# Patient Record
Sex: FEMALE | Race: WHITE | Employment: OTHER | URBAN - METROPOLITAN AREA
[De-identification: names, ages, dates, MRNs, and addresses within clinical notes are randomized per-mention and may not be internally consistent; named-entity substitution may affect disease eponyms.]

---

## 2018-06-27 ENCOUNTER — OFFICE VISIT (OUTPATIENT)
Dept: ORTHOPEDIC SURGERY | Age: 63
End: 2018-06-27
Payer: COMMERCIAL

## 2018-06-27 VITALS
SYSTOLIC BLOOD PRESSURE: 112 MMHG | HEART RATE: 74 BPM | DIASTOLIC BLOOD PRESSURE: 71 MMHG | BODY MASS INDEX: 27.31 KG/M2 | TEMPERATURE: 97.3 F | WEIGHT: 160 LBS | HEIGHT: 64 IN

## 2018-06-27 DIAGNOSIS — M25.552 HIP PAIN, BILATERAL: Primary | ICD-10-CM

## 2018-06-27 DIAGNOSIS — M25.551 HIP PAIN, BILATERAL: Primary | ICD-10-CM

## 2018-06-27 PROCEDURE — 99203 OFFICE O/P NEW LOW 30 MIN: CPT | Performed by: ORTHOPAEDIC SURGERY

## 2018-06-27 RX ORDER — VENLAFAXINE HYDROCHLORIDE 37.5 MG/1
37.5 CAPSULE, EXTENDED RELEASE ORAL DAILY
COMMUNITY
Start: 2018-06-05 | End: 2020-11-18

## 2020-09-30 VITALS — BODY MASS INDEX: 27.46 KG/M2 | HEIGHT: 64 IN | DIASTOLIC BLOOD PRESSURE: 72 MMHG | SYSTOLIC BLOOD PRESSURE: 124 MMHG

## 2020-11-18 ENCOUNTER — OFFICE VISIT (OUTPATIENT)
Dept: ORTHOPEDIC SURGERY | Age: 65
End: 2020-11-18
Payer: COMMERCIAL

## 2020-11-18 VITALS — HEIGHT: 64 IN | TEMPERATURE: 98.2 F | BODY MASS INDEX: 28.17 KG/M2 | WEIGHT: 165 LBS

## 2020-11-18 PROCEDURE — 99214 OFFICE O/P EST MOD 30 MIN: CPT | Performed by: ORTHOPAEDIC SURGERY

## 2020-11-18 RX ORDER — VENLAFAXINE 37.5 MG/1
37.5 TABLET ORAL DAILY
COMMUNITY

## 2020-11-18 RX ORDER — DICLOFENAC SODIUM 75 MG/1
75 TABLET, DELAYED RELEASE ORAL 2 TIMES DAILY
Qty: 60 TABLET | Refills: 3 | Status: SHIPPED
Start: 2020-11-18 | End: 2021-05-05

## 2020-11-18 NOTE — LETTER
Joceline Raza M.D. / Yaakov Spine. Deirdre Estrada M.D. Orthopaedic Surgeons - Board Certified  [address] [facility]  [address],  Gabrielle Rodriguez, [address] [place] [address]  Phone:  [phone]    Fax:   [phone]    Sandra Lorrie  Type of Procedure: RIGHT TOTAL HIP REPLACEMENT  Place of Surgery: [address] [place] [address]  Date of Surgery: TBA  Time of Surgery: TBA      PREOPERATIVE INSTRUCTIONS FOR TOTAL JOINT REPLACEMENT  1. Read all the information provided for you in this packet and joint replacement folder; retain it for 2 years following surgery. 2.  Follow Dietary Handout: Patient Education Guide: Iron Replacement   Begin eating foods rich in iron one month before your surgery and continue for one month after surgery. 3.  Optional:  Not required by Dr. Deirdre Estrada. You may choose an over the counter iron supplement and start taking it at least one month prior to surgery and continue taking the iron supplement for one month following surgery. Feel free to contact your primary care physician for his / her approval or for a prescription. 4.  A nurse from [facility] will contact you and inform you of a date and time for your Pre-Admission Testing Day. You may contact them at [phone] [facility]) or [phone] [facility] for any special requests. 5. Inform your family doctor as soon as your surgery date has been scheduled. See your doctor before surgery and obtain a letter of medical clearance for our office. Notify any specialists you are seeing and obtain a letter of clearance. You must see your pulmonary specialist prior to surgery if you have a condition called Sleep Apnea or use a CPAP machine at night. Please inform us if you have had problems with anesthesia in the past; especially with intubation. 6.  If you do not see a dentist regularly, see your dentist prior to surgery for a dental checkup and cleaning. Please have all dental procedures completed prior to surgery. Your teeth and gums must be in good condition for surgery. Have cavities filled, broken teeth repaired and root canals and / or teeth extractions completed prior to surgery. If teeth extractions are needed prior to surgery, a note from your dentist is required stating your mouth is healed and free of infection. 7. Inform our office if you are ill, have an infection, or if any doctor has prescribed an antibiotic for you. You must be free of infection for surgery. If you become ill within 2 weeks of your surgery date (cold, congestion, flu), surgery will be postponed. A letter of medical clearance from your family doctor will then be required to re-schedule your surgery on the next available date. 8.  If you have private insurance, call customer service and check your benefits and any deductibles / co-pays. 9.  The goal is to discharge you home to your family. If you feel inpatient rehab will be needed, contact your insurance carrier prior to surgery for a list of inpatient rehab facilities. Visiting one or two facilities before surgery may help with your decision. 10.  After surgery, continue regular checkups with your dentist. Please inform your dentist of your joint replacement surgery upon scheduling appointments. Your dentist is to follow The American Dental Association Guidelines for Premedication / Antibiotics prior to dental procedures. 11.  All requests for pain medication refills from our office must be called in to [phone], Ext [phone]. Please speak clearly when leaving your message with the following information:  Your name, spelling of last name, date of birth, name of medication, your pharmacy name and phone number. Please do not wait until the last minute to call in your prescription requests. There is a 24 - 72 hour turn-around time for all refills. If you call on Friday, your request will not begin processing until the next business day. You will be informed when your prescription has been printed and ready for pickup at the doctor's office.     Any questions, problems or concerns regarding your surgery should be addressed to our nurse by calling the office Monday through Thursday 10:00 am - 3:00 pm and Friday 10:00 am - 2:00 pm.

## 2020-11-19 NOTE — PROGRESS NOTES
progression from x-rays in 2018. Left hip is less involved. There is noted to be slight decrease of the acetabular index suggestive of mild hip dysplasia but adequate bone present for reconstruction. Impression: Severe osteoarthritic changes right hip. ASSESSMENT/PLAN:    Ed Burgos was seen today for hip pain. Diagnoses and all orders for this visit:    Hip pain, bilateral  -     XR HIP BILATERAL W AP PELVIS (2 VIEWS); Future    Other orders  -     diclofenac (VOLTAREN) 75 MG EC tablet; Take 1 tablet by mouth 2 times daily    Findings and images reviewed with the patient. She has significant progressive osteoarthritic findings in the right hip on exam and x-ray. She will likely need to consider total hip arthroplasty at some point. In the meantime I reviewed appropriate exercise parameters to discuss with her . Also trial of Voltaren 75 mg twice daily with meals GI precautions for pain. Educational materials regarding total hip arthroplasty were given to the patient today. Return in about 1 month (around 12/18/2020).        Glo Hazel MD    11/19/2020  8:02 AM

## 2021-02-03 ENCOUNTER — TELEPHONE (OUTPATIENT)
Dept: ORTHOPEDIC SURGERY | Age: 66
End: 2021-02-03

## 2021-02-08 ENCOUNTER — TELEPHONE (OUTPATIENT)
Dept: ORTHOPEDIC SURGERY | Age: 66
End: 2021-02-08

## 2021-02-08 ENCOUNTER — OFFICE VISIT (OUTPATIENT)
Dept: ORTHOPEDIC SURGERY | Age: 66
End: 2021-02-08
Payer: COMMERCIAL

## 2021-02-08 VITALS — HEIGHT: 64 IN | TEMPERATURE: 96.6 F | WEIGHT: 165 LBS | BODY MASS INDEX: 28.17 KG/M2

## 2021-02-08 DIAGNOSIS — M25.551 HIP PAIN, CHRONIC, RIGHT: Primary | ICD-10-CM

## 2021-02-08 DIAGNOSIS — G89.29 HIP PAIN, CHRONIC, RIGHT: Primary | ICD-10-CM

## 2021-02-08 PROCEDURE — 99214 OFFICE O/P EST MOD 30 MIN: CPT | Performed by: ORTHOPAEDIC SURGERY

## 2021-02-08 RX ORDER — ACETAMINOPHEN 500 MG
1000 TABLET ORAL EVERY 6 HOURS PRN
COMMUNITY
End: 2021-02-25

## 2021-02-08 RX ORDER — ZINC OXIDE 13 %
1 CREAM (GRAM) TOPICAL DAILY
COMMUNITY
End: 2021-02-25

## 2021-02-08 NOTE — LETTER
If you have any questions or concerns, please contact our nurse at [phone], Option 2:  Monday through Thursday 9:00 am - 4:00 pm  Friday 10:00 am - 12 noon no

## 2021-02-08 NOTE — LETTER
Gil Apgar, M.D. / Amina Oro. Marrianne Bosworth, M.D. Orthopaedic Surgeons - Board Certified  [address] [facility]  [address], [address] [facility] [address]  [address]  Phone:  [phone]    Fax:   [phone]    Linden Carr  Type of Procedure: RIGHT HIP REPLACEMENT  Place of Surgery: [place] [address]. Date of Surgery: 2021  Time of Surgery: 8:00 am      PREOPERATIVE INSTRUCTIONS FOR TOTAL JOINT REPLACEMENT  1. Read all the information provided for you in this packet and joint replacement folder; retain it for 2 years following surgery. 2.  Follow Dietary Handout: Patient Education Guide: Iron Replacement   Begin eating foods rich in iron one month before your surgery and continue for one month after surgery. 3.  Optional:  Not required by Dr. Marrianne Bosworth. You may choose an over the counter iron supplement and start taking it at least one month prior to surgery and continue taking the iron supplement for one month following surgery. Feel free to contact your primary care physician for his / her approval or for a prescription. 4.  A nurse from [facility] will contact you and inform you of a date and time for your Pre-Admission Testing Day. You may contact them at [phone] [facility]) or [phone] [facility] for any special requests. 5. Inform your family doctor as soon as your surgery date has been scheduled. See your doctor before surgery and obtain a letter of medical clearance for our office. Notify any specialists you are seeing and obtain a letter of clearance. You must see your pulmonary specialist prior to surgery if you have a condition called Sleep Apnea or use a CPAP machine at night. Please inform us if you have had problems with anesthesia in the past; especially with intubation. 6.  If you do not see a dentist regularly, see your dentist prior to surgery for a dental checkup and cleaning. Please have all dental procedures completed prior to surgery. Your teeth and gums must be in good condition for surgery. Have cavities filled, broken teeth repaired and root canals and / or teeth extractions completed prior to surgery. If teeth extractions are needed prior to surgery, a note from your dentist is required stating your mouth is healed and free of infection. 7. Inform our office if you are ill, have an infection, or if any doctor has prescribed an antibiotic for you. You must be free of infection for surgery. If you become ill within 2 weeks of your surgery date (cold, congestion, flu), surgery will be postponed. A letter of medical clearance from your family doctor will then be required to re-schedule your surgery on the next available date. 8.  If you have private insurance, call customer service and check your benefits and any deductibles / co-pays. 9.  The goal is to discharge you home to your family. If you feel inpatient rehab will be needed, contact your insurance carrier prior to surgery for a list of inpatient rehab facilities. Visiting one or two facilities before surgery may help with your decision. 10.  After surgery, continue regular checkups with your dentist. Please inform your dentist of your joint replacement surgery upon scheduling appointments. Your dentist is to follow The American Dental Association Guidelines for Premedication / Antibiotics prior to dental procedures. 11.  All requests for pain medication refills from our office must be called in to [phone], Ext [phone]. Please speak clearly when leaving your message with the following information:  Your name, spelling of last name, date of birth, name of medication, your pharmacy name and phone number. Please do not wait until the last minute to call in your prescription requests. There is a 24 - 72 hour turn-around time for all refills. If you call on Friday, your request will not begin processing until the next business day. You will be informed when your prescription has been printed and ready for pickup at the doctor's office.     Any questions, problems or concerns regarding your surgery should be addressed to our nurse by calling the office Monday through Thursday 10:00 am - 3:00 pm and Friday 10:00 am - 2:00 pm.    Molly Edwards RN, BSN, ONC, scribe for Dr. Rayshawn Hammond MD - Orthopedic Surgeon

## 2021-02-08 NOTE — LETTER
Jyoti Becerra M.D.   [address] Reese Kang M.D. [address]     March 16, 2021  Surgery Date:                                                                  Regarding Prescription & Non-Prescription Medications:    Medication Name to Discontinue:   Days before surgery  Last dose  Diclofenac or Voltaren     7   3/08/2021                                                                                                                                                                                                                                                                                                                                                                                                                                                                                                                                                                                                                                                                                                                                                                                                                                                                                                                                                                                                                                                                                                                                                                                                                                                                                                                                             ** All medications discontinued prior to surgery may be resumed after your surgery is completed, unless otherwise specified by the physician. If you have any questions or concerns, please contact our nurse at [phone], Option 2:  Monday through Thursday 9:00 am - 4:00 pm  Friday 10:00 am - 12 noon

## 2021-02-08 NOTE — LETTER
Marilyn Pulido M.D. / Megan Salazar. Eder Schwarz M.D. Orthopaedic Surgeons - Board Certified  [address] [facility]  [address], [address] [facility] [address]  [facility], [address] [place] [address]  Phone:  [phone]    Fax:   [phone]    Kennedi Joyce  Type of Procedure: RIGHT HIP REPLACEMENT  Place of Surgery: [address] [place] [address]. Date of Surgery: Tuesday, March 9, 2021  Time of Surgery: 8:00 am      PREOPERATIVE INSTRUCTIONS FOR TOTAL JOINT REPLACEMENT  1. Read all the information provided for you in this packet and joint replacement folder; retain it for 2 years following surgery. 2.  Follow Dietary Handout: Patient Education Guide: Iron Replacement   Begin eating foods rich in iron one month before your surgery and continue for one month after surgery. 3.  Optional:  Not required by Dr. Eder Schwarz. You may choose an over the counter iron supplement and start taking it at least one month prior to surgery and continue taking the iron supplement for one month following surgery. Feel free to contact your primary care physician for his / her approval or for a prescription. 4.  A nurse from [facility] will contact and inform you of a date and location for your Covid test and date for your Pre-Admission Testing Day and Mandatory Joint Class. You may contact them at [phone] [facility]) or [phone] [facility]) for any special requests. 5. Inform your family doctor as soon as your surgery date has been scheduled. See your doctor before surgery and obtain a letter of medical clearance for our office. Notify any specialists you are seeing and obtain a letter of clearance. You must see your pulmonary specialist prior to surgery if you have a condition called Sleep Apnea or use a CPAP machine at night. Please inform us if you have had problems with anesthesia in the past; especially with intubation. 6.  If you do not see a dentist regularly, see your dentist prior to surgery for a dental checkup and cleaning. Please have all dental procedures completed prior to surgery. Your teeth and gums must be in good condition for surgery. Have cavities filled, broken teeth repaired and root canals and / or teeth extractions completed prior to surgery. If teeth extractions are needed prior to surgery, a note from your dentist is required stating your mouth is healed and free of infection. 7. Inform our office if you are ill, have an infection, or if any doctor has prescribed an antibiotic for you. You must be free of infection for surgery. If you become ill within 2 weeks of your surgery date (cold, congestion, flu), surgery will be postponed. A letter of medical clearance from your family doctor will then be required to re-schedule your surgery on the next available date. 8.  If you have private insurance, call customer service and check your benefits and any deductibles / co-pays. 9.  The goal is to discharge you home to your family. If you feel inpatient rehab will be needed, contact your insurance carrier prior to surgery for a list of inpatient rehab facilities. Visiting one or two facilities before surgery may help with your decision. 10.  After surgery, continue regular checkups with your dentist. Please inform your dentist of your joint replacement surgery upon scheduling appointments. Your dentist is to follow The American Dental Association Guidelines for Premedication / Antibiotics prior to dental procedures. 11.  All requests for pain medication refills from our office must be called in to [phone], Ext [phone]. Please speak clearly when leaving your message with the following information:  Your name, spelling of last name, date of birth, name of medication, your pharmacy name and phone number. Please do not wait until the last minute to call in your prescription requests. There is a 24 - 72 hour turn-around time for all refills. If you call on Friday, your request will not begin processing until the next business day. You will be informed when your prescription has been printed and ready for pickup at the doctor's office.     Any questions, problems or concerns regarding your surgery should be addressed to our nurse by calling the office Monday through Thursday 10:00 am - 3:00 pm and Friday 10:00 am - 2:00 pm.    Chapin Ewing RN, BSN, ONC, scribe for Dr. Jose C Rosales MD - Orthopedic Surgeon

## 2021-02-08 NOTE — PROGRESS NOTES
RIGHT HIP TOTAL JOINT ARTHROPLASTY, [place], general versus spinal anesthesia, Date: Tuesday, March 9, 2021, Time: 8:00 am, Place: [address], Surgeon: Sunny Nieves. Reviewed medications with patient / holding the following medications for surgery: Voltaren or Diclofenac 7 days pre op. Will inform PCP: Pawel Abad of plans for surgery. Patient has regular checkups with the dentist. Dietary Handout: Patient Education Guide Regarding Iron Replacement given to and reviewed with patient. Patient instructed to begin eating foods rich in Iron and Vitamin C one month pre op and continue for one month post op. Pre op instructions discussed w/ patient and spouse. Total Joint Folder given to the patient on a previous visit. Patient informed: A hospital nurse from Pre Admission Testing will contact her with a date and location for Covid Testing and a date for Pre Admission Testing and Mandatory Joint Class. Patient expresses understanding and is in agreement with the plan. Patient will call office with any questions, concerns or problems.     DME needs: Patient has a [employer]

## 2021-02-08 NOTE — TELEPHONE ENCOUNTER
2/08/2021 Fax message to Dr. José Miguel Dickerson [phone]: Notification of plans for surgery - [facility] 3/16/2021. Request medical clearance for surgery. Holding the following medications for surgery: Diclofenac or Voltaren 7 days pre op. Instructed patient to call Dr. Ata Dyer office for an appointment.

## 2021-02-09 ENCOUNTER — TELEPHONE (OUTPATIENT)
Dept: ORTHOPEDIC SURGERY | Age: 66
End: 2021-02-09

## 2021-02-09 NOTE — TELEPHONE ENCOUNTER
Patient has MMO insurance. [employer] ([id number]) is on OPT/OBS list.  Patient scheduled as OPT for [employer] 3/9/2021 NENA BURGOS.  to work on extra days if needed.

## 2021-02-23 NOTE — PROGRESS NOTES
PATIENT AGREES TO COVID TESTING TO BE DONE 3/4/21 @ [facility], AGREES TO SELF ISOLATE UNTIL SURGERY DATE.

## 2021-02-25 NOTE — PROGRESS NOTES
[facility] [facility] PRE-ADMISSION TESTING GENERAL INSTRUCTIONS- [facility]-phone number:[phone]    GENERAL INSTRUCTIONS  [x] Antibacterial Soap shower Night before and/or AM of Surgery  [x] Yasir wipe instruction sheet and wipes given. [x] Nothing by mouth after midnight, including gum, candy, mints, or water. [x] You may brush your teeth, gargle, but do NOT swallow water. [x]No smoking, chewing tobacco, illegal drugs, or alcohol within 24 hours of your surgery. [x] Jewelry, valuables or body piercing's should not be brought to the hospital. All body and/or tongue piercing's must be removed prior to arriving to hospital.  ALL hair pins must be removed. [x] Do not wear makeup, lotions, powders, deodorant. Nail polish as directed by the nurse. [x] Bring insurance card and photo ID. [x] Transfusion Bracelet: Please bring with you to hospital, day of surgery       PARKING INSTRUCTIONS:   [x] Arrival Time:_0600________  [x] Arrive to the [facility] entrance for surgery 3/9/21. You will be directed to pre op. [x] To reach the [facility] from [address] [place] [address], upon entering the hospital, take elevator B to the 3rd floor. EDUCATION INSTRUCTIONS:      [x] Knee or hip replacement booklet & exercise pamphlets given. [x] Pre-admission Testing educational folder given  [x] Incentive Spirometry,coughing & deep breathing exercises reviewed. [x]Fluoroscopy-Xray used in surgery reviewed with patient. Educational pamphlet placed in chart. [x]Pain: Post-op pain is normal and to be expected. You will be asked to rate your pain from 0-10(a zero is not acceptable-education is needed). Your post-op pain goal is:  [x] Ask your nurse for your pain medication. [x] Joint camp offered. [x] Joint replacement booklets given.   [] Other:___________________________    MEDICATION INSTRUCTIONS:   [x]Bring a complete list of your medications, please write the last time you took the medicine, give this list to the nurse. [x] Take the following medications the morning of surgery with 1-2 ounces of water:  venlafaxine  [x] Stop herbal supplements and vitamins 5 days before your surgery. [x] Follow physician instructions regarding any blood thinners you may be taking. WHAT TO EXPECT:  [x] The day of surgery you will be greeted and checked in by the [employer].  In addition, you will be registered in the [facility] by a Patient Access Representative. Please bring your photo ID and insurance card. A nurse will greet you in accordance to the time you are needed in the pre-op area to prepare you for surgery. Please do not be discouraged if you are not greeted in the order you arrive as there are many variables that are involved in patient preparation. Your patience is greatly appreciated as you wait for your nurse. Please bring in items such as: books, magazines, newspapers, electronics, or any other items  to occupy your time in the waiting area. [x]  Delays may occur with surgery and staff will make a sincere effort to keep you informed of delays. If any delays occur with your procedure, we apologize ahead of time for your inconvenience as we recognize the value of your time.

## 2021-03-01 ENCOUNTER — TELEPHONE (OUTPATIENT)
Dept: ORTHOPEDIC SURGERY | Age: 66
End: 2021-03-01

## 2021-03-01 NOTE — TELEPHONE ENCOUNTER
3/01/2021 10:41 am Received a fax from [facility] [phone]: A note dated 2/22/2021 and signed by Dr. Amita Eason 'Patient IS medically stable for surgery. Includes a copy of Dr. Emerson Hernandez office note dated 2/18/2021. EKG is completely normal. Is Okay for surgery. EKG strip included. Above scanned to media.

## 2021-03-02 ENCOUNTER — TELEPHONE (OUTPATIENT)
Dept: ORTHOPEDIC SURGERY | Age: 66
End: 2021-03-02

## 2021-03-02 ENCOUNTER — HOSPITAL ENCOUNTER (OUTPATIENT)
Dept: GENERAL RADIOLOGY | Age: 66
Discharge: HOME | End: 2021-03-04
Payer: COMMERCIAL

## 2021-03-02 ENCOUNTER — HOSPITAL ENCOUNTER (OUTPATIENT)
Dept: PREADMISSION TESTING | Age: 66
Discharge: HOME | End: 2021-03-02
Payer: COMMERCIAL

## 2021-03-02 VITALS
BODY MASS INDEX: 28.84 KG/M2 | SYSTOLIC BLOOD PRESSURE: 139 MMHG | DIASTOLIC BLOOD PRESSURE: 72 MMHG | OXYGEN SATURATION: 100 % | WEIGHT: 168 LBS | TEMPERATURE: 97.5 F | RESPIRATION RATE: 18 BRPM | HEART RATE: 65 BPM

## 2021-03-02 DIAGNOSIS — Z01.818 PREOP TESTING: ICD-10-CM

## 2021-03-02 DIAGNOSIS — M16.11 PRIMARY OSTEOARTHRITIS OF RIGHT HIP: ICD-10-CM

## 2021-03-02 LAB
ABO/RH: NORMAL
ALBUMIN SERPL-MCNC: 4 G/DL (ref 3.5–5.2)
ALP BLD-CCNC: 89 U/L (ref 35–104)
ALT SERPL-CCNC: 22 U/L (ref 0–32)
ANION GAP SERPL CALCULATED.3IONS-SCNC: 6 MMOL/L (ref 7–16)
ANTIBODY SCREEN: NORMAL
AST SERPL-CCNC: 24 U/L (ref 0–31)
BACTERIA: ABNORMAL /HPF
BASOPHILS ABSOLUTE: 0.02 E9/L (ref 0–0.2)
BASOPHILS RELATIVE PERCENT: 0.6 % (ref 0–2)
BILIRUB SERPL-MCNC: 0.3 MG/DL (ref 0–1.2)
BILIRUBIN URINE: NEGATIVE
BLOOD, URINE: NEGATIVE
BUN BLDV-MCNC: 17 MG/DL (ref 8–23)
CALCIUM SERPL-MCNC: 9.4 MG/DL (ref 8.6–10.2)
CHLORIDE BLD-SCNC: 102 MMOL/L (ref 98–107)
CLARITY: CLEAR
CO2: 28 MMOL/L (ref 22–29)
COLOR: YELLOW
CREAT SERPL-MCNC: 0.8 MG/DL (ref 0.5–1)
EOSINOPHILS ABSOLUTE: 0.01 E9/L (ref 0.05–0.5)
EOSINOPHILS RELATIVE PERCENT: 0.3 % (ref 0–6)
GFR AFRICAN AMERICAN: >60
GFR NON-AFRICAN AMERICAN: >60 ML/MIN/1.73
GLUCOSE BLD-MCNC: 89 MG/DL (ref 74–99)
GLUCOSE URINE: NEGATIVE MG/DL
HCT VFR BLD CALC: 36 % (ref 34–48)
HEMOGLOBIN: 11.3 G/DL (ref 11.5–15.5)
IMMATURE GRANULOCYTES #: 0.01 E9/L
IMMATURE GRANULOCYTES %: 0.3 % (ref 0–5)
KETONES, URINE: NEGATIVE MG/DL
LEUKOCYTE ESTERASE, URINE: ABNORMAL
LYMPHOCYTES ABSOLUTE: 0.77 E9/L (ref 1.5–4)
LYMPHOCYTES RELATIVE PERCENT: 21.6 % (ref 20–42)
MCH RBC QN AUTO: 28.8 PG (ref 26–35)
MCHC RBC AUTO-ENTMCNC: 31.4 % (ref 32–34.5)
MCV RBC AUTO: 91.8 FL (ref 80–99.9)
MONOCYTES ABSOLUTE: 0.37 E9/L (ref 0.1–0.95)
MONOCYTES RELATIVE PERCENT: 10.4 % (ref 2–12)
NEUTROPHILS ABSOLUTE: 2.39 E9/L (ref 1.8–7.3)
NEUTROPHILS RELATIVE PERCENT: 66.8 % (ref 43–80)
NITRITE, URINE: POSITIVE
PDW BLD-RTO: 13.1 FL (ref 11.5–15)
PH UA: 6 (ref 5–9)
PLATELET # BLD: 196 E9/L (ref 130–450)
PMV BLD AUTO: 11.6 FL (ref 7–12)
POTASSIUM REFLEX MAGNESIUM: 4.2 MMOL/L (ref 3.5–5)
PROTEIN UA: NEGATIVE MG/DL
RBC # BLD: 3.92 E12/L (ref 3.5–5.5)
RBC UA: ABNORMAL /HPF (ref 0–2)
SODIUM BLD-SCNC: 136 MMOL/L (ref 132–146)
SPECIFIC GRAVITY UA: 1.01 (ref 1–1.03)
TOTAL PROTEIN: 9.4 G/DL (ref 6.4–8.3)
UROBILINOGEN, URINE: 0.2 E.U./DL
WBC # BLD: 3.6 E9/L (ref 4.5–11.5)
WBC UA: ABNORMAL /HPF (ref 0–5)

## 2021-03-02 PROCEDURE — 86850 RBC ANTIBODY SCREEN: CPT

## 2021-03-02 PROCEDURE — 36415 COLL VENOUS BLD VENIPUNCTURE: CPT

## 2021-03-02 PROCEDURE — 87088 URINE BACTERIA CULTURE: CPT

## 2021-03-02 PROCEDURE — 71046 X-RAY EXAM CHEST 2 VIEWS: CPT

## 2021-03-02 PROCEDURE — 85025 COMPLETE CBC W/AUTO DIFF WBC: CPT

## 2021-03-02 PROCEDURE — 87186 SC STD MICRODIL/AGAR DIL: CPT

## 2021-03-02 PROCEDURE — 81001 URINALYSIS AUTO W/SCOPE: CPT

## 2021-03-02 PROCEDURE — 86900 BLOOD TYPING SEROLOGIC ABO: CPT

## 2021-03-02 PROCEDURE — 80053 COMPREHEN METABOLIC PANEL: CPT

## 2021-03-02 PROCEDURE — 87081 CULTURE SCREEN ONLY: CPT

## 2021-03-02 PROCEDURE — 86901 BLOOD TYPING SEROLOGIC RH(D): CPT

## 2021-03-02 NOTE — PROGRESS NOTES
Left a voicemail with Dr Goldie Matthews nurse Garrett regarding abnormal UA and CBC results in PAT. Micro notified to run urine culture.

## 2021-03-02 NOTE — TELEPHONE ENCOUNTER
3/02/2021 1:00 pm Aleshia Ibarra RN PAT [facility] phoned office to report: Many bacteria in U/A, WBC's 2-5, leucocytes moderate.     3/02/2021 2:53 pm Lab results from [facility] faxed to Dr. Edmar Dunn [phone]:  Unsuccessful - no answer    3/02/2021 3:30 pm Labs successfully faxed to [phone]

## 2021-03-02 NOTE — TELEPHONE ENCOUNTER
3/02/2021 8:30 am Patient phoned office / message left on voice mail: For surgery next Tuesday [facility]), Have an appointment for my first Covid vaccine on Thursday. Patient asks: OK to recieve before surgery or wait until after surgery?    Phone: [phone]

## 2021-03-03 LAB — MRSA CULTURE ONLY: NORMAL

## 2021-03-03 RX ORDER — SULFAMETHOXAZOLE AND TRIMETHOPRIM 800; 160 MG/1; MG/1
1 TABLET ORAL 2 TIMES DAILY
Qty: 20 TABLET | Refills: 0 | Status: SHIPPED | OUTPATIENT
Start: 2021-03-03 | End: 2021-03-13

## 2021-03-04 ENCOUNTER — HOSPITAL ENCOUNTER (OUTPATIENT)
Age: 66
Discharge: HOME | End: 2021-03-06
Payer: COMMERCIAL

## 2021-03-04 LAB
ORGANISM: ABNORMAL
URINE CULTURE, ROUTINE: ABNORMAL

## 2021-03-04 PROCEDURE — U0003 INFECTIOUS AGENT DETECTION BY NUCLEIC ACID (DNA OR RNA); SEVERE ACUTE RESPIRATORY SYNDROME CORONAVIRUS 2 (SARS-COV-2) (CORONAVIRUS DISEASE [COVID-19]), AMPLIFIED PROBE TECHNIQUE, MAKING USE OF HIGH THROUGHPUT TECHNOLOGIES AS DESCRIBED BY CMS-2020-01-R: HCPCS

## 2021-03-04 NOTE — TELEPHONE ENCOUNTER
3/04/2021 9:00 am Patient phoned office / message left on voice mail: Have an appointment 5:30 pm for Covid vaccine [employer]

## 2021-03-04 NOTE — TELEPHONE ENCOUNTER
3/03/2021 11:14 am Phoned and spoke w/ Aracelsi at Dr. An Ritchie office [phone]. Aracelis reports: A doctor covering for Dr. Acosta Sorenson ordered Macrodantin x's 10 days for the patient. 3/03/2021 Informed RJB of above / Per RJB: Disregard prescription for Bactrim, approved prescription for Macrodantin    3/03/2021 Phoned and spoke w/ patient / informed of above.

## 2021-03-05 DIAGNOSIS — U07.1 COVID-19: ICD-10-CM

## 2021-03-05 LAB — SARS-COV-2, PCR: NOT DETECTED

## 2021-03-05 NOTE — H&P
Lata Gandara MD   Physician   Specialty:  Orthopedic Surgery                               Chief Complaint   Patient presents with    Hip Pain       FU Rt hip pain. Discuss surgery and timing.            HPI   68-year-old woman with known severe osteoarthritis right hip. Seen several months ago with right hip and groin pain refractory to conservative measures including medications and physical therapy. She has been deferring consideration of hip replacement due to her 's recent kidney transplant but he is now doing well. She is ready to plan right total hip arthroplasty.         Patient Active Problem List   Diagnosis    Acute febrile illness    [facility]ogren's disease ([address]    Anxiety and depression         Past Medical History        Past Medical History:   Diagnosis Date    Anxiety      Depression      [address]ogren's [address] ([facility] [address]              Past Surgical History   Past Surgical History:   Procedure Laterality Date    CATARACT REMOVAL Left      DILATION AND CURETTAGE OF UTERUS   1984    SHOULDER ARTHROSCOPY Right 04/29/2015     Excision loose bodies, Acromioplasty, Roxana resection distal clavicle  KENDRA Avery MD            Current Facility-Administered Medications          Current Outpatient Medications   Medication Sig Dispense Refill    Cholecalciferol (VITAMIN D3) 25 MCG (1000 UT) CAPS Take 1 capsule by mouth 2 times daily        acetaminophen (TYLENOL) 500 MG tablet Take 1,000 mg by mouth every 6 hours as needed for Pain        Probiotic Product (PROBIOTIC DAILY) CAPS Take 1 capsule by mouth daily        venlafaxine (EFFEXOR) 37.5 MG tablet Take 37.5 mg by mouth daily        diclofenac (VOLTAREN) 75 MG EC tablet Take 1 tablet by mouth 2 times daily 60 tablet 3    NALTREXONE HCL PO Take 3 mg by mouth daily          No current facility-administered medications for this visit.                   Allergies   Allergen Reactions    Bee Venom Swelling       Does not have epi pen    Pcn [Penicillins]           Social History   Social History            Socioeconomic History    Marital status:        Spouse name: None    Number of children: None    Years of education: None    Highest education level: None   Occupational History    None   Social Needs    Financial resource strain: None    Food insecurity       Worry: None       Inability: None    Transportation needs       Medical: None       Non-medical: None   Tobacco Use    Smoking status: Never Smoker    Smokeless tobacco: Never Used   Substance and Sexual Activity    Alcohol use: No    Drug use: No    Sexual activity: Yes       Partners: Male   Lifestyle    Physical activity       Days per week: None       Minutes per session: None    Stress: None   Relationships    Social connections       Talks on phone: None       Gets together: None       Attends Alevism service: None       Active member of club or organization: None       Attends meetings of clubs or organizations: None       Relationship status: None    Intimate partner violence       Fear of current or ex partner: None       Emotionally abused: None       Physically abused: None       Forced sexual activity: None   Other Topics Concern    None   Social History Narrative    None            Family History         Family History   Problem Relation Age of Onset    Liver Disease Mother      Cancer Father           lung liver cancer major smoker    Cancer Brother           pancreatic cancer             Review of Systems   No fever, chills, or other constitutionalsymptoms. No numbness or other neuro symptoms. No chest pain. No dyspnea.     [unfilled]   Slight antalgic gait favoring right hip. Lower extremity exam demonstrates 5 mm shortening right hip versus the left. There is full rotation of left hip without pain.   Right hip and groin pain immediately produced on internal rotation right hip past neutral limited to about 30 degrees internal rotation. Distal neurologic and vascular grossly intact.     Physical Exam    Patient is alert and oriented. Well-developed well-nourished. BMI 28. Pupils equal and reactive. Scleraeanicteric. Neck supple  Lungs clear. Cardiac rate and rhythm regular. Abdomen soft and nontender. Skin warm and dry.         XRAY: Recent hip x-rays reviewed AP pelvis with AP and lateral views right hip. There are bilateral degenerative changes left hip is moderate right more advanced. In particular the lateral view of the right hip shows stage IV medial compartment joint space loss. Impression: Severe osteoarthritic changes right hip.                     ASSESSMENT/PLAN:     Osteoarthritis right hip     We had a lengthy discussion with the patient and her  today regarding total hip arthroplasty recovery time risks and limitations. Timing issues also reviewed. At this point she wants to schedule right total hip arthroplasty for March 16. Extensive preoperative teaching given today.   The procedure, indications, risks, limitations and recovery time were all reviewed with patient, who requests to proceed.           Kelvin Valdez MD

## 2021-03-09 ENCOUNTER — APPOINTMENT (OUTPATIENT)
Dept: GENERAL RADIOLOGY | Age: 66
End: 2021-03-09
Attending: ORTHOPAEDIC SURGERY
Payer: COMMERCIAL

## 2021-03-09 ENCOUNTER — ANESTHESIA EVENT (OUTPATIENT)
Dept: OPERATING ROOM | Age: 66
End: 2021-03-09
Payer: COMMERCIAL

## 2021-03-09 ENCOUNTER — ANESTHESIA (OUTPATIENT)
Dept: OPERATING ROOM | Age: 66
End: 2021-03-09
Payer: COMMERCIAL

## 2021-03-09 ENCOUNTER — HOSPITAL ENCOUNTER (OUTPATIENT)
Age: 66
Setting detail: OBSERVATION
Discharge: HOME HEALTH | End: 2021-03-10
Attending: ORTHOPAEDIC SURGERY | Admitting: ORTHOPAEDIC SURGERY
Payer: COMMERCIAL

## 2021-03-09 VITALS — TEMPERATURE: 95.9 F | DIASTOLIC BLOOD PRESSURE: 109 MMHG | OXYGEN SATURATION: 96 % | SYSTOLIC BLOOD PRESSURE: 118 MMHG

## 2021-03-09 DIAGNOSIS — Z96.641 STATUS POST TOTAL HIP REPLACEMENT, RIGHT: ICD-10-CM

## 2021-03-09 DIAGNOSIS — Z01.818 PREOP TESTING: Primary | ICD-10-CM

## 2021-03-09 DIAGNOSIS — U07.1 COVID-19: ICD-10-CM

## 2021-03-09 DIAGNOSIS — M16.11 PRIMARY OSTEOARTHRITIS OF RIGHT HIP: ICD-10-CM

## 2021-03-09 PROCEDURE — 97165 OT EVAL LOW COMPLEX 30 MIN: CPT

## 2021-03-09 PROCEDURE — 7100000000 HC PACU RECOVERY - FIRST 15 MIN: Performed by: ORTHOPAEDIC SURGERY

## 2021-03-09 PROCEDURE — C1713 ANCHOR/SCREW BN/BN,TIS/BN: HCPCS | Performed by: ORTHOPAEDIC SURGERY

## 2021-03-09 PROCEDURE — 6360000002 HC RX W HCPCS: Performed by: ORTHOPAEDIC SURGERY

## 2021-03-09 PROCEDURE — 97535 SELF CARE MNGMENT TRAINING: CPT

## 2021-03-09 PROCEDURE — 97530 THERAPEUTIC ACTIVITIES: CPT

## 2021-03-09 PROCEDURE — G0378 HOSPITAL OBSERVATION PER HR: HCPCS

## 2021-03-09 PROCEDURE — 73502 X-RAY EXAM HIP UNI 2-3 VIEWS: CPT

## 2021-03-09 PROCEDURE — 3600000005 HC SURGERY LEVEL 5 BASE: Performed by: ORTHOPAEDIC SURGERY

## 2021-03-09 PROCEDURE — 88311 DECALCIFY TISSUE: CPT

## 2021-03-09 PROCEDURE — C1776 JOINT DEVICE (IMPLANTABLE): HCPCS | Performed by: ORTHOPAEDIC SURGERY

## 2021-03-09 PROCEDURE — 6370000000 HC RX 637 (ALT 250 FOR IP): Performed by: ORTHOPAEDIC SURGERY

## 2021-03-09 PROCEDURE — 7100000001 HC PACU RECOVERY - ADDTL 15 MIN: Performed by: ORTHOPAEDIC SURGERY

## 2021-03-09 PROCEDURE — 2500000003 HC RX 250 WO HCPCS: Performed by: NURSE ANESTHETIST, CERTIFIED REGISTERED

## 2021-03-09 PROCEDURE — 2709999900 HC NON-CHARGEABLE SUPPLY: Performed by: ORTHOPAEDIC SURGERY

## 2021-03-09 PROCEDURE — 3600000015 HC SURGERY LEVEL 5 ADDTL 15MIN: Performed by: ORTHOPAEDIC SURGERY

## 2021-03-09 PROCEDURE — 6360000002 HC RX W HCPCS: Performed by: NURSE ANESTHETIST, CERTIFIED REGISTERED

## 2021-03-09 PROCEDURE — 1200000000 HC SEMI PRIVATE

## 2021-03-09 PROCEDURE — 2580000003 HC RX 258: Performed by: NURSE ANESTHETIST, CERTIFIED REGISTERED

## 2021-03-09 PROCEDURE — 88304 TISSUE EXAM BY PATHOLOGIST: CPT

## 2021-03-09 PROCEDURE — 2500000003 HC RX 250 WO HCPCS: Performed by: ORTHOPAEDIC SURGERY

## 2021-03-09 PROCEDURE — 3700000001 HC ADD 15 MINUTES (ANESTHESIA): Performed by: ORTHOPAEDIC SURGERY

## 2021-03-09 PROCEDURE — 2580000003 HC RX 258: Performed by: ORTHOPAEDIC SURGERY

## 2021-03-09 PROCEDURE — 27130 TOTAL HIP ARTHROPLASTY: CPT | Performed by: ORTHOPAEDIC SURGERY

## 2021-03-09 PROCEDURE — 6360000002 HC RX W HCPCS: Performed by: ANESTHESIOLOGY

## 2021-03-09 PROCEDURE — 3700000000 HC ANESTHESIA ATTENDED CARE: Performed by: ORTHOPAEDIC SURGERY

## 2021-03-09 PROCEDURE — 72170 X-RAY EXAM OF PELVIS: CPT

## 2021-03-09 PROCEDURE — 97162 PT EVAL MOD COMPLEX 30 MIN: CPT

## 2021-03-09 PROCEDURE — 2720000010 HC SURG SUPPLY STERILE: Performed by: ORTHOPAEDIC SURGERY

## 2021-03-09 DEVICE — STEM FEM SZ 3 L102MM NK L30MM 38MM OFFSET 127DEG HIP TI: Type: IMPLANTABLE DEVICE | Site: HIP | Status: FUNCTIONAL

## 2021-03-09 DEVICE — LINER ACET SZ D OD50X52MM ID32MM THK5.9MM 0DEG HIP X3: Type: IMPLANTABLE DEVICE | Site: HIP | Status: FUNCTIONAL

## 2021-03-09 DEVICE — SHELL ACET SZ D DIA48MM 3 CLUS H TRITANIUM PRESSFIT PRI: Type: IMPLANTABLE DEVICE | Site: HIP | Status: FUNCTIONAL

## 2021-03-09 DEVICE — HEAD FEM DIA32MM +0MM OFFSET HIP BIOLOX DELT CERAMIC TAPR: Type: IMPLANTABLE DEVICE | Site: HIP | Status: FUNCTIONAL

## 2021-03-09 DEVICE — SCREW BNE L40MM DIA6.5MM LO PROF HEX TRIDENT LL: Type: IMPLANTABLE DEVICE | Site: HIP | Status: FUNCTIONAL

## 2021-03-09 DEVICE — SCREW BNE L20MM DIA65MM LO PROF HEX TRIDENT LL: Type: IMPLANTABLE DEVICE | Site: HIP | Status: FUNCTIONAL

## 2021-03-09 RX ORDER — LIDOCAINE HYDROCHLORIDE 20 MG/ML
INJECTION, SOLUTION INTRAVENOUS PRN
Status: DISCONTINUED | OUTPATIENT
Start: 2021-03-09 | End: 2021-03-09 | Stop reason: SDUPTHER

## 2021-03-09 RX ORDER — CELECOXIB 100 MG/1
200 CAPSULE ORAL ONCE
Status: COMPLETED | OUTPATIENT
Start: 2021-03-09 | End: 2021-03-09

## 2021-03-09 RX ORDER — SODIUM CHLORIDE 0.9 % (FLUSH) 0.9 %
10 SYRINGE (ML) INJECTION EVERY 12 HOURS SCHEDULED
Status: DISCONTINUED | OUTPATIENT
Start: 2021-03-09 | End: 2021-03-09 | Stop reason: HOSPADM

## 2021-03-09 RX ORDER — HYDROCODONE BITARTRATE AND ACETAMINOPHEN 5; 325 MG/1; MG/1
1 TABLET ORAL PRN
Status: DISCONTINUED | OUTPATIENT
Start: 2021-03-09 | End: 2021-03-09 | Stop reason: HOSPADM

## 2021-03-09 RX ORDER — MORPHINE SULFATE 4 MG/ML
4 INJECTION, SOLUTION INTRAMUSCULAR; INTRAVENOUS
Status: DISCONTINUED | OUTPATIENT
Start: 2021-03-09 | End: 2021-03-10 | Stop reason: HOSPADM

## 2021-03-09 RX ORDER — SODIUM CHLORIDE 9 MG/ML
INJECTION, SOLUTION INTRAVENOUS CONTINUOUS PRN
Status: DISCONTINUED | OUTPATIENT
Start: 2021-03-09 | End: 2021-03-09 | Stop reason: SDUPTHER

## 2021-03-09 RX ORDER — MORPHINE SULFATE 2 MG/ML
2 INJECTION, SOLUTION INTRAMUSCULAR; INTRAVENOUS EVERY 5 MIN PRN
Status: DISCONTINUED | OUTPATIENT
Start: 2021-03-09 | End: 2021-03-09 | Stop reason: HOSPADM

## 2021-03-09 RX ORDER — SODIUM CHLORIDE 9 MG/ML
INJECTION, SOLUTION INTRAVENOUS CONTINUOUS
Status: ACTIVE | OUTPATIENT
Start: 2021-03-09 | End: 2021-03-10

## 2021-03-09 RX ORDER — SODIUM CHLORIDE 0.9 % (FLUSH) 0.9 %
10 SYRINGE (ML) INJECTION PRN
Status: DISCONTINUED | OUTPATIENT
Start: 2021-03-09 | End: 2021-03-09 | Stop reason: HOSPADM

## 2021-03-09 RX ORDER — HYDROCODONE BITARTRATE AND ACETAMINOPHEN 5; 325 MG/1; MG/1
2 TABLET ORAL PRN
Status: DISCONTINUED | OUTPATIENT
Start: 2021-03-09 | End: 2021-03-09 | Stop reason: HOSPADM

## 2021-03-09 RX ORDER — GABAPENTIN 300 MG/1
600 CAPSULE ORAL ONCE
Status: COMPLETED | OUTPATIENT
Start: 2021-03-09 | End: 2021-03-09

## 2021-03-09 RX ORDER — OXYCODONE HYDROCHLORIDE 10 MG/1
10 TABLET ORAL EVERY 4 HOURS PRN
Status: DISCONTINUED | OUTPATIENT
Start: 2021-03-09 | End: 2021-03-10 | Stop reason: HOSPADM

## 2021-03-09 RX ORDER — MORPHINE SULFATE 2 MG/ML
2 INJECTION, SOLUTION INTRAMUSCULAR; INTRAVENOUS
Status: DISCONTINUED | OUTPATIENT
Start: 2021-03-09 | End: 2021-03-10 | Stop reason: HOSPADM

## 2021-03-09 RX ORDER — VANCOMYCIN HYDROCHLORIDE 1 G/20ML
INJECTION, POWDER, LYOPHILIZED, FOR SOLUTION INTRAVENOUS PRN
Status: DISCONTINUED | OUTPATIENT
Start: 2021-03-09 | End: 2021-03-09 | Stop reason: ALTCHOICE

## 2021-03-09 RX ORDER — ASPIRIN 325 MG
325 TABLET, DELAYED RELEASE (ENTERIC COATED) ORAL DAILY
Qty: 30 TABLET | Refills: 3 | Status: SHIPPED | OUTPATIENT
Start: 2021-03-09 | End: 2021-05-05

## 2021-03-09 RX ORDER — SENNA AND DOCUSATE SODIUM 50; 8.6 MG/1; MG/1
1 TABLET, FILM COATED ORAL 2 TIMES DAILY
Status: DISCONTINUED | OUTPATIENT
Start: 2021-03-09 | End: 2021-03-10 | Stop reason: HOSPADM

## 2021-03-09 RX ORDER — MORPHINE SULFATE 2 MG/ML
1 INJECTION, SOLUTION INTRAMUSCULAR; INTRAVENOUS EVERY 5 MIN PRN
Status: DISCONTINUED | OUTPATIENT
Start: 2021-03-09 | End: 2021-03-09 | Stop reason: HOSPADM

## 2021-03-09 RX ORDER — FENTANYL CITRATE 50 UG/ML
INJECTION, SOLUTION INTRAMUSCULAR; INTRAVENOUS PRN
Status: DISCONTINUED | OUTPATIENT
Start: 2021-03-09 | End: 2021-03-09 | Stop reason: SDUPTHER

## 2021-03-09 RX ORDER — PROPOFOL 10 MG/ML
INJECTION, EMULSION INTRAVENOUS PRN
Status: DISCONTINUED | OUTPATIENT
Start: 2021-03-09 | End: 2021-03-09 | Stop reason: SDUPTHER

## 2021-03-09 RX ORDER — ONDANSETRON 2 MG/ML
4 INJECTION INTRAMUSCULAR; INTRAVENOUS EVERY 6 HOURS PRN
Status: DISCONTINUED | OUTPATIENT
Start: 2021-03-09 | End: 2021-03-10 | Stop reason: HOSPADM

## 2021-03-09 RX ORDER — ACETAMINOPHEN 325 MG/1
650 TABLET ORAL EVERY 6 HOURS
Status: DISCONTINUED | OUTPATIENT
Start: 2021-03-09 | End: 2021-03-10 | Stop reason: HOSPADM

## 2021-03-09 RX ORDER — VENLAFAXINE 75 MG/1
37.5 TABLET ORAL DAILY
Status: DISCONTINUED | OUTPATIENT
Start: 2021-03-10 | End: 2021-03-10 | Stop reason: HOSPADM

## 2021-03-09 RX ORDER — ACETAMINOPHEN 500 MG
1000 TABLET ORAL ONCE
Status: COMPLETED | OUTPATIENT
Start: 2021-03-09 | End: 2021-03-09

## 2021-03-09 RX ORDER — MEPERIDINE HYDROCHLORIDE 25 MG/ML
12.5 INJECTION INTRAMUSCULAR; INTRAVENOUS; SUBCUTANEOUS EVERY 5 MIN PRN
Status: DISCONTINUED | OUTPATIENT
Start: 2021-03-09 | End: 2021-03-09 | Stop reason: HOSPADM

## 2021-03-09 RX ORDER — ONDANSETRON 4 MG/1
4 TABLET, ORALLY DISINTEGRATING ORAL EVERY 8 HOURS PRN
Status: DISCONTINUED | OUTPATIENT
Start: 2021-03-09 | End: 2021-03-10 | Stop reason: HOSPADM

## 2021-03-09 RX ORDER — DEXAMETHASONE SODIUM PHOSPHATE 10 MG/ML
INJECTION INTRAMUSCULAR; INTRAVENOUS PRN
Status: DISCONTINUED | OUTPATIENT
Start: 2021-03-09 | End: 2021-03-09 | Stop reason: SDUPTHER

## 2021-03-09 RX ORDER — OXYCODONE HYDROCHLORIDE AND ACETAMINOPHEN 5; 325 MG/1; MG/1
1 TABLET ORAL EVERY 6 HOURS PRN
Qty: 28 TABLET | Refills: 0 | Status: SHIPPED | OUTPATIENT
Start: 2021-03-09 | End: 2021-03-09 | Stop reason: SDUPTHER

## 2021-03-09 RX ORDER — ROCURONIUM BROMIDE 10 MG/ML
INJECTION, SOLUTION INTRAVENOUS PRN
Status: DISCONTINUED | OUTPATIENT
Start: 2021-03-09 | End: 2021-03-09 | Stop reason: SDUPTHER

## 2021-03-09 RX ORDER — MIDAZOLAM HYDROCHLORIDE 1 MG/ML
INJECTION INTRAMUSCULAR; INTRAVENOUS PRN
Status: DISCONTINUED | OUTPATIENT
Start: 2021-03-09 | End: 2021-03-09 | Stop reason: SDUPTHER

## 2021-03-09 RX ORDER — OXYCODONE HYDROCHLORIDE AND ACETAMINOPHEN 5; 325 MG/1; MG/1
1 TABLET ORAL EVERY 6 HOURS PRN
Qty: 28 TABLET | Refills: 0 | Status: SHIPPED | OUTPATIENT
Start: 2021-03-09 | End: 2021-03-16

## 2021-03-09 RX ORDER — OXYCODONE HYDROCHLORIDE 5 MG/1
5 TABLET ORAL EVERY 4 HOURS PRN
Status: DISCONTINUED | OUTPATIENT
Start: 2021-03-09 | End: 2021-03-10 | Stop reason: HOSPADM

## 2021-03-09 RX ORDER — SODIUM CHLORIDE 0.9 % (FLUSH) 0.9 %
10 SYRINGE (ML) INJECTION PRN
Status: DISCONTINUED | OUTPATIENT
Start: 2021-03-09 | End: 2021-03-10 | Stop reason: HOSPADM

## 2021-03-09 RX ORDER — PROMETHAZINE HYDROCHLORIDE 25 MG/ML
6.25 INJECTION, SOLUTION INTRAMUSCULAR; INTRAVENOUS EVERY 10 MIN PRN
Status: DISCONTINUED | OUTPATIENT
Start: 2021-03-09 | End: 2021-03-09 | Stop reason: HOSPADM

## 2021-03-09 RX ORDER — CALCIUM CHLORIDE 100 MG/ML
INJECTION INTRAVENOUS; INTRAVENTRICULAR PRN
Status: DISCONTINUED | OUTPATIENT
Start: 2021-03-09 | End: 2021-03-09 | Stop reason: ALTCHOICE

## 2021-03-09 RX ORDER — SODIUM CHLORIDE 0.9 % (FLUSH) 0.9 %
10 SYRINGE (ML) INJECTION EVERY 12 HOURS SCHEDULED
Status: DISCONTINUED | OUTPATIENT
Start: 2021-03-09 | End: 2021-03-10 | Stop reason: HOSPADM

## 2021-03-09 RX ORDER — DEXAMETHASONE SODIUM PHOSPHATE 4 MG/ML
8 INJECTION, SOLUTION INTRA-ARTICULAR; INTRALESIONAL; INTRAMUSCULAR; INTRAVENOUS; SOFT TISSUE ONCE
Status: COMPLETED | OUTPATIENT
Start: 2021-03-09 | End: 2021-03-09

## 2021-03-09 RX ORDER — SODIUM CHLORIDE 9 MG/ML
INJECTION, SOLUTION INTRAVENOUS CONTINUOUS
Status: DISCONTINUED | OUTPATIENT
Start: 2021-03-09 | End: 2021-03-09

## 2021-03-09 RX ADMIN — SODIUM CHLORIDE: 9 INJECTION, SOLUTION INTRAVENOUS at 07:06

## 2021-03-09 RX ADMIN — DOCUSATE SODIUM 50 MG AND SENNOSIDES 8.6 MG 1 TABLET: 8.6; 5 TABLET, FILM COATED ORAL at 20:52

## 2021-03-09 RX ADMIN — PROMETHAZINE HYDROCHLORIDE 6.25 MG: 25 INJECTION INTRAMUSCULAR; INTRAVENOUS at 10:19

## 2021-03-09 RX ADMIN — ACETAMINOPHEN 1000 MG: 500 TABLET ORAL at 07:26

## 2021-03-09 RX ADMIN — Medication 2000 MG: at 23:57

## 2021-03-09 RX ADMIN — LIDOCAINE HYDROCHLORIDE 100 MG: 20 INJECTION, SOLUTION INTRAVENOUS at 08:04

## 2021-03-09 RX ADMIN — FENTANYL CITRATE 50 MCG: 50 INJECTION, SOLUTION INTRAMUSCULAR; INTRAVENOUS at 09:32

## 2021-03-09 RX ADMIN — ACETAMINOPHEN 650 MG: 325 TABLET ORAL at 12:38

## 2021-03-09 RX ADMIN — ASPIRIN 325 MG: 325 TABLET, COATED ORAL at 13:40

## 2021-03-09 RX ADMIN — TRANEXAMIC ACID 1 G: 1 INJECTION, SOLUTION INTRAVENOUS at 08:13

## 2021-03-09 RX ADMIN — FENTANYL CITRATE 75 MCG: 50 INJECTION, SOLUTION INTRAMUSCULAR; INTRAVENOUS at 08:04

## 2021-03-09 RX ADMIN — FENTANYL CITRATE 25 MCG: 50 INJECTION, SOLUTION INTRAMUSCULAR; INTRAVENOUS at 09:23

## 2021-03-09 RX ADMIN — SODIUM CHLORIDE: 9 INJECTION, SOLUTION INTRAVENOUS at 07:30

## 2021-03-09 RX ADMIN — Medication 2000 MG: at 17:51

## 2021-03-09 RX ADMIN — MORPHINE SULFATE 2 MG: 2 INJECTION, SOLUTION INTRAMUSCULAR; INTRAVENOUS at 10:05

## 2021-03-09 RX ADMIN — PROPOFOL 150 MG: 10 INJECTION, EMULSION INTRAVENOUS at 08:04

## 2021-03-09 RX ADMIN — ASPIRIN 325 MG: 325 TABLET, COATED ORAL at 20:52

## 2021-03-09 RX ADMIN — DOCUSATE SODIUM 50 MG AND SENNOSIDES 8.6 MG 1 TABLET: 8.6; 5 TABLET, FILM COATED ORAL at 12:38

## 2021-03-09 RX ADMIN — DEXAMETHASONE SODIUM PHOSPHATE 8 MG: 4 INJECTION, SOLUTION INTRA-ARTICULAR; INTRALESIONAL; INTRAMUSCULAR; INTRAVENOUS; SOFT TISSUE at 07:27

## 2021-03-09 RX ADMIN — Medication 2 G: at 08:08

## 2021-03-09 RX ADMIN — GABAPENTIN 600 MG: 300 CAPSULE ORAL at 07:27

## 2021-03-09 RX ADMIN — CELECOXIB 200 MG: 100 CAPSULE ORAL at 07:26

## 2021-03-09 RX ADMIN — FENTANYL CITRATE 50 MCG: 50 INJECTION, SOLUTION INTRAMUSCULAR; INTRAVENOUS at 09:26

## 2021-03-09 RX ADMIN — MORPHINE SULFATE 2 MG: 2 INJECTION, SOLUTION INTRAMUSCULAR; INTRAVENOUS at 10:00

## 2021-03-09 RX ADMIN — MIDAZOLAM 1 MG: 1 INJECTION INTRAMUSCULAR; INTRAVENOUS at 07:57

## 2021-03-09 RX ADMIN — SODIUM CHLORIDE: 9 INJECTION, SOLUTION INTRAVENOUS at 08:32

## 2021-03-09 RX ADMIN — MORPHINE SULFATE 2 MG: 2 INJECTION, SOLUTION INTRAMUSCULAR; INTRAVENOUS at 10:20

## 2021-03-09 RX ADMIN — DEXAMETHASONE SODIUM PHOSPHATE 10 MG: 10 INJECTION INTRAMUSCULAR; INTRAVENOUS at 08:09

## 2021-03-09 RX ADMIN — ROCURONIUM BROMIDE 50 MG: 10 INJECTION, SOLUTION INTRAVENOUS at 08:04

## 2021-03-09 RX ADMIN — SUGAMMADEX 152 MG: 100 INJECTION, SOLUTION INTRAVENOUS at 09:28

## 2021-03-09 RX ADMIN — FENTANYL CITRATE 50 MCG: 50 INJECTION, SOLUTION INTRAMUSCULAR; INTRAVENOUS at 09:30

## 2021-03-09 RX ADMIN — ACETAMINOPHEN 650 MG: 325 TABLET ORAL at 23:57

## 2021-03-09 RX ADMIN — ACETAMINOPHEN 650 MG: 325 TABLET ORAL at 17:51

## 2021-03-09 RX ADMIN — PHENYLEPHRINE HYDROCHLORIDE 100 MCG: 10 INJECTION INTRAVENOUS at 08:46

## 2021-03-09 RX ADMIN — PHENYLEPHRINE HYDROCHLORIDE 50 MCG: 10 INJECTION INTRAVENOUS at 08:57

## 2021-03-09 RX ADMIN — SODIUM CHLORIDE: 9 INJECTION, SOLUTION INTRAVENOUS at 12:52

## 2021-03-09 ASSESSMENT — PULMONARY FUNCTION TESTS
PIF_VALUE: 21
PIF_VALUE: 0
PIF_VALUE: 19
PIF_VALUE: 0
PIF_VALUE: 20
PIF_VALUE: 19
PIF_VALUE: 19
PIF_VALUE: 21
PIF_VALUE: 16
PIF_VALUE: 20
PIF_VALUE: 21
PIF_VALUE: 20
PIF_VALUE: 20
PIF_VALUE: 18
PIF_VALUE: 2
PIF_VALUE: 19
PIF_VALUE: 21
PIF_VALUE: 19
PIF_VALUE: 0
PIF_VALUE: 20
PIF_VALUE: 0
PIF_VALUE: 22
PIF_VALUE: 21
PIF_VALUE: 20
PIF_VALUE: 19
PIF_VALUE: 21
PIF_VALUE: 20
PIF_VALUE: 22
PIF_VALUE: 21
PIF_VALUE: 21
PIF_VALUE: 19
PIF_VALUE: 19
PIF_VALUE: 0
PIF_VALUE: 19
PIF_VALUE: 22
PIF_VALUE: 19
PIF_VALUE: 21
PIF_VALUE: 19
PIF_VALUE: 21
PIF_VALUE: 18
PIF_VALUE: 20
PIF_VALUE: 26
PIF_VALUE: 20
PIF_VALUE: 21
PIF_VALUE: 21
PIF_VALUE: 1

## 2021-03-09 ASSESSMENT — PAIN - FUNCTIONAL ASSESSMENT: PAIN_FUNCTIONAL_ASSESSMENT: 0-10

## 2021-03-09 ASSESSMENT — PAIN DESCRIPTION - DESCRIPTORS
DESCRIPTORS: BURNING;CRAMPING

## 2021-03-09 ASSESSMENT — PAIN DESCRIPTION - ORIENTATION
ORIENTATION: RIGHT

## 2021-03-09 ASSESSMENT — PAIN DESCRIPTION - PAIN TYPE
TYPE: SURGICAL PAIN
TYPE: SURGICAL PAIN

## 2021-03-09 ASSESSMENT — PAIN SCALES - GENERAL
PAINLEVEL_OUTOF10: 0
PAINLEVEL_OUTOF10: 0
PAINLEVEL_OUTOF10: 7
PAINLEVEL_OUTOF10: 0
PAINLEVEL_OUTOF10: 0

## 2021-03-09 ASSESSMENT — PAIN DESCRIPTION - ONSET: ONSET: ON-GOING

## 2021-03-09 ASSESSMENT — PAIN DESCRIPTION - FREQUENCY
FREQUENCY: CONTINUOUS

## 2021-03-09 ASSESSMENT — PAIN DESCRIPTION - LOCATION
LOCATION: HIP

## 2021-03-09 NOTE — PROGRESS NOTES
5SE Called, nurse to nurse given. Patients test results review, VS reported to receiving nurse. Any and all important information regarding patient disclosed. xrays recalled for and they will do them in her room as they have no one to come at this time.

## 2021-03-09 NOTE — PROGRESS NOTES
Physical Therapy  Physical Therapy Initial Assessment     Name: Tiffany Barreto  : 1955  MRN: [id number]    Referring Provider:  Twyla Carreon DO    Date of Service: 3/9/2021    Evaluating PT:  Almazfab Be, PT   Room #:  8684/6004-Y  Diagnosis: Arthritis of right hip [M16.11]  Status post total hip replacement, right [Z96.641]     PMHx/PSHx:   has a past medical history of Anxiety, Depression, and Sjogren's disease ([facility] [address] has a past surgical history that includes Dilation and curettage of uterus (); Cataract removal (Left); Shoulder arthroscopy (Right, 2015); and Total hip arthroplasty (Right, 3/9/2021). Procedure/Surgery:  3/9/21 R THR  Precautions:  Falls, anterior hip precautions , WBAT (weight bearing as tolerated),   Equipment Needs:  Wheeled Walker      SUBJECTIVE:    Patient lives with spouse  in a ranch home  with 2 steps to enter with Rail  Bed is on 1 floor and bath is on 1 floor. Patient ambulated independently  PTA. Equipment owned: Cane and Jess Miller,     present and observed session. OBJECTIVE:   Initial Evaluation  Date: 3/9/21 Treatment Short Term/ Long Term   Goals   AM-PAC 6 Clicks 99/17     Was pt agreeable to Eval/treatment? yes     Does pt have pain? minimal     Bed Mobility  Rolling: NT  Supine to sit: Min  Sit to supine: NT  Scooting: Min  Rolling: Ind  Supine to sit: Ind  Sit to supine: Ind  Scooting: Ind   Transfers Sit to stand: Min to fww  Stand to sit: Min  Stand pivot: Min with fww  Sit to stand: Mod Ind  Stand to sit: Mod Ind  Stand pivot: Mod Ind   Ambulation    40 feet with fww Min cues for sequencing. 150+ feet with fww Mod Ind   Stair negotiation: ascended and descended  NT  2 steps with 1 rail Min   ROM BUE:  See OT eval  BLE:  wfl BLE     Strength BUE: See OT eval   RLE:  4-/5  LLE:   4+/5  4+/5   Balance Sitting EOB:  Ind  Dynamic Standing:  Min with fww  Sitting EOB:  Ind  Dynamic Standing:   Mod Ind     Patient is Alert & Oriented x person, place, time and situation and follows directions   Sensation:  Pt denies numbness and tingling to extremities  Edema:  none    Vitals:  Blood Pressure at rest - Blood Pressure post session -   Heart Rate at rest - Heart Rate post session -   SPO2 at rest 96% SPO2 post session -%     Therapeutic Exercises: AROM for BLE while seated and supine. Patient education  Patient educated on role of Physical Therapy, risks of immobility, safety and plan of care,  importance of mobility while in hospital  and hip precautions     Patient response to education:   Pt verbalized understanding Pt demonstrated skill Pt requires further education in this area   yes yes no     ASSESSMENT:    Comments:  Patient was seen this date for PT evaluation. . Patient was agreeable to intervention. Results of the functional assessment are noted above. Upon entering the room patient was found supine in bed. . Assisted to EOB adhering to precautions. Sat EOB x 10 minutes to increase dynamic sitting balance and activity tolerance. Gait completed with fww with cues for sequencing. At end of session, patient in chair with  call light and phone within reach, all lines and tubes intact, nursing notified.  present. This patient can benefit from the continuation of skilled PT  to maximize functional level and return to PLOF.      Treatment:  Patient practiced and was instructed in the following treatment:    · Bed mobility training - pt given verbal and tactile cues to facilitate proper sequencing and safety during rolling and supine>sit as well as provided with physical assistance to complete task    · Assistive device training - pt educated on using [employer] during gait, [employer] approximation/negotiation, and hand placement during sit<>stand to [employer]  · STS and transfer training - educated on hand/foot placement, safety, and sequencing during STS and pivot transfers using assistive device  · Gait training - verbal cues for [employer] approximation/negotiation, upright posture, and safety during 90 and 180 degree turns during gait   Pt's/ family goals   1. home    Patient and or family understand(s) diagnosis, prognosis, and plan of care. yes    PLAN OF CARE:    PT care will be provided in accordance with the objectives noted above. Exercises and functional mobility practice will be used as well as appropriate assistive devices or modalities to obtain goals. Patient and family education will also be administered as needed. Current Treatment Recommendations     [] Strengthening     [] ROM   [] Balance Training   [] Endurance Training   [] Transfer Training   [] Gait Training   [] Stair Training   [] Positioning   [] Safety and Education Training   [] Patient/Caregiver Education   [] HEP  [] Other     Frequency of treatments: 2-5x/week x 1-2 weeks. Time in  1425  Time out  1455    Total Treatment Time  30 minutes     Evaluation Time includes thorough review of current medical information, gathering information on past medical history/social history and prior level of function, completion of standardized testing/informal observation of tasks, assessment of data and education on plan of care and goals.     CPT codes:  [] Low Complexity PT evaluation 93163  [x] Moderate Complexity PT evaluation 47341  [] High Complexity PT evaluation 28283  [] PT Re-evaluation 81849  [] Gait training 34418 - minutes  [] Manual therapy 17195 - minutes  [x] Therapeutic activities 97414 25 minutes  [] Therapeutic exercises 78176 - minutes  [] Neuromuscular reeducation 00675 - minutes       Komal Lua, [address] [facility]

## 2021-03-09 NOTE — PROGRESS NOTES
Covid Test done: 03/04/2021    Results: Negative    Self-quarantine guidelines followed since tested? Yes    Any unusual S/S or concerns expressed or observed?  No

## 2021-03-09 NOTE — H&P
Patient seen and examined preop.   No change in H&P. [FreeTextEntry1] : No change in medication.\par Echo\par Pharmacologic stress test.\par She is reluctant to do  the stress. There are no other good options for her--CTA cors will be mostly calcific. Cath is more invasive than she is willing to do. Exercise stress is not possible because of her ability to walk and because of necessity to come off carvedilol. \par In the interim take the Prilosec which she has at home and has never used. \par Greater than 50% of the 30 minutes encounter  with the patient was spent face to face on coordination of care and counseling.             .\par

## 2021-03-09 NOTE — ANESTHESIA PRE PROCEDURE
Department of Anesthesiology  Preprocedure Note       Name:  Aneudy Quijano   Age:  61 y.o.  :  1955                                          MRN:  [id number]         Date:  3/9/2021      Surgeon: Nba Locke):  Gali Figueroa MD    Procedure: Procedure(s):  RIGHT HIP TOTAL ARTHROPLASTY--ZAYDA    Medications prior to admission:   Prior to Admission medications    Medication Sig Start Date End Date Taking? Authorizing Provider   Cholecalciferol (VITAMIN D3) 25 MCG (1000 UT) CAPS Take 2 capsules by mouth daily    Yes Historical Provider, MD   venlafaxine (EFFEXOR) 37.5 MG tablet Take 37.5 mg by mouth daily   Yes Historical Provider, MD   diclofenac (VOLTAREN) 75 MG EC tablet Take 1 tablet by mouth 2 times daily 20  Yes Gali Figueroa MD   NALTREXONE HCL PO Take 3 mg by mouth nightly    Yes Historical Provider, MD   sulfamethoxazole-trimethoprim (BACTRIM DS) 800-160 MG per tablet Take 1 tablet by mouth 2 times daily for 10 days 3/3/21 3/13/21  Gali Figueroa MD       Current medications:    Current Facility-Administered Medications   Medication Dose Route Frequency Provider Last Rate Last Admin    acetaminophen (TYLENOL) tablet 1,000 mg  1,000 mg Oral Once Gali Figueroa MD        gabapentin (NEURONTIN) capsule 600 mg  600 mg Oral Once Gali Figueroa MD        celecoxib (CELEBREX) capsule 200 mg  200 mg Oral Once Gali Figueroa MD        dexamethasone (DECADRON) injection 8 mg  8 mg Intravenous Once Gali Figueroa MD        0.9 % sodium chloride infusion   Intravenous Continuous Gali Figueroa MD        sodium chloride flush 0.9 % injection 10 mL  10 mL Intravenous 2 times per day Gali Figueroa MD        sodium chloride flush 0.9 % injection 10 mL  10 mL Intravenous PRN Gali Figueroa MD        ceFAZolin (ANCEF) 2000 mg in sterile water 20 mL IV syringe  2,000 mg Intravenous On Call to [address] Michelle Pastor MD           Allergies:     Allergies

## 2021-03-09 NOTE — OP NOTE
Operative Note      Patient: Chiquita Sen  YOB: 1955  MRN: [id number]    Date of Procedure: 3/9/2021    Pre-Op Diagnosis: OSTEOARTHRITIS    Post-Op Diagnosis: Same       Procedure(s):  RIGHT HIP TOTAL ARTHROPLASTY--ZAYDA    Surgeon(s):  Aubree Cerrato MD    Assistant:   Resident: Abhay Bagley DO; Chio Mcallister DO; Ioana Sullivan DO    Anesthesia: General    Estimated Blood Loss (mL): 340     Complications: None    Specimens:   ID Type Source Tests Collected by Time Destination   A : RIGHT FEMORAL HEAD Bone Hip SURGICAL PATHOLOGY Aubree Cerrato MD 3/9/2021 2636        Implants:  Implant Name Type Inv. Item Serial No.  Lot No. LRB No. Used Action   SHELL ACET SZ D [id number] CLUS H TRITANIUM PRESSFIT YARA  SHELL ACET SZ D [id number] 3 CLUS H TRITANIUM PRESSFIT YARA  [employer] [facility] [id number] Right 1 Implanted   SCREW BNE L20MM [id number] LO PROF HEX TRIDENT LL  SCREW BNE L20MM [id number] LO PROF HEX TRIDENT LL  ZAYDA [facility] [id number] Right 1 Implanted   SCREW BNE L40MM DIA6. 5MM LO PROF HEX TRIDENT LL  SCREW BNE L40MM DIA6. 5MM LO PROF HEX TRIDENT LL  [employer] [facility] [id number] Right 1 Implanted   LINER ACET SZ D [id number] ID32MM THK5. 9MM 0DEG HIP X3  LINER ACET SZ D [id number] ID32MM THK5. 9MM 0DEG HIP X3  [employer] [facility] [id number] Right 1 Implanted   STEM FEM SZ 3 L102MM NK L30MM 38MM OFFSET 127DEG HIP TI  STEM FEM SZ 3 L102MM NK L30MM 38MM OFFSET 127DEG HIP TI  [employer] [facility] [id number] Right 1 Implanted   HEAD FEM [id number] +0MM OFFSET HIP BIOLOX DELT CERAMIC TAPR  HEAD FEM [id number] +0MM OFFSET HIP BIOLOX DELT CERAMIC TAPR  [employer] [facility] [id number] Right 1 Implanted         Drains: * No LDAs found *    Findings: osteoarthritic right hip    Detailed Description of Procedure      Brief Hospital Course:  Tenna Pinta is a patient known to Aubree Cerrato MD practice with persistent complaints of Right hip pain.  Hip pain has failed to be relieved by non-operative conservative measures, and has began affecting daily activities of living. After examination of the patient, review of the radiologic studies, and appropriate pre-operative risk assessment, Catie Ellington MD recommended Right hip arthroplasty, which the patient was agreeable towards. Operative Course: Outside the operating suite, the patient was seen and identified. The operative site was marked and identified as appropriate by the patient, staff, surgeon, and Anesthesia. The patient was taken into the operating room, placed on the operative table, and placed under the appropriate amount of sedation under the care of the anesthesia team. The patient was placed into a lateral decubitus position. All bony prominences and neurovascular structures were well padded and protected. The operative site was then prepped and draped in a standard sterile fashion. An incision was made over the lateral trochanteric region and carried down to the level of the fascia elysia maintaining appropriate hemostasis with electrocautery. A small rent was placed in the fascia elysia. The knife used to extend the fascia elysia incision in line with its fibers to the length of the skin incision. A Charnley retractor was then placed in the anterior and posterior margin of the tensor fascia elysia incision, taking care not to violate any neurovascular structures. The anterior one-third of the gluteus medius tendon was identified. In line with its fibers, it was reflected using electrocautery off its trochanteric insertion. The gluteus medius and minimus tendons were reflected anteriorly down to the level of the hip capsule. The hip capsule was T'd up to the acetabulum and around the posterior medial and inferior aspects of the hip. The hip was dislocated carefully. An oscillating saw was then used to make the femoral neck cut at a 45-degree angle, 1 cm proximal to the lesser trochanter.  The femoral head was taken for sizing and specimen. At this point, the acetabulum was then inspected. Posterior and anterior acetabular retractors were then placed, taking care not to violate any neurovascular structures. All bony fragments and soft tissue interposed were then sharply excised. The labrum was sharply debrided. Reaming of the acetabulum was started, and carried sequentially up to the appropriate size. A size 48 cup was chosen to be the right size. Copious irrigation was performed of the wound. A 48 mm sized acetabular component was then impacted into the appropriate position. First a 20 mm screw was inserted followed by a 40 mm screw both had great purchase. Standard polyethylene liner impacted, and it was found to be appropriately stable afterward. Attention was then turned to the femur. A Woody retractor was placed under the femoral neck cut. A box osteotome was introduced into the femoral neck which was removed. Then a T-handle canal finder was then introduced and removed. Broaching began at a size 0 and was carried up sequentially to a size 3 broach, where we found appropriate stability, axially and rotationally. The broach was then removed. The corresponding sized femoral component was then impacted in the appropriate position, which was found to have appropriate stable fit. A standard trial head was chosen. The hip was reduced, taken through a range of motion, and was found to be appropriately stable. The hip was then dislocated. The femoral head trial component was then removed. The actual femoral head component was impacted into the appropriate position. The hip was then reduced, taken through a range of motion, and was found to be appropriately stable. Copious irrigation was performed of the joint. Platelet-rich plasma gel was then injected into the joint along with .5 mg of vancomycin powder.  Also, #1 Ethibond was used to reapproximate the gluteus minimus tendon to its trochanteric insertion followed by re approximation of the gluteus medius tendon to the trochanter with a #1 Ethibond. Copious irrigation was performed once more. The tensor fascia elysia was then closed with an #1 Ethibond followed by a running stratafix suture. Copious irrigation was performed once more. Platelet-poor plasma gel was injected into the subcutaneous tissues along with 0.5 mg of vancomycin powder. 2-0 Vicryl were used to close the subcutaneous layer. Staples were used for the skin. A sterile maryjane dressing was placed over the wound. The patient was awakened from anesthesia, transferred to a [place] [address] bed, and taken to the PACU in stable condition. Disposition: The patient was taken to PACU in stable condition. Once stable, the patient will be transferred to the floor. Orders have been provided to begin physical therapy, weight bear as tolerated Right lower extremity. Patient received a dose of 2 g preoperatively. We will continue this for 24 hours postoperatively for infection prophylaxis. The patient will also be started on  BID for DVT prophylaxis. We have consulted  and case management for discharge planning and consulted the PCP for medical management. It should be noted that Dr. Liston Sacks was present for the entirety of the procedure.         Electronically signed by Raymond Shane DO on 3/9/2021 at 9:37 AM

## 2021-03-09 NOTE — PROGRESS NOTES
Povidine-iodine 5% nasal antiseptic pre-op prep completed 15 seconds per nare and repeated. 2% CHG pre-op skin prep completed in appropriate order using all 6 cloths: Patient performed at home.

## 2021-03-09 NOTE — PROGRESS NOTES
distance  Modified [place]    Balance Sitting:     Static:  Sup    Dynamic:SBA  Standing: Min A     Activity Tolerance Fair with lt. ax.  spO2 & HR WNL throughout. Good with lt. Ax. Visual/  Perceptual Glasses: yes        Safety Awareness Fair/good                    good     Hand dominance: L     Strength ROM Additional Info:    RUE  4+/5  WFL good  and wfl FMC/dexterity noted during ADL tasks     LUE 4+/5  WFL good  and wfl FMC/dexterity noted during ADL tasks       Hearing: WFL   Sensation:  Pt. c/o no numbness/ tingling B  UE  Tone: WFL   Edema: none noted B UE                            Comments: Upon arrival, patient supine in bed, cleared by Nursing & Ortho,  agreeable to OT. Pt demonstrating good understanding of education/techniques, requiring additional training / education. At end of session, patient seated in bedside chair, all needs met, RN notified, with call light and phone within reach, all lines and tubes intact. Pt would benefit from continued skilled OT to increase safety and independence with completion of ADL/iADL tasks, functional transfers/mobility to improve independence and quality of life. Treatment:  OT services provided include: facilitation of safe ADLs/functional transfers/mobility, skilled monitoring of vitals & pt.'s response to treatment, instruction/training on safe & adapted techniques within precautions for completion of ADLs, proper posture and/or positioning, facilitation of functional seated & standing tolerance & balance ax. during ADLs and functional ax., skilled cuing on hand placement & proper body mechanics during functional transfer/mobility training with focus on safety, technique, precautions. Pt.  also Instructed RE: safe transfers/mobility, ADL training, role of OT, E.C. techniques, treatment plan, recs. , prec.      Eval Complexity: Low    Assessment of current deficits:    Functional mobility [x]  ADLs [x] Strength [x]  Cognition []  Functional transfers  [x] IADLs [x] Safety Awareness [x]  Endurance [x]  Fine Motor Coordination [] Balance [x] Vision/perception [] Sensation []   Gross Motor Coordination [] ROM [] Delirium []                  Motor Control []   Plan of Care: 2-4 days/week for 1-2 weeks PRN   ADL retraining/adapted techniques and AE recommendations to increase functional independence within precautions                    Energy conservation techniques to improve tolerance for selfcare routine   Functional transfer/mobility training/DME recommendations for increased independence, safety and fall prevention         Patient/family education to increase safety and functional independence             Environmental modifications for safe mobility and completion of ADLs                     Therapeutic activity to improve functional performance during ADLs. Therapeutic exercise to improve tolerance and functional strength for ADLs   Balance retraining/tolerance tasks for facilitation of postural control with dynamic challenges during ADLs . Positioning to improve functional independence- abiding by hip prec. Rehab Potential:  Good for established goals     Patient / Family Goal: to return home with  soon    Patient and/or family were instructed on functional diagnosis, prognosis/goals and OT plan of care. Demonstrated good understanding.      Eval Complexity: Low      Time In: 14:25  Time Out: 14:50  Total Treatment Time: 15    Min Units   OT Eval Low 96737  x     OT Eval Medium 56624      OT Eval High 98184       OT Re-Eval [id number]       Therapeutic Ex 84629       Therapeutic Activities 88594  05     ADL/Self Care 83083  10  1   Orthotic Management 22795       Neuro Re-Ed 51069       Non-Billable Time          Evaluation Time includes thorough review of current medical information, gathering information on past medical history/social history and prior level of function, completion of standardized testing/informal observation of tasks, assessment of data and education on plan of care and goals. Batsheva Bowers, OTR/L   License #  [id number]

## 2021-03-09 NOTE — PLAN OF CARE
Problem: Infection:  Goal: Will remain free from infection  Description: Will remain free from infection  Outcome: Met This Shift     Problem: Safety:  Goal: Free from accidental physical injury  Description: Free from accidental physical injury  Outcome: Met This Shift  Goal: Free from intentional harm  Description: Free from intentional harm  Outcome: Met This Shift     Problem: Daily Care:  Goal: Daily care needs are met  Description: Daily care needs are met  Outcome: Met This Shift     Problem: Pain:  Goal: Patient's pain/discomfort is manageable  Description: Patient's pain/discomfort is manageable  Outcome: Met This Shift     Problem: Discharge Planning:  Goal: Patients continuum of care needs are met  Description: Patients continuum of care needs are met  Outcome: Met This Shift     Problem: Skin Integrity:  Goal: Skin integrity will stabilize  Description: Skin integrity will stabilize  Outcome: Ongoing

## 2021-03-09 NOTE — PROGRESS NOTES
Department of Orthopedic Surgery  Resident Progress Note    Patient seen and examined. Pain controlled. No new complaints. Denies chest pain, shortness of breath, dizziness/lightheadedness. Doing well since surgery pain is not too bad. Has not work with PT yet.      VITALS:  /78   Pulse 71   Temp 97.6 °F (36.4 °C) (Temporal)   Resp 18   Ht 5' 4\" (1.626 m)   Wt 168 lb (76.2 kg)   SpO2 95%   BMI 28.84 kg/m²     General: alert and oriented to person, place and time, well-developed and well-nourished, in no acute distress    MUSCULOSKELETAL:   right lower extremity:  · Dressing C/D/I  · Compartments soft and compressible  · +PF/DF/EHL  · +2/4 DP & PT pulses, Brisk Cap refill, Toes warm and perfused  · Distal sensation grossly intact to Peroneals, Sural, Saphenous, and tibial nrs    CBC:   Lab Results   Component Value Date    WBC 3.6 03/02/2021    HGB 11.3 03/02/2021    HCT 36.0 03/02/2021     03/02/2021     PT/INR:    Lab Results   Component Value Date    PROTIME 12.4 12/16/2012    INR 1.2 12/16/2012       ASSESSMENT  · S/P R COLLINS    PLAN      · Continue physical therapy and protocol: WBAT - RLE  · 24 hour abx coverage  · Deep venous thrombosis prophylaxis - 325 ASA BID, early mobilization  · PT/OT  · Pain Control: IV and PO  · Monitor H&H  · D/C Plan:  Planning

## 2021-03-09 NOTE — ANESTHESIA POSTPROCEDURE EVALUATION
Department of Anesthesiology  Postprocedure Note    Patient: Laure Salu  MRN: [id number]  [place]: 1955  Date of evaluation: 3/9/2021  Time:  12:57 PM     Procedure Summary     Date: 03/09/21 Room / Location: [facility] OR 08 / [facility]    Anesthesia Start:  Anesthesia Stop:     Procedure: RIGHT HIP TOTAL ARTHROPLASTY--ZAYDA (Right ) Diagnosis: (OSTEOARTHRITIS)    Surgeons: Trina Kenny MD Responsible Provider:     Anesthesia Type: general ASA Status: 2          Anesthesia Type: general    Jigar Phase I: Jigar Score: 9    Jigar Phase II:      Last vitals: Reviewed and per EMR flowsheets.        Anesthesia Post Evaluation    Patient location during evaluation: PACU  Patient participation: complete - patient participated  Level of consciousness: awake  Pain score: 3  Airway patency: patent  Nausea & Vomiting: no nausea and no vomiting  Complications: no  Cardiovascular status: blood pressure returned to baseline  Respiratory status: acceptable  Hydration status: euvolemic

## 2021-03-10 ENCOUNTER — TELEPHONE (OUTPATIENT)
Dept: ORTHOPEDIC SURGERY | Age: 66
End: 2021-03-10

## 2021-03-10 VITALS
TEMPERATURE: 97.6 F | OXYGEN SATURATION: 98 % | WEIGHT: 168 LBS | DIASTOLIC BLOOD PRESSURE: 57 MMHG | RESPIRATION RATE: 18 BRPM | BODY MASS INDEX: 28.68 KG/M2 | SYSTOLIC BLOOD PRESSURE: 113 MMHG | HEART RATE: 74 BPM | HEIGHT: 64 IN

## 2021-03-10 LAB
ANION GAP SERPL CALCULATED.3IONS-SCNC: 7 MMOL/L (ref 7–16)
BUN BLDV-MCNC: 13 MG/DL (ref 8–23)
CALCIUM SERPL-MCNC: 8.2 MG/DL (ref 8.6–10.2)
CHLORIDE BLD-SCNC: 106 MMOL/L (ref 98–107)
CO2: 25 MMOL/L (ref 22–29)
CREAT SERPL-MCNC: 0.7 MG/DL (ref 0.5–1)
GFR AFRICAN AMERICAN: >60
GFR NON-AFRICAN AMERICAN: >60 ML/MIN/1.73
GLUCOSE BLD-MCNC: 112 MG/DL (ref 74–99)
HCT VFR BLD CALC: 28 % (ref 34–48)
HEMOGLOBIN: 9 G/DL (ref 11.5–15.5)
MCH RBC QN AUTO: 29.7 PG (ref 26–35)
MCHC RBC AUTO-ENTMCNC: 32.1 % (ref 32–34.5)
MCV RBC AUTO: 92.4 FL (ref 80–99.9)
PDW BLD-RTO: 12.9 FL (ref 11.5–15)
PLATELET # BLD: 161 E9/L (ref 130–450)
PMV BLD AUTO: 12 FL (ref 7–12)
POTASSIUM REFLEX MAGNESIUM: 4 MMOL/L (ref 3.5–5)
RBC # BLD: 3.03 E12/L (ref 3.5–5.5)
SODIUM BLD-SCNC: 138 MMOL/L (ref 132–146)
WBC # BLD: 8.1 E9/L (ref 4.5–11.5)

## 2021-03-10 PROCEDURE — 36415 COLL VENOUS BLD VENIPUNCTURE: CPT

## 2021-03-10 PROCEDURE — 97530 THERAPEUTIC ACTIVITIES: CPT

## 2021-03-10 PROCEDURE — 99024 POSTOP FOLLOW-UP VISIT: CPT | Performed by: ORTHOPAEDIC SURGERY

## 2021-03-10 PROCEDURE — 97535 SELF CARE MNGMENT TRAINING: CPT

## 2021-03-10 PROCEDURE — 85027 COMPLETE CBC AUTOMATED: CPT

## 2021-03-10 PROCEDURE — G0378 HOSPITAL OBSERVATION PER HR: HCPCS

## 2021-03-10 PROCEDURE — 6370000000 HC RX 637 (ALT 250 FOR IP): Performed by: ORTHOPAEDIC SURGERY

## 2021-03-10 PROCEDURE — 80048 BASIC METABOLIC PNL TOTAL CA: CPT

## 2021-03-10 PROCEDURE — 2580000003 HC RX 258: Performed by: ORTHOPAEDIC SURGERY

## 2021-03-10 RX ADMIN — DOCUSATE SODIUM 50 MG AND SENNOSIDES 8.6 MG 1 TABLET: 8.6; 5 TABLET, FILM COATED ORAL at 09:58

## 2021-03-10 RX ADMIN — MAGNESIUM HYDROXIDE 30 ML: 2400 SUSPENSION ORAL at 09:59

## 2021-03-10 RX ADMIN — SODIUM CHLORIDE, PRESERVATIVE FREE 10 ML: 5 INJECTION INTRAVENOUS at 09:59

## 2021-03-10 RX ADMIN — ACETAMINOPHEN 650 MG: 325 TABLET ORAL at 06:35

## 2021-03-10 RX ADMIN — ACETAMINOPHEN 650 MG: 325 TABLET ORAL at 12:54

## 2021-03-10 RX ADMIN — ASPIRIN 325 MG: 325 TABLET, COATED ORAL at 09:58

## 2021-03-10 RX ADMIN — VENLAFAXINE HYDROCHLORIDE 37.5 MG: 75 TABLET ORAL at 09:58

## 2021-03-10 NOTE — PLAN OF CARE
Problem: Infection:  Goal: Will remain free from infection  Description: Will remain free from infection  3/10/2021 0236 by Felipe Cabrera RN  Outcome: Met This Shift  3/9/2021 1757 by Lizzy Landers RN  Outcome: Met This Shift

## 2021-03-10 NOTE — PROGRESS NOTES
NURSES NOTE [facility]  POD # 1 RIGHT HIP TOTAL JOINT ARTHROPLASTY     Subjective: \"My old pain is gone. \" \"It doesn't feel like I am sitting on a golf ball anymore. \"                                                                                                                                           Objective/Assessment: Patient resting quietly in bed     Visited patient with Dr. Rayshawn Hammond who examined patient and discussed discharge plans with the patient    Neuro:  Alert and oriented, pleasant and conversant  Appearance:  No distress   Pain Control:  Effective with prescribed pain relievers   Breathing:  Respirations - regular rate and rhythm. Patient denies CP or SOB  Saline daniela:  Maintained  Appetite:  Restored   Voiding:  Patient assisted onto BS commode by nursing during the night. Patient denies any difficulty voiding  Abdomen:  Soft   Expelling Flatus:  No   BM:  No. Patient reports her usual bowel pattern is every 2 weeks  Dressing:  JANE maintained, few very small areas of dark drainage present otherwise dressing is C/D/I. Battery pack functioning properly  Resolving ecchymosis  Leg lengths appear equal and in straight alignment  Motion:  Patient ambulated 40 feet with WW with PT POD 0  Compartments:  Thigh is slightly firm to touch, non tender - calf is soft and non tender  Neuro / Circulatory: Intact          BP (!) 101/51   Pulse 78   Temp 98.2 °F (36.8 °C)   Resp 16   Ht 5' 4\" (1.626 m)   Wt 168 lb (76.2 kg)   SpO2 95%   BMI 28.84 kg/m²     Recent Labs     03/10/21  0633   WBC 8.1   HGB 9.0*   HCT 28.0*      Pre  Op 3/02/2021:  Hgb. 11.3  Hct. 36.0  EBL in  ml.   Hgb was low pre op  Acute Blood Loss / Post Op Anemia    Plan:   Early Mobilization / Prevent Post Op Complications  Continue PT: Gait training / ambulation, ROM & strengthening exercises, modalities   will attend second session PT for instruction on assisting patient at home  For possible discharge home today if safe and independent with bed / chair mobility, ambulation, stair navigation and car transfers and pain is adequately controlled with oral pain relievers. Social Service to arrange for Home Physical Therapy 3 times a week until seen in office on Monday, March 22 cd. Patient requests [facility] Physical Therapy is needed due to:  1.) osteoarthritis hip, 2.) fresh post op RIGHT HIP TOTAL JOINT ARTHROPLASTY, 3.)  post operative weakness and / or pain, 4.) for balance,gait and transfer, 5.) unable to leave home without maximum effort and requires assistance of wheelchair or walker  [facility]: evaluate and treat for strength training, gait training, balance training, safety training, functional mobility, home assessment due to fall risk. DME: Wheeled walker needed for limited mobility d/t surgery - RIGHT HIP TOTAL JOINT ARTHROPLASTY  DME: 3:1 commode due to decreased strength and weakness post op. Patient is confined to one floor. Discharge instructions discussed with patient  S/S Infection and DVT discussed with patient - Patient instructed to call the office  S/S PE discussed with patient - Patient instructed to go to ER immediately  Continue Pulmonary Hygiene: Cough, deep breathe, incentive spirometer every 1 to 2 hours while awake until fully mobile  Follow Patient Education Guide: Iron Replacement given to patient pre op. Patient instructed to continue eating foods rich in iron and vitamin C for one month post op   To prevent constipation while taking opioid pain relievers; patient instructed to drink plenty of water, eat fruits and vegetables, increase the fiber in her diet, drink prune, plum or apple juice, eat dried fruit such as prunes. May take OTC aids: Stool softeners, suppositories, Fleet enema.   If constipation persists: Contact PCP  Handout: Care of JANE Dressing given to patient    Remove JANE dressing on Tuesday, March 16 th  Patient expresses understanding and is in agreement with the plan  Prescription for pain reliever - Percocet 5/325 (28 tablets / 0 refills) take one tablet every 6 hours as needed for moderate to severe pain up to 7 days. For less pain may take Tylenol, not to exceed 3000 mg / 24 hrs.   DVT Prophylaxis: Continue lower extremity circulation exercises when at rest, frequent ambulation and medication - one adult enteric coated 325 mg aspirin tablet twice a day for 4 weeks  Patient will call office for any questions, concerns or problems [phone], ext. [phone]  Follow up with Dr. Kelvin Valdez  on Monday, March 22 cd for staple removal, x-ray, exam by the surgeon and a prescription for Out Patient Physical Therapy    Magda Nava RN, BSN, ONC, scribe for Dr. Kelvin Valdez MD - Orthopedic Surgeon  3/10/2021 7:33 AM

## 2021-03-10 NOTE — PROGRESS NOTES
Physical Therapy      Name: Linden Carr  : 1955  MRN: [id number]    Referring Provider:  Aleda Prader, DO    Date of Service: 3/10/2021    Evaluating PT:  Nury Fisher, PT   Room #:  9480/1440-L  Diagnosis: Arthritis of right hip [M16.11]  Status post total hip replacement, right [Z96.641]     PMHx/PSHx:   has a past medical history of Anxiety, Depression, and Sjogren's disease ([facility] [address] has a past surgical history that includes Dilation and curettage of uterus (); Cataract removal (Left); Shoulder arthroscopy (Right, 2015); and Total hip arthroplasty (Right, 3/9/2021). Procedure/Surgery:  3/9/21 R THR  Precautions:  Falls, anterior hip precautions , WBAT (weight bearing as tolerated),   Equipment Needs:  Wheeled Walker      SUBJECTIVE:    Patient lives with spouse  in a ranch home  with 2 steps to enter with Rail  Bed is on 1 floor and bath is on 1 floor. Patient ambulated independently  PTA. Equipment owned: [employer],     present and observed session. OBJECTIVE:   Initial Evaluation  Date: 3/9/21 Treatment  3/10 AM Short Term/ Long Term   Goals   AM-PAC 6 Clicks 75/59 60/67    Was pt agreeable to Eval/treatment? yes yes    Does pt have pain? minimal Very minimal 2/10. States only Tylenol    Bed Mobility  Rolling: NT  Supine to sit: Min  Sit to supine: NT  Scooting: Min NBT up in chair pre and post Rolling: Ind  Supine to sit: Ind  Sit to supine: Ind  Scooting: Ind   Transfers Sit to stand: Min to fww  Stand to sit: Min  Stand pivot: Min with fww Sit to stand: SBA to fww  Stand to sit: SBA  Stand pivot: SBA with fww  Reviewed car transfer with patient and . Good understanding. Sit to stand: Mod Ind  Stand to sit: Mod Ind  Stand pivot: Mod Ind   Ambulation    40 feet with fww Min cues for sequencing.   100 feet seated rest then 100 back to room 150+ feet with fww Mod Ind   Stair negotiation: ascended and descended  NT Will complete steps in pm session this date. 2 steps with 1 rail Min   ROM BUE:  See OT eval  BLE:  wfl BLE     Strength BUE: See OT eval   RLE:  4-/5  LLE:   4+/5  4+/5   Balance Sitting EOB:  Ind  Dynamic Standing:  Min with fww  Sitting EOB:  Ind  Dynamic Standing: Mod Ind     Patient is Alert & Oriented x person, place, time and situation and follows directions   Sensation:  Pt denies numbness and tingling to extremities  Edema:  none    Vitals:  Blood Pressure at rest - Blood Pressure post session -   Heart Rate at rest - Heart Rate post session -   SPO2 at rest 96% RA SPO2 post session -%     Therapeutic Exercises: AROM for BLE while seated and supine. Patient education  Patient educated on role of Physical Therapy, risks of immobility, safety and plan of care,  importance of mobility while in hospital  and hip precautions     Patient response to education:   Pt verbalized understanding Pt demonstrated skill Pt requires further education in this area   yes yes no     ASSESSMENT:    Comments:  Patient was seen this date for PT treatment. . Patient was agreeable to intervention. Results of the functional assessment are noted above. Upon entering the room patient was found sitting in bedside chair. .Gait completed with fww now reciprocating gait pattern. Discussed car transfers and elevated bed transfer with  and patient. Will complete steps in the pm. Only 2 small steps to enter the home. At end of session, patient in chair with  call light and phone within reach, all lines and tubes intact, nursing notified.  present. This patient can benefit from the continuation of skilled PT  to maximize functional level and return to PLOF.      Treatment:  Patient practiced and was instructed in the following treatment:    · Bed mobility training - pt given verbal and tactile cues to facilitate proper sequencing and safety during rolling and supine>sit as well as provided with physical assistance to complete task · Assistive device training - pt educated on using [employer] during gait, [employer] approximation/negotiation, and hand placement during sit<>stand to [employer]  · STS and transfer training - educated on hand/foot placement, safety, and sequencing during STS and pivot transfers using assistive device  · Gait training - verbal cues for [employer] approximation/negotiation, upright posture, and safety during 90 and 180 degree turns during gait   Pt's/ family goals   1. Home with . Patient and or family understand(s) diagnosis, prognosis, and plan of care. yes    PLAN OF CARE:    PT care will be provided in accordance with the objectives noted above. Exercises and functional mobility practice will be used as well as appropriate assistive devices or modalities to obtain goals. Patient and family education will also be administered as needed. Current Treatment Recommendations     [x] Strengthening     [x] ROM   [x] Balance Training   [x] Endurance Training   [x] Transfer Training   [x] Gait Training   [x] Stair Training   [] Positioning   [] Safety and Education Training   [] Patient/Caregiver Education   [] HEP  [] Other     Frequency of treatments: 2-5x/week x 1-2 weeks. Time in  0935  Time out  1000    Total Treatment Time  25 minutes     Evaluation Time includes thorough review of current medical information, gathering information on past medical history/social history and prior level of function, completion of standardized testing/informal observation of tasks, assessment of data and education on plan of care and goals.     CPT codes:  [] Low Complexity PT evaluation 97366  [] Moderate Complexity PT evaluation 38174  [] High Complexity PT evaluation 30147  [] PT Re-evaluation 88156  [] Gait training 13460 - minutes  [] Manual therapy 15056 - minutes  [x] Therapeutic activities 68668 25 minutes  [] Therapeutic exercises 81987 - minutes  [] Neuromuscular reeducation 07602 - minutes       Nury Fisher, [address] [facility]

## 2021-03-10 NOTE — CONSULTS
history includes Cancer in her brother and father; Liver Disease in her mother. REVIEW OF SYSTEMS:  As above in the HPI, otherwise negative    PHYSICAL EXAM:    Vitals:  BP (!) 101/51   Pulse 78   Temp 98.2 °F (36.8 °C)   Resp 16   Ht 5' 4\" (1.626 m)   Wt 168 lb (76.2 kg)   SpO2 95%   BMI 28.84 kg/m²     General:  Awake, alert, oriented X 3. Well developed, well nourished, well groomed. No apparent distress. HEENT:  Normocephalic, atraumatic. Pupils equal, round, reactive to light. No scleral icterus. No conjunctival injection. Normal lips, teeth, and gums. No nasal discharge. Neck:  Supple  Heart:  RRR, no murmurs, gallops, rubs  Lungs:  CTA bilaterally, bilat symmetrical expansion, no wheeze, rales, or rhonchi  Abdomen: Bowel sounds hypoactive, soft, nontender, no masses, no organomegaly, no peritoneal signs  Extremities:  No clubbing, cyanosis, or edema  Skin:  Warm and dry, no open lesions or rash  Neuro:  Cranial nerves 2-12 intact, no focal deficits  Breast: deferred  Rectal: deferred  Genitalia:  deferred    LABS:    CBC:   Lab Results   Component Value Date    WBC 8.1 03/10/2021    RBC 3.03 03/10/2021    HGB 9.0 03/10/2021    HCT 28.0 03/10/2021    MCV 92.4 03/10/2021    MCH 29.7 03/10/2021    MCHC 32.1 03/10/2021    RDW 12.9 03/10/2021     03/10/2021    MPV 12.0 03/10/2021     CMP:    Lab Results   Component Value Date     03/10/2021    K 4.0 03/10/2021     03/10/2021    CO2 25 03/10/2021    BUN 13 03/10/2021    CREATININE 0.7 03/10/2021    GFRAA >60 03/10/2021    LABGLOM >60 03/10/2021    GLUCOSE 112 03/10/2021    PROT 9.4 03/02/2021    LABALBU 4.0 03/02/2021    CALCIUM 8.2 03/10/2021    BILITOT 0.3 03/02/2021    ALKPHOS 89 03/02/2021    AST 24 03/02/2021    ALT 22 03/02/2021       ASSESSMENT:      Active Problems:    Primary osteoarthritis of right hip    Status post total hip replacement, right  Resolved Problems:    * No resolved hospital problems. *      PLAN:    1. Pain management  2. Bowel management  3. IV hydration  4. Resume home medication    DT prophylaxis  PT OT  Discharge planning  Case discussed with attending, and agreed upon plan of care. TRICE Craig  10:24 AM  3/10/2021       No acute compliants  Monitor h/h  Bowel regimen   Do not need to hold dc till bm   Ok for dc once         I personally saw, examined and provided care for the patient. Radiographs, labs and medication list were reviewed by me independently. The case was discussed in detail and plans for care were established. Review of [address] [place] [address], documentation was conducted and revisions were made as appropriate directly by me. I agree with the above documented exam, problem list, and plan of care.      Obed Leal MD  1:18 PM  3/10/2021

## 2021-03-10 NOTE — PROGRESS NOTES
regards to home setup, in which pt would benefit from shower bench to assist with bathing task for safety, with pt denying need for rasied commode, social work notified. At end of session, pt seated upright in chair, all lines and tubes intact, call light within reach. · Pt has made good progress towards set goals.    · Continue with current plan of care focusing on increasing of independency with transfers and ADL tasks      Treatment Time In: 8:40am            Treatment Time Out: 9:10am           Treatment Charges: Mins Units   Ther Ex  47558     Manual Therapy 19840     Thera Activities 75693 8 1   ADL/Home Mgt 04951 22 1   Neuro Re-ed 89751     Group Therapy      Orthotic manage/training  65854     Non-Billable Time     Total Timed Treatment 30 2        Josephine Olivas MORILLO/L [id number]

## 2021-03-10 NOTE — PLAN OF CARE
Problem: Infection:  Goal: Will remain free from infection  Description: Will remain free from infection  3/10/2021 1710 by Daniel Hennessy RN  Outcome: Completed  3/10/2021 1609 by Ksenia Puckett RN  Outcome: Met This Shift     Problem: Safety:  Goal: Free from accidental physical injury  Description: Free from accidental physical injury  3/10/2021 1710 by Daniel Hennessy RN  Outcome: Completed  3/10/2021 1609 by Ksenia Puckett RN  Outcome: Met This Shift  Goal: Free from intentional harm  Description: Free from intentional harm  3/10/2021 1710 by Daniel Hennessy RN  Outcome: Completed  3/10/2021 1609 by Ksenia Puckett RN  Outcome: Met This Shift     Problem: Daily Care:  Goal: Daily care needs are met  Description: Daily care needs are met  3/10/2021 1710 by Daniel Hennessy RN  Outcome: Completed  3/10/2021 1609 by Ksenia Puckett RN  Outcome: Met This Shift     Problem: Pain:  Goal: Patient's pain/discomfort is manageable  Description: Patient's pain/discomfort is manageable  3/10/2021 1710 by Daniel Hennessy RN  Outcome: Completed  3/10/2021 1609 by Ksenia Puckett RN  Outcome: Met This Shift     Problem: Skin Integrity:  Goal: Skin integrity will stabilize  Description: Skin integrity will stabilize  3/10/2021 1710 by Daniel Hennessy RN  Outcome: Completed  3/10/2021 1609 by Ksenia Puckett RN  Outcome: Ongoing     Problem: Discharge Planning:  Goal: Patients continuum of care needs are met  Description: Patients continuum of care needs are met  3/10/2021 1710 by Daniel Hennessy RN  Outcome: Completed  3/10/2021 1609 by Ksenia Puckett RN  Outcome: Met This Shift

## 2021-03-10 NOTE — PROGRESS NOTES
Physical Therapy      Name: Yane Candelario  : 1955  MRN: [id number]    Referring Provider:  Pam Carrion DO    Date of Service: 3/10/2021    Evaluating PT:  Judi Rosales, PT   Room #:  2072/4811-Z  Diagnosis: Arthritis of right hip [M16.11]  Status post total hip replacement, right [Z96.641]     PMHx/PSHx:   has a past medical history of Anxiety, Depression, and Sjogren's disease ([facility] [address] has a past surgical history that includes Dilation and curettage of uterus (); Cataract removal (Left); Shoulder arthroscopy (Right, 2015); and Total hip arthroplasty (Right, 3/9/2021). Procedure/Surgery:  3/9/21 R THR  Precautions:  Falls, anterior hip precautions , WBAT (weight bearing as tolerated),   Equipment Needs:  Wheeled Walker      SUBJECTIVE:    Patient lives with spouse  in a [place] home  with 2 steps to enter with Rail  Bed is on 1 floor and bath is on 1 floor. Patient ambulated independently  PTA. Equipment owned: Cane and [employer],     present and observed session. OBJECTIVE:   Initial Evaluation  Date: 3/9/21 Treatment  3/10 PM Short Term/ Long Term   Goals   AM-PAC 6 Clicks  41/77    Was pt agreeable to Eval/treatment? yes yes    Does pt have pain? minimal Very minimal /10. States only Tylenol    Bed Mobility  Rolling: NT  Supine to sit: Min  Sit to supine: NT  Scooting: Min Supine to sit: Ind  Sit to supine: Min LLE Rolling: Ind  Supine to sit: Ind  Sit to supine: Ind  Scooting: Ind   Transfers Sit to stand: Min to fww  Stand to sit: Min  Stand pivot: Min with fww Sit to stand: Ind to fww  Stand to sit: Ind  Stand pivot: Ind with fww  Reviewed car transfer with patient and . Good understanding. Sit to stand: Mod Ind  Stand to sit: Mod Ind  Stand pivot: Mod Ind   Ambulation    40 feet with fww Min cues for sequencing.   180 feet seated rest SBA fww.  150+ feet with fww Mod Ind   Stair negotiation: ascended and descended  NT 4 steps with R rail Min A.Patient cued on sequencing. 2 steps with 1 rail Min   ROM BUE:  See OT eval  BLE:  wfl BLE     Strength BUE: See OT eval   RLE:  4-/5  LLE:   4+/5  4+/5   Balance Sitting EOB:  Ind  Dynamic Standing:  Min with fww  Sitting EOB:  Ind  Dynamic Standing: Mod Ind     Patient is Alert & Oriented x person, place, time and situation and follows directions   Sensation:  Pt denies numbness and tingling to extremities  Edema:  none    Vitals:  Blood Pressure at rest - Blood Pressure post session -   Heart Rate at rest - Heart Rate post session -   SPO2 at rest 96% RA SPO2 post session -%     Therapeutic Exercises: AROM for BLE while seated and supine. Patient education  Patient educated on role of Physical Therapy, risks of immobility, safety and plan of care,  importance of mobility while in hospital  and hip precautions     Patient response to education:   Pt verbalized understanding Pt demonstrated skill Pt requires further education in this area   yes yes no     ASSESSMENT:    Comments:  Patient was seen this date for PT treatment. in PM. Wanting to D/c home this date RN charge made aware. . Patient was agreeable to intervention. Results of the functional assessment are noted above. Upon entering the room patient was found supine in bed. .Gait completed with fww now reciprocating gait pattern. Stair negotiation completed with minimal cues. Reviewed car transfer again and bed transfer to higher surface. Patient confident about returning home. At end of session, patient in bed with  call light and phone within reach, all lines and tubes intact, nursing notified.  present. This patient can benefit from the continuation of skilled PT  to maximize functional level and return to OF.      Treatment:  Patient practiced and was instructed in the following treatment:    · Bed mobility training - pt given verbal and tactile cues to facilitate proper sequencing and safety during rolling and supine>sit as well as minutes  [] Neuromuscular reeducation 32297 - minutes       Ray[facility], [address] [facility]

## 2021-03-10 NOTE — CARE COORDINATION
Care Coordination :  Patient seen at bedside for discharge planning. Hoping for discharge today. Admitted for elective hip replacement and doing very well post op. PT OT ekaterina complete. Lives with spouse in one floor home in [place] and was independent in all areas. Spouse also retired and in good health and drives. Patient has wheeled  walker and elevated toilet seat at home  . Pharmacy is  [facility] in [place] and PCP is EL Quinones    Requested home health from [facility]. Referral called . Order for [facility] PT in chart. Perfect serve to ortho service for discharge order. Anticipate discharge home when order received. Spouse to transport. The Plan for Transition of Care is related to the following treatment goals:home with home health     The Patient  was provided with a choice of provider and agrees   with the discharge plan. [x] Yes [] No    Freedom of choice list was provided with basic dialogue that supports the patient's individualized plan of care/goals, treatment preferences and shares the quality data associated with the providers.  [x] Yes [] No

## 2021-03-10 NOTE — PLAN OF CARE
Problem: Infection:  Goal: Will remain free from infection  Description: Will remain free from infection  3/10/2021 1609 by Marda Romberg, RN  Outcome: Met This Shift  3/10/2021 0236 by Maria Luisa Grace RN  Outcome: Met This Shift     Problem: Safety:  Goal: Free from accidental physical injury  Description: Free from accidental physical injury  3/10/2021 1609 by Marda Romberg, RN  Outcome: Met This Shift  3/10/2021 0236 by Maria Luisa Grace RN  Outcome: Met This Shift  Goal: Free from intentional harm  Description: Free from intentional harm  3/10/2021 1609 by Marda Romberg, RN  Outcome: Met This Shift  3/10/2021 0236 by Maria Luisa Grace RN  Outcome: Met This Shift     Problem: Daily Care:  Goal: Daily care needs are met  Description: Daily care needs are met  3/10/2021 1609 by Marda Romberg, RN  Outcome: Met This Shift  3/10/2021 0236 by Maria Luisa Grace RN  Outcome: Met This Shift     Problem: Pain:  Goal: Patient's pain/discomfort is manageable  Description: Patient's pain/discomfort is manageable  3/10/2021 1609 by Marda Romberg, RN  Outcome: Met This Shift  3/10/2021 0236 by Maria Luisa Grace RN  Outcome: Met This Shift     Problem: Discharge Planning:  Goal: Patients continuum of care needs are met  Description: Patients continuum of care needs are met  3/10/2021 1609 by Marda Romberg, RN  Outcome: Met This Shift  3/10/2021 0236 by Maria Luisa Grace RN  Outcome: Met This Shift     Problem: Skin Integrity:  Goal: Skin integrity will stabilize  Description: Skin integrity will stabilize  3/10/2021 1609 by Marda Romberg, RN  Outcome: Ongoing  3/10/2021 0236 by Maria Luisa Grace RN  Outcome: Met This Shift

## 2021-03-12 NOTE — DISCHARGE SUMMARY
Pain for up to 7 days. Intended supply: 7 days. Take lowest dose possible to manage pain        CONTINUE taking these medications    diclofenac 75 MG EC tablet  Commonly known as: VOLTAREN  Take 1 tablet by mouth 2 times daily     NALTREXONE HCL PO     sulfamethoxazole-trimethoprim 800-160 MG per tablet  Commonly known as:  Bactrim DS  Take 1 tablet by mouth 2 times daily for 10 days     venlafaxine 37.5 MG tablet  Commonly known as: EFFEXOR        ASK your doctor about these medications    Vitamin D3 25 MCG (1000 UT) Caps           Where to Get Your Medications      You can get these medications from any pharmacy    Bring a paper prescription for each of these medications  · aspirin 325 MG EC tablet  · oxyCODONE-acetaminophen 5-325 MG per tablet             Signed:  Lisha Gray  3/12/2021  8:30 AM

## 2021-03-15 ENCOUNTER — TELEPHONE (OUTPATIENT)
Dept: ORTHOPEDIC SURGERY | Age: 66
End: 2021-03-15

## 2021-03-15 NOTE — TELEPHONE ENCOUNTER
Spoke with patient's  Martinez Patrick regarding NENA's response to taking Diclofenac. Thomas states Timo is taking Diclofenac and  bid. Timo does not feel she needs Diclofenac and feels it may be causing upset stomach. Informed  to D/C Voltaren and continue  bid for total of 4 weeks post-op. Instructed patient to call if any concerns.

## 2021-03-15 NOTE — TELEPHONE ENCOUNTER
3/15/2021 10:25 am Patient phoned office / message left on voice mail: I am feeling real good. Have a problem: Received a letter from [place] which states a partial denial of my hospital stay. 3/15/2021 3:45 pm Follow up phone call / spoke w/ and informed patient: Admit status was changed to Observation status. A 2 night stay is allowed under Observation Status. Informed patient: She is within the allowed LOS. Patient given [facility] billing number if needed in the future for discussion of her bill. Patient also inquiring: When can she resume Diclofenac 75 mg bid? Patient is taking  mg bid - DVT Prophylaxis.

## 2021-03-16 NOTE — TELEPHONE ENCOUNTER
Spoke with patient. Stomach feeling much better since stopping Diclofenac. She will continue with  bid. If upset stomach returns, instructed patient to decrease ASA to 325 qd. FU appt here on Monday 3/22/2021.

## 2021-03-22 ENCOUNTER — OFFICE VISIT (OUTPATIENT)
Dept: ORTHOPEDIC SURGERY | Age: 66
End: 2021-03-22

## 2021-03-22 VITALS — WEIGHT: 168 LBS | HEIGHT: 64 IN | BODY MASS INDEX: 28.68 KG/M2

## 2021-03-22 DIAGNOSIS — Z96.641 S/P HIP REPLACEMENT, RIGHT: Primary | ICD-10-CM

## 2021-03-22 DIAGNOSIS — M16.11 PRIMARY OSTEOARTHRITIS OF RIGHT HIP: ICD-10-CM

## 2021-03-22 PROCEDURE — 99024 POSTOP FOLLOW-UP VISIT: CPT | Performed by: ORTHOPAEDIC SURGERY

## 2021-03-22 NOTE — PROGRESS NOTES
Chief Complaint:   Chief Complaint   Patient presents with    Post-Op Check     Post-op Rt COLLINS 3/9/2021. Doing well. Feels great       HPI 13 days postop right total hip. Patient is thrilled with her pain relief. Requiring no real medications. Feels independent with her exercises after home PT. Patient Active Problem List   Diagnosis    Acute febrile illness    [facility]ogren's disease ([facility] [address]    Anxiety and depression    Primary osteoarthritis of right hip    Status post total hip replacement, right       Past Medical History:   Diagnosis Date    Anxiety     Depression     Sjogren's disease ([facility] [address]        Past Surgical History:   Procedure Laterality Date    CATARACT REMOVAL Left     DILATION AND CURETTAGE OF UTERUS  1984    SHOULDER ARTHROSCOPY Right 04/29/2015    Excision loose bodies, Acromioplasty, Roxana resection distal clavicle  R. Maria Victoria Tiwari MD    TOTAL HIP ARTHROPLASTY Right 3/9/2021    RIGHT HIP TOTAL ARTHROPLASTY--ZAYDA performed by Laila Simon MD at [facility] OR       Current Outpatient Medications   Medication Sig Dispense Refill    aspirin 325 MG EC tablet Take 1 tablet by mouth daily (Patient taking differently: Take 325 mg by mouth 2 times daily ) 30 tablet 3    Cholecalciferol (VITAMIN D3) 25 MCG (1000 UT) CAPS Take 2 capsules by mouth daily       venlafaxine (EFFEXOR) 37.5 MG tablet Take 37.5 mg by mouth daily      NALTREXONE HCL PO Take 3 mg by mouth nightly       diclofenac (VOLTAREN) 75 MG EC tablet Take 1 tablet by mouth 2 times daily 60 tablet 3     No current facility-administered medications for this visit.         Allergies   Allergen Reactions    Bee Venom Swelling     Does not have epi pen    Pcn [Penicillins]        Social History     Socioeconomic History    Marital status:      Spouse name: None    Number of children: None    Years of education: None    Highest education level: None   Occupational History    None   Social Needs    Financial resource strain: None    Food insecurity     Worry: None     Inability: None    Transportation needs     Medical: None     Non-medical: None   Tobacco Use    Smoking status: Never Smoker    Smokeless tobacco: Never Used   Substance and Sexual Activity    Alcohol use: No    Drug use: No    Sexual activity: Yes     Partners: Male   Lifestyle    Physical activity     Days per week: None     Minutes per session: None    Stress: None   Relationships    Social connections     Talks on phone: None     Gets together: None     Attends [employer] service: None     Active member of club or organization: None     Attends meetings of clubs or organizations: None     Relationship status: None    Intimate partner violence     Fear of current or ex partner: None     Emotionally abused: None     Physically abused: None     Forced sexual activity: None   Other Topics Concern    None   Social History Narrative    None       Family History   Problem Relation Age of Onset    Liver Disease Mother     Cancer Father         lung liver cancer major smoker    Cancer Brother         pancreatic cancer         Review of Systems   No fever, chills, or other constitutionalsymptoms. No numbness or other neuro symptoms. No chest pain. No dyspnea. [email or web address] stands independently from a chair walks with minimal Trendelenburg gait full weightbearing. Trace positive Trendelenburg test on single-leg stance. Leg lengths show that she is now about 4 mm longer on the right than the left. There is no pain with full right hip rotation. No significant pain on left hip rotation. Physical Exam    Patient is alert and oriented. Well-developed well-nourished. Pupils equal and reactive. Scleraeanicteric. Neck supple  Lungs clear. Cardiac rate and rhythm regular. Abdomen soft and nontender. Skin warm and dry. XRAY: X-ray today AP pelvis with AP and lateral views right hip.     The left hip again shows stage III degenerative changes with some joint space remaining. Right hip shows total hip arthroplasty with excellent fit and alignment unchanged from postop. Acetabular fixation screws intact. Impression: Intact right total hip arthroplasty good alignment. ASSESSMENT/PLAN:    Fco Mao was seen today for post-op check. Diagnoses and all orders for this visit:    S/P hip replacement, right  -     XR HIP 2-3 VW W PELVIS RIGHT; Future    Primary osteoarthritis of right hip    Findings and images reviewed with the patient and her . Her leg length change is minimal and should not be clinically noticeable once her abductors recover. Staples removed from the right hip. Continue use cane when out of doors. She declines outpatient therapy now states she will do home exercises but will let me know if she wants assistance with it.     Return in about 6 weeks (around 5/3/2021) for exam and xray rt shashank.       Jorje Reid MD    3/22/2021  11:11 AM

## 2021-05-05 ENCOUNTER — OFFICE VISIT (OUTPATIENT)
Dept: ORTHOPEDIC SURGERY | Age: 66
End: 2021-05-05

## 2021-05-05 VITALS — WEIGHT: 168 LBS | BODY MASS INDEX: 28.68 KG/M2 | HEIGHT: 64 IN

## 2021-05-05 DIAGNOSIS — Z96.641 STATUS POST TOTAL HIP REPLACEMENT, RIGHT: ICD-10-CM

## 2021-05-05 DIAGNOSIS — Z96.641 S/P HIP REPLACEMENT, RIGHT: Primary | ICD-10-CM

## 2021-05-05 PROCEDURE — 99024 POSTOP FOLLOW-UP VISIT: CPT | Performed by: ORTHOPAEDIC SURGERY

## 2021-05-05 RX ORDER — IBUPROFEN 200 MG
200 TABLET ORAL EVERY 8 HOURS PRN
COMMUNITY

## 2021-05-05 NOTE — PROGRESS NOTES
Chief Complaint:   Chief Complaint   Patient presents with    Post-Op Check     Eight weeks out right total hip arthroplasty. Doing well states right leg is weak, and having some pain in right buttock. HPI 62-year-old woman 8 weeks after right total hip arthroplasty. Says her pain is doing well however she is concerned because it feels little weak and some buttock discomfort when she tries to step up leading with the right leg. She has been doing a lot of walking but has not been doing the focused hip exercises. Patient Active Problem List   Diagnosis    Acute febrile illness    [facility]ogren's disease ([address]    Anxiety and depression    Primary osteoarthritis of right hip    Status post total hip replacement, right       Past Medical History:   Diagnosis Date    Anxiety     Depression     Sjogren's disease ([facility] [address]        Past Surgical History:   Procedure Laterality Date    CATARACT REMOVAL Left     DILATION AND CURETTAGE OF UTERUS  1984    SHOULDER ARTHROSCOPY Right 04/29/2015    Excision loose bodies, Acromioplasty, Roxana resection distal clavicle  R. Devaughn Zamudio MD    TOTAL HIP ARTHROPLASTY Right 3/9/2021    RIGHT HIP TOTAL ARTHROPLASTY--ZAYDA performed by Radha Beckett MD at [facility] OR       Current Outpatient Medications   Medication Sig Dispense Refill    ibuprofen (ADVIL;MOTRIN) 200 MG tablet Take 200 mg by mouth every 8 hours as needed for Pain      Cholecalciferol (VITAMIN D3) 25 MCG (1000 UT) CAPS Take 2 capsules by mouth daily       venlafaxine (EFFEXOR) 37.5 MG tablet Take 37.5 mg by mouth daily      NALTREXONE HCL PO Take 3 mg by mouth nightly        No current facility-administered medications for this visit.         Allergies   Allergen Reactions    Bee Venom Swelling     Does not have epi pen    Pcn [Penicillins]        Social History     Socioeconomic History    Marital status:      Spouse name: None    Number of children: None    Years of education: None    Highest education level: None   Occupational History    None   Social Needs    Financial resource strain: None    Food insecurity     Worry: None     Inability: None    Transportation needs     Medical: None     Non-medical: None   Tobacco Use    Smoking status: Never Smoker    Smokeless tobacco: Never Used   Substance and Sexual Activity    Alcohol use: No    Drug use: No    Sexual activity: Yes     Partners: Male   Lifestyle    Physical activity     Days per week: None     Minutes per session: None    Stress: None   Relationships    Social connections     Talks on phone: None     Gets together: None     Attends Muslim service: None     Active member of club or organization: None     Attends meetings of clubs or organizations: None     Relationship status: None    Intimate partner violence     Fear of current or ex partner: None     Emotionally abused: None     Physically abused: None     Forced sexual activity: None   Other Topics Concern    None   Social History Narrative    None       Family History   Problem Relation Age of Onset    Liver Disease Mother     Cancer Father         lung liver cancer major smoker    Cancer Brother         pancreatic cancer         Review of Systems   No fever, chills, or other constitutionalsymptoms. No numbness or other neuro symptoms. No chest pain. No dyspnea. [unfilled]   Patient is able to stand briskly independently from a chair and walk without a limp. Negative Trendelenburg test.  Leg lengths clinically equal.  No pain on hip range of motion either side. No pain with resisted hip flexion or abduction with grossly intact strength in all dimensions. Physical Exam    Patient is alert and oriented. Well-developed well-nourished. Pupils equal and reactive. Scleraeanicteric. Neck supple  Lungs clear. Cardiac rate and rhythm regular. Abdomen soft and nontender. Skin warm and dry.        XRAY: X-ray today AP pelvis with AP and lateral views right hip. Right total hip arthroplasty in place without subsidence lucency position change or other complicating feature. Radiographically moderate degenerative changes are noted in the left hip. Impression: Intact right total hip arthroplasty in good alignment. ASSESSMENT/PLAN:    Alyssa Her was seen today for post-op check. Diagnoses and all orders for this visit:    S/P hip replacement, right  -     XR HIP 2-3 VW W PELVIS RIGHT; Future    Status post total hip replacement, right  -     XR HIP 2-3 VW W PELVIS RIGHT; Future    Overall the patient appears to be doing exceptionally well. Her residual stiffness and slight discomfort are typical and expected to continue to improve for up to 1 year. Reviewed the importance of doing her focused hip exercise program as instructed in PT. Return in about 3 months (around 8/5/2021) for exam and xray rt COLLINS.        Camille Holm MD    5/5/2021  2:37 PM

## 2021-08-05 ENCOUNTER — OFFICE VISIT (OUTPATIENT)
Dept: ORTHOPEDIC SURGERY | Age: 66
End: 2021-08-05
Payer: COMMERCIAL

## 2021-08-05 VITALS — WEIGHT: 157 LBS | BODY MASS INDEX: 26.8 KG/M2 | HEIGHT: 64 IN

## 2021-08-05 DIAGNOSIS — Z96.641 STATUS POST TOTAL HIP REPLACEMENT, RIGHT: Primary | ICD-10-CM

## 2021-08-05 PROCEDURE — 99213 OFFICE O/P EST LOW 20 MIN: CPT | Performed by: ORTHOPAEDIC SURGERY

## 2021-08-05 NOTE — PROGRESS NOTES
right hip. Right total hip arthroplasty in good unchanged alignment without subsidence lucency or other complicating feature. Moderate degenerative changes are again noted in the left hip. Impression: Intact right total hip arthroplasty in good alignment. ASSESSMENT/PLAN:    [facility] HANY was seen today for follow up after procedure. Diagnoses and all orders for this visit:    Status post total hip replacement, right  -     XR HIP 2-3 VW W PELVIS RIGHT; Future    Findings and images reviewed with the patient. Doing well expect further progress for up to 1 year postop. Left hip not currently symptomatic. Return in about 6 months (around 2/5/2022) for exam and xray rt COLLINS.        Tristan Lorenz MD    8/5/2021  11:03 AM

## 2021-09-20 ENCOUNTER — OFFICE VISIT (OUTPATIENT)
Dept: RHEUMATOLOGY | Age: 66
End: 2021-09-20
Payer: COMMERCIAL

## 2021-09-20 ENCOUNTER — HOSPITAL ENCOUNTER (OUTPATIENT)
Age: 66
Discharge: HOME | End: 2021-09-20
Payer: COMMERCIAL

## 2021-09-20 VITALS
BODY MASS INDEX: 26.8 KG/M2 | SYSTOLIC BLOOD PRESSURE: 122 MMHG | DIASTOLIC BLOOD PRESSURE: 76 MMHG | HEART RATE: 65 BPM | OXYGEN SATURATION: 99 % | HEIGHT: 64 IN | WEIGHT: 157 LBS

## 2021-09-20 DIAGNOSIS — M35.01 SJOGREN'S SYNDROME WITH KERATOCONJUNCTIVITIS SICCA (HCC): ICD-10-CM

## 2021-09-20 DIAGNOSIS — R76.8 ELEVATED ANTINUCLEAR ANTIBODY (ANA) LEVEL: ICD-10-CM

## 2021-09-20 DIAGNOSIS — R76.8 ELEVATED ANTINUCLEAR ANTIBODY (ANA) LEVEL: Primary | ICD-10-CM

## 2021-09-20 LAB
ALBUMIN SERPL-MCNC: 4 G/DL (ref 3.5–5.2)
ALP BLD-CCNC: 100 U/L (ref 35–104)
ALT SERPL-CCNC: 11 U/L (ref 0–32)
ANION GAP SERPL CALCULATED.3IONS-SCNC: 9 MMOL/L (ref 7–16)
AST SERPL-CCNC: 20 U/L (ref 0–31)
BACTERIA: ABNORMAL /HPF
BASOPHILS ABSOLUTE: 0.01 E9/L (ref 0–0.2)
BASOPHILS RELATIVE PERCENT: 0.2 % (ref 0–2)
BILIRUB SERPL-MCNC: 0.3 MG/DL (ref 0–1.2)
BILIRUBIN URINE: NEGATIVE
BLOOD, URINE: ABNORMAL
BUN BLDV-MCNC: 12 MG/DL (ref 6–23)
CALCIUM SERPL-MCNC: 9.2 MG/DL (ref 8.6–10.2)
CHLORIDE BLD-SCNC: 103 MMOL/L (ref 98–107)
CLARITY: ABNORMAL
CO2: 26 MMOL/L (ref 22–29)
COLOR: YELLOW
CREAT SERPL-MCNC: 0.7 MG/DL (ref 0.5–1)
EOSINOPHILS ABSOLUTE: 0.02 E9/L (ref 0.05–0.5)
EOSINOPHILS RELATIVE PERCENT: 0.5 % (ref 0–6)
GFR AFRICAN AMERICAN: >60
GFR NON-AFRICAN AMERICAN: >60 ML/MIN/1.73
GLUCOSE BLD-MCNC: 87 MG/DL (ref 74–99)
GLUCOSE URINE: NEGATIVE MG/DL
HCT VFR BLD CALC: 40.3 % (ref 34–48)
HEMOGLOBIN: 12.6 G/DL (ref 11.5–15.5)
IMMATURE GRANULOCYTES #: 0.01 E9/L
IMMATURE GRANULOCYTES %: 0.2 % (ref 0–5)
KETONES, URINE: NEGATIVE MG/DL
LEUKOCYTE ESTERASE, URINE: ABNORMAL
LYMPHOCYTES ABSOLUTE: 0.99 E9/L (ref 1.5–4)
LYMPHOCYTES RELATIVE PERCENT: 24.6 % (ref 20–42)
MCH RBC QN AUTO: 28.4 PG (ref 26–35)
MCHC RBC AUTO-ENTMCNC: 31.3 % (ref 32–34.5)
MCV RBC AUTO: 91 FL (ref 80–99.9)
MONOCYTES ABSOLUTE: 0.37 E9/L (ref 0.1–0.95)
MONOCYTES RELATIVE PERCENT: 9.2 % (ref 2–12)
NEUTROPHILS ABSOLUTE: 2.62 E9/L (ref 1.8–7.3)
NEUTROPHILS RELATIVE PERCENT: 65.3 % (ref 43–80)
NITRITE, URINE: POSITIVE
PDW BLD-RTO: 15.7 FL (ref 11.5–15)
PH UA: 6 (ref 5–9)
PLATELET # BLD: 235 E9/L (ref 130–450)
PMV BLD AUTO: 11.5 FL (ref 7–12)
POTASSIUM SERPL-SCNC: 4 MMOL/L (ref 3.5–5)
PROTEIN UA: NEGATIVE MG/DL
RBC # BLD: 4.43 E12/L (ref 3.5–5.5)
RBC UA: ABNORMAL /HPF (ref 0–2)
SEDIMENTATION RATE, ERYTHROCYTE: 44 MM/HR (ref 0–20)
SODIUM BLD-SCNC: 138 MMOL/L (ref 132–146)
SPECIFIC GRAVITY UA: >=1.03 (ref 1–1.03)
UROBILINOGEN, URINE: 0.2 E.U./DL
WBC # BLD: 4 E9/L (ref 4.5–11.5)
WBC UA: ABNORMAL /HPF (ref 0–5)

## 2021-09-20 PROCEDURE — 99204 OFFICE O/P NEW MOD 45 MIN: CPT | Performed by: INTERNAL MEDICINE

## 2021-09-20 PROCEDURE — 86235 NUCLEAR ANTIGEN ANTIBODY: CPT

## 2021-09-20 PROCEDURE — 86160 COMPLEMENT ANTIGEN: CPT

## 2021-09-20 PROCEDURE — 86140 C-REACTIVE PROTEIN: CPT

## 2021-09-20 PROCEDURE — 80053 COMPREHEN METABOLIC PANEL: CPT

## 2021-09-20 PROCEDURE — 85651 RBC SED RATE NONAUTOMATED: CPT

## 2021-09-20 PROCEDURE — 81001 URINALYSIS AUTO W/SCOPE: CPT

## 2021-09-20 PROCEDURE — 86334 IMMUNOFIX E-PHORESIS SERUM: CPT

## 2021-09-20 PROCEDURE — 84165 PROTEIN E-PHORESIS SERUM: CPT

## 2021-09-20 PROCEDURE — 86225 DNA ANTIBODY NATIVE: CPT

## 2021-09-20 PROCEDURE — 86200 CCP ANTIBODY: CPT

## 2021-09-20 PROCEDURE — 86431 RHEUMATOID FACTOR QUANT: CPT

## 2021-09-20 PROCEDURE — 36415 COLL VENOUS BLD VENIPUNCTURE: CPT

## 2021-09-20 PROCEDURE — 85025 COMPLETE CBC W/AUTO DIFF WBC: CPT

## 2021-09-20 ASSESSMENT — ENCOUNTER SYMPTOMS
COUGH: 0
ABDOMINAL PAIN: 0
COLOR CHANGE: 0
VOMITING: 0
SHORTNESS OF BREATH: 0
TROUBLE SWALLOWING: 0
DIARRHEA: 0
NAUSEA: 0

## 2021-09-20 NOTE — PROGRESS NOTES
Miriam Salgado 1955 is a 61 y.o. female, here for evaluation of the following chief complaint(s):  New Patient (Patient here as a new patient for positive BE. )         ASSESSMENT/PLAN:    Miriam Salgado 1955 is a 61 y.o. female seen in consult for positive BE. 1.  Positive BE-she has a titer of 1: 5120 which is a significantly elevated titer. She also has positive SSA and SSB suggestive of potential Sjogren's syndrome. The question is there is something more going on. We will need to get further work-up. 2.  Sjogren's syndrome-either way she has some Sjogren's syndrome with positive Sjogren's antibodies and sicca symptoms. Her current oral complaints may or may not be related, but she did not have any response to prednisone and other than poor salivary pooling there is nothing striking on exam.  I will give her a referral for ENT. 1. Elevated antinuclear antibody (BE) level  -     C3 Complement; Future  -     C4 Complement; Future  -     CBC Auto Differential; Future  -     Comprehensive Metabolic Panel; Future  -     Sjogren's Ab (SS-A, SS-B); Future  -     ANTI-RNP (GERALD AB); Future  -     Swan (GERALD) Antibody IgG; Future  -     Anti-DNA Antibody, Double-Stranded; Future  -     Urinalysis; Future  -     Rheumatoid Factor; Future  -     Sedimentation Rate; Future  -     Cyclic Citrul Peptide Antibody, IgG; Future  -     C-Reactive Protein; Future  -     Anti-Neutrophilic Cytoplasmic Antibody; Future  -     Immunofixation serum profile; Future  -     Electrophoresis Protein, Serum without Reflex to Immunofixation; Future  -     Tata Presley, DO, Otolaryngology, [place]  2. Sjogren's syndrome with keratoconjunctivitis sicca (HCC)  -     C3 Complement; Future  -     C4 Complement; Future  -     CBC Auto Differential; Future  -     Comprehensive Metabolic Panel; Future  -     Sjogren's Ab (SS-A, SS-B); Future  -     ANTI-RNP (GERALD AB);  Future  -     Swan (GERALD) Antibody IgG; Future  -     Anti-DNA Antibody, Double-Stranded; Future  -     Urinalysis; Future  -     Rheumatoid Factor; Future  -     Sedimentation Rate; Future  -     Cyclic Citrul Peptide Antibody, IgG; Future  -     C-Reactive Protein; Future  -     Anti-Neutrophilic Cytoplasmic Antibody; Future  -     Immunofixation serum profile; Future  -     Electrophoresis Protein, Serum without Reflex to Immunofixation; Future  -     Adeliawinter Bernal., DO, Otolaryngology, [place]      Return in about 6 months (around 3/20/2022). Subjective   SUBJECTIVE/OBJECTIVE:    HPI: Shae Edmondson 1955 is a 61 y.o. female seen in consult for positive BE of 1: 5120. Patient states that for about the last month she has felt like there is a vein that runs across the inside of her buccal mucosa and lower lip that is inflamed and painful. The tip of her tongue has also been very sensitive for the last month. Was part of work-up for that her PCP did an BE which was positive at 1: 5120 as well as positive SSA and SSB. Review of records shows that she actually saw a rheumatology once back in 2011 at the [facility] clinic and was diagnosed with Sjogren's syndrome. There was some talk of a trial of Plaquenil but the patient cannot recall being on that. She does see ophthalmology and takes naltrexone for dry eyes which does seem to help. She states that more recently she also had a rash on the right flank left neck and right leg. She states that it resolved with athlete's foot cream.  Her PCP did also give her a prednisone taper for her oral issues but that did not really help. Otherwise she states that she feels fatigued and having some urinary pressure. Denies fever, rash, oral ulcers, nasal ulcers, alopecia, lymphadenopathy, chest pain, shortness of breath, Raynaud's, dysuria, hematuria, history of DVT.     Past Medical History:   Diagnosis Date    Anxiety     Depression     Sjogren's disease ([facility] [address] Review of Systems   Constitutional: Negative for fatigue and fever. HENT: Negative for mouth sores and trouble swallowing. Respiratory: Negative for cough and shortness of breath. Cardiovascular: Negative for chest pain. Gastrointestinal: Negative for abdominal pain, diarrhea, nausea and vomiting. Genitourinary: Negative for dysuria and hematuria. Musculoskeletal: Negative for arthralgias and joint swelling. Skin: Negative for color change and rash. Neurological: Negative for weakness and numbness. Hematological: Negative for adenopathy. All other systems reviewed and are negative. Objective   Vitals:    09/20/21 1258   BP: 122/76   Pulse: 65   SpO2: 99%      Physical Exam  Constitutional:       General: She is not in acute distress. Appearance: Normal appearance. HENT:      Head: Normocephalic and atraumatic. Right Ear: External ear normal.      Left Ear: External ear normal.      Nose: Nose normal.      Mouth/Throat:      Comments: She has poor salivary pooling but no obviously swollen salivary glands and nothing striking on exam of buccal mucosa. Eyes:      General: No scleral icterus. Pulmonary:      Effort: Pulmonary effort is normal.   Musculoskeletal:         General: No swelling, tenderness or deformity. Skin:     General: Skin is warm and dry. Findings: No rash. Neurological:      General: No focal deficit present. Mental Status: She is alert and oriented to person, place, and time. Mental status is at baseline.    Psychiatric:         Mood and Affect: Mood normal.         Behavior: Behavior normal.            Lab Results   Component Value Date    WBC 8.1 03/10/2021    HGB 9.0 (L) 03/10/2021    HCT 28.0 (L) 03/10/2021    MCV 92.4 03/10/2021     03/10/2021     Lab Results   Component Value Date     03/10/2021    K 4.0 03/10/2021     03/10/2021    CO2 25 03/10/2021    BUN 13 03/10/2021    CREATININE 0.7 03/10/2021    GLUCOSE 112 (H) 03/10/2021    CALCIUM 8.2 (L) 03/10/2021    PROT 9.4 (H) 03/02/2021    LABALBU 4.0 03/02/2021    BILITOT 0.3 03/02/2021    ALKPHOS 89 03/02/2021    AST 24 03/02/2021    ALT 22 03/02/2021    LABGLOM >60 03/10/2021    GFRAA >60 03/10/2021     No results found for: BE  No results found for: RHEUMFACTOR  Lab Results   Component Value Date    SEDRATE 30 (H) 12/17/2012     Lab Results   Component Value Date    CRP 5.6 (H) 12/17/2012            An electronic signature was used to authenticate this note. This note was generated with a voice recognition dictation system. Please disregard any errors or omission which have escaped my review.     --Gerardo Schultz, DO

## 2021-09-21 LAB
ANTI DNA DOUBLE STRANDED: NEGATIVE
C-REACTIVE PROTEIN: 0.5 MG/DL (ref 0–0.4)
C3 COMPLEMENT: 139 MG/DL (ref 90–180)
C4 COMPLEMENT: 25 MG/DL (ref 10–40)
ENA TO RNP ANTIBODY: NEGATIVE
ENA TO SMITH (SM) ANTIBODY: NEGATIVE
ENA TO SSA (RO) ANTIBODY: POSITIVE
ENA TO SSB (LA) ANTIBODY: POSITIVE
RHEUMATOID FACTOR: 32 IU/ML (ref 0–13)

## 2021-09-22 LAB
ALBUMIN SERPL-MCNC: 3.3 G/DL (ref 3.5–4.7)
ALPHA-1-GLOBULIN: 0.3 G/DL (ref 0.2–0.4)
ALPHA-2-GLOBULIN: 0.8 G/FL (ref 0.5–1)
BETA GLOBULIN: 1.2 G/DL (ref 0.8–1.3)
ELECTROPHORESIS: ABNORMAL
GAMMA GLOBULIN: 2.7 G/DL (ref 0.7–1.6)
IMMUNOFIXATION RESULT, SERUM: NORMAL
TOTAL PROTEIN: 8.4 G/DL (ref 6.4–8.3)

## 2021-09-24 LAB — CYCLIC CITRULLINATED PEPTIDE ANTIBODY IGG: 3.6 U/ML (ref 0–7)

## 2021-10-01 ENCOUNTER — OFFICE VISIT (OUTPATIENT)
Dept: ENT CLINIC | Age: 66
End: 2021-10-01
Payer: COMMERCIAL

## 2021-10-01 VITALS
SYSTOLIC BLOOD PRESSURE: 122 MMHG | BODY MASS INDEX: 27.31 KG/M2 | HEIGHT: 64 IN | DIASTOLIC BLOOD PRESSURE: 62 MMHG | OXYGEN SATURATION: 99 % | TEMPERATURE: 97.4 F | WEIGHT: 160 LBS | HEART RATE: 74 BPM

## 2021-10-01 DIAGNOSIS — M27.0 TORUS MANDIBULARIS: ICD-10-CM

## 2021-10-01 DIAGNOSIS — M35.00 SJOGREN'S SYNDROME, WITH UNSPECIFIED ORGAN INVOLVEMENT (HCC): Primary | ICD-10-CM

## 2021-10-01 DIAGNOSIS — K13.70 LESION OF BUCCAL MUCOSA: ICD-10-CM

## 2021-10-01 PROCEDURE — 1036F TOBACCO NON-USER: CPT | Performed by: OTOLARYNGOLOGY

## 2021-10-01 PROCEDURE — G8427 DOCREV CUR MEDS BY ELIG CLIN: HCPCS | Performed by: OTOLARYNGOLOGY

## 2021-10-01 PROCEDURE — G8419 CALC BMI OUT NRM PARAM NOF/U: HCPCS | Performed by: OTOLARYNGOLOGY

## 2021-10-01 PROCEDURE — 99204 OFFICE O/P NEW MOD 45 MIN: CPT | Performed by: OTOLARYNGOLOGY

## 2021-10-01 PROCEDURE — 3017F COLORECTAL CA SCREEN DOC REV: CPT | Performed by: OTOLARYNGOLOGY

## 2021-10-01 PROCEDURE — G8484 FLU IMMUNIZE NO ADMIN: HCPCS | Performed by: OTOLARYNGOLOGY

## 2021-10-01 RX ORDER — ESTRADIOL 10 UG/1
INSERT VAGINAL
COMMUNITY
Start: 2021-09-23

## 2021-10-01 RX ORDER — NITROFURANTOIN MACROCRYSTALS 100 MG/1
CAPSULE ORAL
COMMUNITY
Start: 2021-09-22 | End: 2022-06-09

## 2021-10-01 ASSESSMENT — ENCOUNTER SYMPTOMS
VOICE CHANGE: 0
SHORTNESS OF BREATH: 0
TROUBLE SWALLOWING: 1

## 2021-10-01 NOTE — PROGRESS NOTES
Subjective:      Patient ID:  Fuentes Swift is a 61 y.o. female. HPI:    Patient presents today for complaint of oral soreness and tongue swelling. Patient has Sjogren's disease and her rheumatologist wanted her to be evaluated by ENT. She is not currently on any medication for this. Patient endorses swelling and pain in the \"vein\" along her cheeks that has been fluctuating for the past 1-2 months. Denies bleeding or drainage from the area. Patient does have dysphagia due to the dryness. She takes naltrexone for her eye dryness. Denies history of salivary gland infections or stones. Patient's medications, allergies, past medical, surgical, social and family histories were reviewed and updated as appropriate. Review of Systems   Constitutional: Negative for chills and fever. HENT: Positive for congestion and trouble swallowing. Negative for mouth sores and voice change. Oral soreness   Eyes:        Dryness   Respiratory: Negative for shortness of breath. All other systems reviewed and are negative. Objective:   Physical Exam  Constitutional:       General: She is not in acute distress. Appearance: Normal appearance. HENT:      Head: Normocephalic and atraumatic. Right Ear: Tympanic membrane and ear canal normal.      Left Ear: Tympanic membrane and ear canal normal.      Nose: Nose normal.      Mouth/Throat:      Mouth: Mucous membranes are moist.      Pharynx: No oropharyngeal exudate or posterior oropharyngeal erythema. Comments: Ridge of redundant mucosa along bilateral buccal mucosa    No evidence of leukoplakia or inflammation    Bilateral mandibular micheal  Eyes:      Extraocular Movements: Extraocular movements intact. Pupils: Pupils are equal, round, and reactive to light. Cardiovascular:      Rate and Rhythm: Normal rate. Pulmonary:      Effort: Pulmonary effort is normal.   Musculoskeletal:         General: Normal range of motion.

## 2021-10-08 ENCOUNTER — TELEPHONE (OUTPATIENT)
Dept: ORTHOPEDIC SURGERY | Age: 66
End: 2021-10-08

## 2021-10-08 NOTE — TELEPHONE ENCOUNTER
10/08/2021 12:16 pm Patient phoned the office / Message left on voice mail: Had surgery [facility]ISS 3/09/2021). Requesting a return call regarding some pain she has been having. 10/08/2021 3:00 pm Follow up phone call / Krish Arredondo w/ patient who reports: Right hip pain when walking. Onset: approximately 1 week ago. Location of pain: Lateral aspect right hip near incision, radiating to right buttock. Patient reports: Visited daughter in [place] recently, climbing steps quite a bit, patient seems to think this discomfort is muscular in nature. Patient has been resting and took Tylenol - seems to help. Instructions to patient: Continue to rest, may use a heating pad, use care not to burn skin. May take 1000 mg Tylenol every 8 hours, not to exceed 3000 mg / 24 hrs. Call on Monday w/ report on condition. Patient expresses understanding and is in agreement w/ the plan. Informed patient: Will send message to the doctor.

## 2021-10-12 ENCOUNTER — TELEPHONE (OUTPATIENT)
Dept: ORTHOPEDIC SURGERY | Age: 66
End: 2021-10-12

## 2021-10-12 NOTE — TELEPHONE ENCOUNTER
10/12/2021 Late Entry     10/11/2021 3:42 pm Patient phoned the office / Message left on voice mail: I spoke w/ you on Friday about my hip pain. I was told to call on Monday. I am afraid I screwed something up (hx TRH 3/09/21). I want to see the doctor. 10/12/2021 1:50 am Follow up phone call [phone] / Timothy Helton w/ the patient who expressed fear that she may have loosened a screw in her hip w/ stair climbing. Patient reports she is able to walk independently w/out an assistive device but by the end of the day her right hip is really hurting.     Appointment made for Monday, October 18 th at 11:00 am

## 2021-10-18 ENCOUNTER — OFFICE VISIT (OUTPATIENT)
Dept: ORTHOPEDIC SURGERY | Age: 66
End: 2021-10-18
Payer: COMMERCIAL

## 2021-10-18 VITALS — HEIGHT: 64 IN | BODY MASS INDEX: 26.8 KG/M2 | WEIGHT: 157 LBS

## 2021-10-18 DIAGNOSIS — Z96.641 STATUS POST TOTAL HIP REPLACEMENT, RIGHT: Primary | ICD-10-CM

## 2021-10-18 PROCEDURE — 99213 OFFICE O/P EST LOW 20 MIN: CPT | Performed by: ORTHOPAEDIC SURGERY

## 2021-10-18 PROCEDURE — 1036F TOBACCO NON-USER: CPT | Performed by: ORTHOPAEDIC SURGERY

## 2021-10-18 PROCEDURE — G8419 CALC BMI OUT NRM PARAM NOF/U: HCPCS | Performed by: ORTHOPAEDIC SURGERY

## 2021-10-18 PROCEDURE — G8428 CUR MEDS NOT DOCUMENT: HCPCS | Performed by: ORTHOPAEDIC SURGERY

## 2021-10-18 PROCEDURE — G8484 FLU IMMUNIZE NO ADMIN: HCPCS | Performed by: ORTHOPAEDIC SURGERY

## 2021-10-18 PROCEDURE — 3017F COLORECTAL CA SCREEN DOC REV: CPT | Performed by: ORTHOPAEDIC SURGERY

## 2021-10-18 RX ORDER — CIPROFLOXACIN 500 MG/1
TABLET, FILM COATED ORAL
COMMUNITY
Start: 2021-10-15 | End: 2022-06-09

## 2021-10-18 NOTE — PROGRESS NOTES
Chief Complaint:   Chief Complaint   Patient presents with    Hip Pain     Burning pain right hip, over incision site. Rt COLLINS 3/9/2021. Also has pain just below right buttock that on occasion radiates down back of right leg. Being treated for chronic UTI x 6 months. (Several antibiotics) No recall of injury. HPI 61years old, right total hip arthroplasty March 2021. She is here concerned about some persistent discomfort lateral aspect right hip. She states no specific recent injury event although she did call earlier to states she had been doing a lot of stair climbing at her daughter's in [place] and was \"afraid she knocked something loose\". Of note after her arthroplasty she only did home therapy never did any formal outpatient therapy for hip abductor strengthening. Patient Active Problem List   Diagnosis    Acute febrile illness    [facility]ogren's disease ([facility] [address]    Anxiety and depression    Primary osteoarthritis of right hip    Status post total hip replacement, right       Past Medical History:   Diagnosis Date    Anxiety     Depression     Sjogren's disease ([facility] [address]        Past Surgical History:   Procedure Laterality Date    CATARACT REMOVAL Left     DILATION AND CURETTAGE OF UTERUS  1984    SHOULDER ARTHROSCOPY Right 04/29/2015    Excision loose bodies, Acromioplasty, Roxana resection distal clavicle  R.  Adeline Canales MD    TOTAL HIP ARTHROPLASTY Right 3/9/2021    RIGHT HIP TOTAL ARTHROPLASTY--ZAYDA performed by Toby Schmidt MD at [facility] OR       Current Outpatient Medications   Medication Sig Dispense Refill    ciprofloxacin (CIPRO) 500 MG tablet TAKE 1 TABLET BY MOUTH TWICE DAILY      Estradiol (VAGIFEM) 10 MCG TABS vaginal tablet Insert 1 tablet twice a week      ibuprofen (ADVIL;MOTRIN) 200 MG tablet Take 200 mg by mouth every 8 hours as needed for Pain      Cholecalciferol (VITAMIN D3) 25 MCG (1000 UT) CAPS Take 2 capsules by mouth daily       venlafaxine (EFFEXOR) 37.5 MG tablet Take 37.5 mg by mouth daily      NALTREXONE HCL PO Take 3 mg by mouth nightly       nitrofurantoin (MACRODANTIN) 100 MG capsule TAKE 1 CAPSULE BY MOUTH TWICE DAILY (Patient not taking: Reported on 10/18/2021)      BIOTIN PO Take 1 tablet by mouth daily  (Patient not taking: Reported on 10/18/2021)       No current facility-administered medications for this visit. Allergies   Allergen Reactions    Bee Venom Swelling     Does not have epi pen    Pcn [Penicillins]        Social History     Socioeconomic History    Marital status:      Spouse name: None    Number of children: None    Years of education: None    Highest education level: None   Occupational History    None   Tobacco Use    Smoking status: Never Smoker    Smokeless tobacco: Never Used   Substance and Sexual Activity    Alcohol use: No    Drug use: No    Sexual activity: Yes     Partners: Male   Other Topics Concern    None   Social History Narrative    None     Social Determinants of Health     Financial Resource Strain:     Difficulty of Paying Living Expenses:    Food Insecurity:     Worried About Running Out of Food in the Last Year:     Ran Out of Food in the Last Year:    Transportation Needs:     Lack of Transportation (Medical):      Lack of Transportation (Non-Medical):    Physical Activity:     Days of Exercise per Week:     Minutes of Exercise per Session:    Stress:     Feeling of Stress :    Social Connections:     Frequency of Communication with Friends and Family:     Frequency of Social Gatherings with Friends and Family:     Attends [employer] Services:     Active Member of Clubs or Organizations:     Attends Club or Organization Meetings:     Marital Status:    Intimate Partner Violence:     Fear of Current or Ex-Partner:     Emotionally Abused:     Physically Abused:     Sexually Abused:        Family History   Problem Relation Age of Onset    Liver Disease Mother    Komal Goodness Father lung liver cancer major smoker    Cancer Brother         pancreatic cancer         Review of Systems   No fever, chills, or other constitutionalsymptoms. No numbness or other neuro symptoms. No chest pain. No dyspnea. [unfilled]   No acute distress. Stands and walks independently without assistive device but there is a slight Trendelenburg gait on the right. Single-leg stance demonstrates a 1+ Trendelenburg lean on right leg stance. Incision itself appears benign but is tender to palpation over the greater trochanter which is easily palpable subcutaneously. There is no pain produced on full right hip rotation. Physical Exam    Patient is alert and oriented. Well-developed well-nourished. Pupils equal and reactive. Scleraeanicteric. Neck supple  Lungs clear. Cardiac rate and rhythm regular. Abdomen soft and nontender. Skin warm and dry. XRAY: X-ray today AP pelvis with AP and lateral view right hip. Right total hip arthroplasty cementless components with excellent fit and alignment unchanged from prior. No bone lesions no lucencies subsidence or loss of position of the acetabular component or fixation screw. Impression: Intact right total hip arthroplasty in unchanged good alignment. ASSESSMENT/PLAN:    Bibiana Juárez was seen today for hip pain. Diagnoses and all orders for this visit:    Status post total hip replacement, right  -     XR HIP 2-3 VW W PELVIS RIGHT; Future  -     Ambulatory referral to Physical Therapy    Complaints and exam consistent with muscular pain abductor and gluteal.  The lack of joint rotation pain does not suggest that she has any evidence of sepsis in the right hip although her recurrent UTIs are noted. She is willing to see PT for a visit or 2 to formally instruct and assist her with hip abductor and gluteal exercises. Return for As scheduled January.        Ric Rincon MD    10/18/2021  1:12 PM

## 2021-10-19 ENCOUNTER — EVALUATION (OUTPATIENT)
Dept: PHYSICAL THERAPY | Age: 66
End: 2021-10-19
Payer: COMMERCIAL

## 2021-10-19 DIAGNOSIS — Z96.641 STATUS POST TOTAL REPLACEMENT OF RIGHT HIP: Primary | ICD-10-CM

## 2021-10-19 PROCEDURE — 97161 PT EVAL LOW COMPLEX 20 MIN: CPT | Performed by: PHYSICAL THERAPIST

## 2021-10-19 PROCEDURE — 97112 NEUROMUSCULAR REEDUCATION: CPT | Performed by: PHYSICAL THERAPIST

## 2021-10-20 NOTE — PROGRESS NOTES
[facility]   Phone: [phone]   Fax: [phone]      Physical Therapy Daily Treatment Note    Date: 10/19/2021  Patient Name: Radha Zheng  : 1955   MRN: [id number]  DOInjury: 3/9/2021  DOSx: 3/9/2021  Referring Provider: Catherine Silva MD  [address] [place] [address]     Medical Diagnosis:      Date of Procedure: 3/9/2021     Pre-Op Diagnosis: OSTEOARTHRITIS     Post-Op Diagnosis: Same       Procedure(s):  RIGHT HIP TOTAL ARTHROPLASTY--ZAYDA     Surgeon(s):  Catherine Silva MD    Outcome Measure: LEFS 35% Disability    S: pt c/o 2-3/10 lateral right hip pain & buttock pain. O: Please refer to PT [facility] 10/19/2021  Time 8889-7307     Visit 1 Repeat outcome measure at mid point and end. Pain      Strength      Palpation      ROM      Modalities            Manual            Stretch      IT band supine      HS supine      Quad Prone      Exercise      Bike      glute bridge x10 10s hold  NR   glute bridge with hip add x10 10s hold Ball squeeze NR   glute bridge with hip abd x10 10s hold Yellow band NR   Side-lying clamshell x10 10s hold Yellow band NR   Prone off table hip ext x10 10s hold  NR   Side-lying hip abd x10 10s hold  NR   Prone hip ext x10 10s hold  NR   TG calf raises      Step-ups - FWD      Step-ups - LAT      Step-ups - BWD        NMR To improve balance for safe community and home ambulation    Resisted walk      FWD      BKWD      lat      March      Side stepping      Retro walk      Heel to toe      A:  Tolerated well. Above added to written HEP.     P: Continue with rehab plan    Erika Casillas, PT [address]    Treatment Charges: Mins Units   Initial Evaluation 20 1   Re-Evaluation     Ther Exercise         TE     Manual Therapy     MT     Ther Activities        TA     Gait Training          GT     Neuro Re-education NR 25 2   Modalities     Non-Billable Service Time     Other     Total Time/Units 45 3

## 2021-10-20 NOTE — PROGRESS NOTES
[address] [facility]   Phone: [phone]   Fax: [phone]         Date:  10/19/2021   Patient: Susie Augustine  : 1955  MRN: [id number]  Referring Provider: Indigo Angelo MD  [address] [place] [address]     Medical Diagnosis:     B04.761 (ICD-10-CM) - Status post total hip replacement, right     Date of Procedure: 3/9/2021     Pre-Op Diagnosis: OSTEOARTHRITIS     Post-Op Diagnosis: Same       Procedure(s):  RIGHT HIP TOTAL ARTHROPLASTY--ZAYDA     Surgeon(s):  Indigo Angelo MD      SUBJECTIVE:     Onset date: 3/9/2021    Onset: Insidious onset    Mechanism of Injury: The pt underwent RLE COLLINS on 3/9/2021. Pt reported that she completed Home PT following RLE COLLINS. Pt did not attend outpt PT following RLE COLLINS. Previous PT: Home PT following Home PT     Medical Management for Current Problem: RLE COLLINS 3/9/2021    Chief complaint: pain, weakness    Behavior: condition is staying the same    Pain: constant  Current: 3/10     Best: 2/10     Worst:3/10    Symptom Type/Quality: dull, aching, tender  Location[de-identified] Hip lateral right hip & buttock     Aggravated by: walking, standing, stairs, right side-lying    Relieved by: rest    Imaging results: XR HIP 2-3 VW W PELVIS RIGHT    Result Date: 10/18/2021  Radiology exam is complete. No Radiologist dictation. Please follow up with ordering provider. Past Medical History:  Past Medical History:   Diagnosis Date    Anxiety     Depression     Sjogren's disease ([facility] [address]      Past Surgical History:   Procedure Laterality Date    CATARACT REMOVAL Left     DILATION AND CURETTAGE OF UTERUS  1984    SHOULDER ARTHROSCOPY Right 2015    Excision loose bodies, Acromioplasty, Roxana resection distal clavicle  ROzzie Trujillo MD    TOTAL HIP ARTHROPLASTY Right 3/9/2021    RIGHT HIP TOTAL ARTHROPLASTY--ZAYDA performed by Indigo Angelo MD at [facility] OR       Medications:   Current Outpatient Medications   Medication Sig Dispense Refill    ciprofloxacin (CIPRO) 500 MG tablet TAKE 1 TABLET BY MOUTH TWICE DAILY      nitrofurantoin (MACRODANTIN) 100 MG capsule TAKE 1 CAPSULE BY MOUTH TWICE DAILY (Patient not taking: Reported on 10/18/2021)      Estradiol (VAGIFEM) 10 MCG TABS vaginal tablet Insert 1 tablet twice a week      BIOTIN PO Take 1 tablet by mouth daily  (Patient not taking: Reported on 10/18/2021)      ibuprofen (ADVIL;MOTRIN) 200 MG tablet Take 200 mg by mouth every 8 hours as needed for Pain      Cholecalciferol (VITAMIN D3) 25 MCG (1000 UT) CAPS Take 2 capsules by mouth daily       venlafaxine (EFFEXOR) 37.5 MG tablet Take 37.5 mg by mouth daily      NALTREXONE HCL PO Take 3 mg by mouth nightly        No current facility-administered medications for this visit. Occupation: retired. Exercise regimen: none    Patient Goals: pain relief, return to exercise regimen / fitness program    Precautions/Contraindications: none    OBJECTIVE:     Observations: well nourished female    Inspection: normal orthopedic exam    Gait: normal    Joint/Motion:    Hip:  Right:   AROM: limited arom right hip 50% of arom WNL. Right knee flex & ext [facility]. Muscle Length Testing: Moderate restrictions with RLE IT Band, glute, & hamstrings. Strength:    Hip:   Right: Hip: abd 4-/5, ext 3+/5, & flex 4/5 Knee: 5/5    Palpation: pt presents with tenderness at RLE IT Band, greater trochanter, & glute Min, Med, & Max        ASSESSMENT     Outcome Measure: Lower Extremity Functional Scale (LEFS) 35% impairment    Problems:    Pain reported 3/10   ROM decreased    Strength decreased    Decreased functional ability with walking, stairs, standing, inability to participate in exercise regimen / fitness program    Reason for Skilled Care: The pt presents with right hip pain, limited rom, restricted flexibility, tenderness, functional limitations, & weakness due to pt's diagnosis RLE COLLINS 3/9/2021.  Therefore, I recommend that the pt requires the skilled services of outpt PT Rehab to achieve LTGs, restore & resolve her deficits & limitations, & facilitate return to prior level of function/activity. [x] There are no barriers affecting plan of care or recovery    [] Barriers to this patient's plan of care or recovery include. Domestic Concerns:  [x] No  [] Yes:    Short Term goals (2 weeks)   Decrease reported pain to 1/10   Increase ROM to [facility]   Increase Strength to 4+/5    Able to perform/complete the following functions/tasks: pt no difficulty with light household chores pain free   Lower Extremity Functional Scale (LEFS) 20% impairment    Long Term goals (4-6 weeks)   Decrease reported pain to 0/10   Increase ROM to no deficits with flexibility   Increase Strength to 5/5    Able to perform/complete the following functions/tasks: pt resumes prior level of function/actiity   LEFS 10% impairment    Independent with Home Exercise Programs    Rehab Potential: [x] Good  [] Fair  [] Poor    PLAN       Treatment Plan: 1x/wk x 4-6wks  [x] Therapeutic Exercise  [x] Therapeutic Activity  [x] Neuromuscular Re-education   [x] Gait Training  [x] Balance Training  [x] Aerobic conditioning  [x] Manual Therapy  [] Massage/Fascial release   [] Work/Sport specific activities    [] Pain Neuroscience [x] Cold/hotpack  [] Vasocompression  [x] Electrical Stimulation  [] Lumbar/Cervical Traction  [] Ultrasound   [] Iontophoresis: 4 mg/mL Dexamethasone Sodium Phosphate 40-80 mAmin  [x] Dry Needling      [x] Instruction in HEP      []  Medication allergies reviewed for use of Dexamethasone Sodium Phosphate 4mg/ml  with iontophoresis treatments. Patient is not allergic.       The following CPT codes are likely to be used in the care of this patient: 19296 PT Evaluation: Low Complexity   04453 Therapeutic Exercise   97800 Neuromuscular Re-Education   95285 Therapeutic Activities   80227 Manual Therapy   98767 Gait Training    Electrical Stimulation      Suggested Professional Referral: [x] No  [] Yes:     Patient Education:  [x] Plans/Goals, Risks/Benefits discussed  [x] Home exercise program  Method of Education: [x] Verbal  [x] Demo  [x] Written  Comprehension of Education:  [x] Verbalizes understanding. [x] Demonstrates understanding. [] Needs Review. [] Demonstrates/verbalizes understanding of HEP/Ed previously given. Frequency: 1 day per week for 4-6 weeks    Patient understands diagnosis/prognosis and consents to treatment, plan and goals: [x] Yes    [] No     Thank you for the opportunity to work with your patient. If you have questions or comments, please contact me at numbers listed above. Electronically signed by: Aristides Martinez, [address]         Please sign Physician's Certification and return to: [facility] [address]  [facility]  Dept: [phone]  Dept Fax: [phone] Certification / Comments     Frequency/Duration 1 day per week for 4-6 weeks. Certification period from 10/19/2021  to 12/19/2021. I have reviewed the Plan of Care established for skilled therapy services and certify that the services are required and that they will be provided while the patient is under my care.     Physician's Comments/Revisions:               Physician's Printed Name:                                           [de-identified] Signature:                                                               Date:

## 2021-10-27 ENCOUNTER — TREATMENT (OUTPATIENT)
Dept: PHYSICAL THERAPY | Age: 66
End: 2021-10-27
Payer: COMMERCIAL

## 2021-10-27 DIAGNOSIS — Z96.641 STATUS POST TOTAL REPLACEMENT OF RIGHT HIP: Primary | ICD-10-CM

## 2021-10-27 PROCEDURE — 97112 NEUROMUSCULAR REEDUCATION: CPT | Performed by: PHYSICAL THERAPIST

## 2021-10-27 PROCEDURE — 97530 THERAPEUTIC ACTIVITIES: CPT | Performed by: PHYSICAL THERAPIST

## 2021-10-27 NOTE — PROGRESS NOTES
[address] [facility]   Phone: [phone]   Fax: [phone]      Physical Therapy Daily Treatment Note    Date: 10/27/2021  Patient Name: Radha Zheng  : 1955   MRN: [id number]  DOInjury: 3/9/2021  DOSx: 3/9/2021  Referring Provider: Catherine Silva MD  [address] [place] [address]     Medical Diagnosis:      Date of Procedure: 3/9/2021     Pre-Op Diagnosis: OSTEOARTHRITIS     Post-Op Diagnosis: Same       Procedure(s):  RIGHT HIP TOTAL ARTHROPLASTY--ZAYDA     Surgeon(s):  Catherine Silva MD    Outcome Measure: LEFS 35% Disability    S: Pt reports great compliance with HEP. O: Please refer to PT Rock 10/19/2021  Time 5005-7164     Visit 2/ Repeat outcome measure at mid point and end. Pain      Strength      Palpation      ROM      Modalities            Manual            Stretch      IT band supine      HS supine      Quad Prone      Standing toe raises x30 R & L  TA   Standing heel raises x30  TA   Band standing marching x20 R & L Blue band NR   Band standing leg curl x20 R & L Blue band NR   Band step-outs forward, lateral, & backward x20 R & L each Blue band TA   Band standing SLRs hip flex, abd, & ext x20 R & L each Blue band NR     NMR To improve balance for safe community and home ambulation    Resisted walk      FWD      BKWD      lat      March      Side stepping      Retro walk      Heel to toe      A:  Tolerated well. Above added to written HEP. Pt progressed with today's Tx session to perform standing resistive training with blue elastic band. P: Continue with rehab plan & progress to include squats.     Erika Casillas, PT OHPT [id number]    Treatment Charges: Mins Units   Initial Evaluation     Re-Evaluation     Ther Exercise         TE     Manual Therapy     MT     Ther Activities        TA 17 1   Gait Training          GT     Neuro Re-education NR 23 2   Modalities     Non-Billable Service Time     Other     Total Time/Units 40 3

## 2021-11-08 ENCOUNTER — TREATMENT (OUTPATIENT)
Dept: PHYSICAL THERAPY | Age: 66
End: 2021-11-08
Payer: COMMERCIAL

## 2021-11-08 DIAGNOSIS — Z96.641 STATUS POST TOTAL REPLACEMENT OF RIGHT HIP: Primary | ICD-10-CM

## 2021-11-08 PROCEDURE — 97112 NEUROMUSCULAR REEDUCATION: CPT | Performed by: PHYSICAL THERAPIST

## 2021-11-08 PROCEDURE — 97530 THERAPEUTIC ACTIVITIES: CPT | Performed by: PHYSICAL THERAPIST

## 2021-11-08 NOTE — PROGRESS NOTES
[address] [facility] [address] [facility]   Phone: [phone]   Fax: [phone]    Discharge Summary      Date:  2021   Patient: Ara Nash                  : 1955                        MRN: [id number]  Referring Provider: Mendel Corrigan MD  [address] [place] [address]                                 Medical Diagnosis:      D17.821 (ICD-10-CM) - Status post total hip replacement, right                Date of Procedure: 3/9/2021     Pre-Op Diagnosis: OSTEOARTHRITIS     Post-Op Diagnosis: Same       Procedure(s):  RIGHT HIP TOTAL ARTHROPLASTY--ZAYDA     Surgeon(s):  Mendel Corrigan MD        ATTENDANCE:  Patient attended 3 of 3 scheduled treatments from 10/19/2021  to 2021. TREATMENTS RECEIVED:  Therapeutic activity, therapeutic exercise, neuromuscular reeducation, HEP, manual    INITIAL STATUS:  Observations: well nourished female     Inspection: normal orthopedic exam     Gait: normal     Joint/Motion:     Hip:  Right:   AROM: limited arom right hip 50% of arom WNL. Right knee flex & ext [facility].       Muscle Length Testing: Moderate restrictions with RLE IT Band, glute, & hamstrings. Strength:     Hip:   Right: Hip: abd 4-/5, ext 3+/5, & flex 4/5 Knee: 5/5     Palpation: pt presents with tenderness at RLE IT Band, greater trochanter, & glute Min, Med, & Max    FINAL STATUS:  · Gait: pt AMB without gait deviations  · Strength: RLE hip MMT WFL  · AROM: RLE hip WFL      GOALS:  6 out of 6 Long Term Goals were obtained. PATIENT GOALS: improve right hip strength. Independent with HEP. REASON FOR DISCHARGE: Pt is independent with HEP. Pt request D/C. Strength & ROM WFL. No gait deviations. PATIENT EDUCATION/INSTRUCTIONS: continue HEP as instructed    RECOMMENDATIONS: call with questions or if additional services are warranted. Thank you for the opportunity to work with your patient.  If you have questions or comments, please contact me at numbers listed above.       Bridget Clement, [address] [facility] [address] 11/8/2021

## 2021-11-08 NOTE — PROGRESS NOTES
[address] [facility]   Phone: [phone]   Fax: [phone]      Physical Therapy Daily Treatment Note    Date: 2021  Patient Name: Bette Ureña  : 1955   MRN: [id number]  DOInjury: 3/9/2021  DOSx: 3/9/2021  Referring Provider: Toby Schmidt MD  [address] [place] [address]     Medical Diagnosis:      Date of Procedure: 3/9/2021     Pre-Op Diagnosis: OSTEOARTHRITIS     Post-Op Diagnosis: Same       Procedure(s):  RIGHT HIP TOTAL ARTHROPLASTY--ZAYDA     Surgeon(s):  Toby Schmidt MD    Outcome Measure: LEFS 35% Disability    S: Pt reports great compliance with HEP. O: Please refer to PT Rock 10/19/2021  Time [id number]     Visit 3/6 Repeat outcome measure at mid point and end. Pain      Strength      Palpation      ROM      Modalities            Manual            Stretch      LAQ x1 1min hold Yellow band NR   Squat walk x10 R & L Yellow band TA   Squat with hip abd x20 Yellow band TA   glute bridge with marching x10   NR   glute bridge with hip add x10 10s hold Ball squeeze NR   glute bridge with hip abd x10 10s hold Purple band NR   Side-lying clamshell x10 10s hold purple band NR   Prone off table hip ext x10 10s hold Purple band NR   Side-lying hip abd x10 10s hold Purple band NR   Prone hip ext x10 10s hold Purple band NR   Band step-outs forward, lateral, & backward x20 R & L each purple band TA   Band standing SLRs hip flex, abd, & ext x20 R & L each yellow band NR     NMR To improve balance for safe community and home ambulation    Resisted walk      FWD      BKWD      lat      March      Side stepping      Retro walk      Heel to toe      A:  Tolerated well. PT provided purple & yellow elastic band for HEP. The pt had no difficulty or c/o pain with today's exercises. P: PT at this time will D/C the pt to HEP.      Alphonse Reyes PT [facility]PT [id number]    Treatment Charges: Mins Units   Initial Evaluation     Re-Evaluation     Ther Exercise TE     Manual Therapy     MT     Ther Activities        TA 20 1   Gait Training          GT     Neuro Re-education NR 25 2   Modalities     Non-Billable Service Time     Other     Total Time/Units 45 3

## 2022-01-12 ENCOUNTER — OFFICE VISIT (OUTPATIENT)
Dept: ORTHOPEDIC SURGERY | Age: 67
End: 2022-01-12
Payer: COMMERCIAL

## 2022-01-12 VITALS — HEIGHT: 63 IN | WEIGHT: 153 LBS | BODY MASS INDEX: 27.11 KG/M2

## 2022-01-12 DIAGNOSIS — M16.11 PRIMARY OSTEOARTHRITIS OF RIGHT HIP: ICD-10-CM

## 2022-01-12 DIAGNOSIS — Z96.641 STATUS POST TOTAL HIP REPLACEMENT, RIGHT: Primary | ICD-10-CM

## 2022-01-12 PROCEDURE — G8484 FLU IMMUNIZE NO ADMIN: HCPCS | Performed by: NURSE PRACTITIONER

## 2022-01-12 PROCEDURE — 3017F COLORECTAL CA SCREEN DOC REV: CPT | Performed by: NURSE PRACTITIONER

## 2022-01-12 PROCEDURE — 1036F TOBACCO NON-USER: CPT | Performed by: NURSE PRACTITIONER

## 2022-01-12 PROCEDURE — G8427 DOCREV CUR MEDS BY ELIG CLIN: HCPCS | Performed by: NURSE PRACTITIONER

## 2022-01-12 PROCEDURE — 99213 OFFICE O/P EST LOW 20 MIN: CPT | Performed by: NURSE PRACTITIONER

## 2022-01-12 PROCEDURE — G8419 CALC BMI OUT NRM PARAM NOF/U: HCPCS | Performed by: NURSE PRACTITIONER

## 2022-01-12 NOTE — PROGRESS NOTES
Follow Up Post Operative Visit     Surgery: RIGHT HIP TOTAL ARTHROPLASTY--ZAYDA  Date: 3/9/2021  Surgeon(s):  Arlyn Eilzondo MD    Subjective:    Damir Portillo is here for follow up visit s/p above procedure. She is doing well. She has completed physical therapy. She reports she has resumed all daily activities have restrictions and has tolerated well    Controlled Substances Monitoring:        Physical Exam:    Height: 5' 3\" (1.6 m), Weight: 153 lb (69.4 kg) (per pt)    General: Alert and oriented x3, no acute distress  Cardiovascular/pulmonary: No labored breathing, peripheral perfusion intact  Musculoskeletal:    Right hip exam no pain with logroll, abduction and adduction are intact without pain. No pain or impingement in flexion during MARIVEL and FADIR exams. No weakness present on exam.  No swelling or deformity visualized. Imaging: X-ray including 3 views of the right hip and pelvis show stable alignment of right total hip arthroplasty without evidence of implant movement. Impression: Stable right total hip replacement out complication    Assessment and Plan: Status post right total hip arthroplasty    Today we discussed her right hip. Patient reports resuming all daily activities without restrictions. She has full range of motion of her right hip without pain or weakness present. New imaging obtained today of the right hip and pelvis were reviewed showing stable alignment of right hip arthroplasty. She will continue all daily activities as tolerated. She will follow-up in 4 months with Dr. Felipa Adams for likely final postoperative visit.       LUIS ANTONIO Matias-CNP  Orthopedic Surgery   01/12/22  12:00 PM

## 2022-03-22 ENCOUNTER — OFFICE VISIT (OUTPATIENT)
Dept: RHEUMATOLOGY | Age: 67
End: 2022-03-22
Payer: MEDICARE

## 2022-03-22 VITALS
HEIGHT: 63 IN | SYSTOLIC BLOOD PRESSURE: 118 MMHG | RESPIRATION RATE: 16 BRPM | BODY MASS INDEX: 26.93 KG/M2 | OXYGEN SATURATION: 98 % | DIASTOLIC BLOOD PRESSURE: 64 MMHG | WEIGHT: 152 LBS | HEART RATE: 68 BPM

## 2022-03-22 DIAGNOSIS — M35.01 SJOGREN'S SYNDROME WITH KERATOCONJUNCTIVITIS SICCA (HCC): Chronic | ICD-10-CM

## 2022-03-22 DIAGNOSIS — Z00.00 HEALTHCARE MAINTENANCE: ICD-10-CM

## 2022-03-22 DIAGNOSIS — M35.01 SJOGREN'S SYNDROME WITH KERATOCONJUNCTIVITIS SICCA (HCC): Primary | Chronic | ICD-10-CM

## 2022-03-22 LAB
ALBUMIN SERPL-MCNC: 3.6 G/DL (ref 3.5–5.2)
ALP BLD-CCNC: 89 U/L (ref 35–104)
ALT SERPL-CCNC: 13 U/L (ref 0–32)
ANION GAP SERPL CALCULATED.3IONS-SCNC: 6 MMOL/L (ref 7–16)
AST SERPL-CCNC: 23 U/L (ref 0–31)
BACTERIA: ABNORMAL /HPF
BASOPHILS ABSOLUTE: 0.02 E9/L (ref 0–0.2)
BASOPHILS RELATIVE PERCENT: 0.6 % (ref 0–2)
BILIRUB SERPL-MCNC: 0.2 MG/DL (ref 0–1.2)
BILIRUBIN URINE: NEGATIVE
BLOOD, URINE: ABNORMAL
BUN BLDV-MCNC: 10 MG/DL (ref 6–23)
C-REACTIVE PROTEIN: 1 MG/DL (ref 0–0.4)
CALCIUM SERPL-MCNC: 8.7 MG/DL (ref 8.6–10.2)
CHLORIDE BLD-SCNC: 102 MMOL/L (ref 98–107)
CLARITY: ABNORMAL
CO2: 29 MMOL/L (ref 22–29)
COLOR: YELLOW
CREAT SERPL-MCNC: 0.7 MG/DL (ref 0.5–1)
EOSINOPHILS ABSOLUTE: 0.01 E9/L (ref 0.05–0.5)
EOSINOPHILS RELATIVE PERCENT: 0.3 % (ref 0–6)
EPITHELIAL CELLS, UA: ABNORMAL /HPF
GFR AFRICAN AMERICAN: >60
GFR NON-AFRICAN AMERICAN: >60 ML/MIN/1.73
GLUCOSE BLD-MCNC: 68 MG/DL (ref 74–99)
GLUCOSE URINE: NEGATIVE MG/DL
HCT VFR BLD CALC: 39.4 % (ref 34–48)
HEMOGLOBIN: 12.2 G/DL (ref 11.5–15.5)
IMMATURE GRANULOCYTES #: 0.01 E9/L
IMMATURE GRANULOCYTES %: 0.3 % (ref 0–5)
KETONES, URINE: NEGATIVE MG/DL
LEUKOCYTE ESTERASE, URINE: ABNORMAL
LYMPHOCYTES ABSOLUTE: 0.92 E9/L (ref 1.5–4)
LYMPHOCYTES RELATIVE PERCENT: 26 % (ref 20–42)
MCH RBC QN AUTO: 28.8 PG (ref 26–35)
MCHC RBC AUTO-ENTMCNC: 31 % (ref 32–34.5)
MCV RBC AUTO: 92.9 FL (ref 80–99.9)
MONOCYTES ABSOLUTE: 0.5 E9/L (ref 0.1–0.95)
MONOCYTES RELATIVE PERCENT: 14.1 % (ref 2–12)
NEUTROPHILS ABSOLUTE: 2.08 E9/L (ref 1.8–7.3)
NEUTROPHILS RELATIVE PERCENT: 58.7 % (ref 43–80)
NITRITE, URINE: NEGATIVE
PDW BLD-RTO: 13.9 FL (ref 11.5–15)
PH UA: 5 (ref 5–9)
PLATELET # BLD: 212 E9/L (ref 130–450)
PMV BLD AUTO: 12.3 FL (ref 7–12)
POTASSIUM SERPL-SCNC: 4.2 MMOL/L (ref 3.5–5)
PROTEIN UA: NEGATIVE MG/DL
RBC # BLD: 4.24 E12/L (ref 3.5–5.5)
RBC UA: ABNORMAL /HPF (ref 0–2)
RHEUMATOID FACTOR: 38 IU/ML (ref 0–13)
SEDIMENTATION RATE, ERYTHROCYTE: 79 MM/HR (ref 0–20)
SODIUM BLD-SCNC: 137 MMOL/L (ref 132–146)
SPECIFIC GRAVITY UA: 1.02 (ref 1–1.03)
UROBILINOGEN, URINE: 0.2 E.U./DL
WBC # BLD: 3.5 E9/L (ref 4.5–11.5)
WBC UA: ABNORMAL /HPF (ref 0–5)

## 2022-03-22 PROCEDURE — G8484 FLU IMMUNIZE NO ADMIN: HCPCS | Performed by: INTERNAL MEDICINE

## 2022-03-22 PROCEDURE — 1123F ACP DISCUSS/DSCN MKR DOCD: CPT | Performed by: INTERNAL MEDICINE

## 2022-03-22 PROCEDURE — G8427 DOCREV CUR MEDS BY ELIG CLIN: HCPCS | Performed by: INTERNAL MEDICINE

## 2022-03-22 PROCEDURE — 99214 OFFICE O/P EST MOD 30 MIN: CPT | Performed by: INTERNAL MEDICINE

## 2022-03-22 PROCEDURE — 3017F COLORECTAL CA SCREEN DOC REV: CPT | Performed by: INTERNAL MEDICINE

## 2022-03-22 PROCEDURE — G8417 CALC BMI ABV UP PARAM F/U: HCPCS | Performed by: INTERNAL MEDICINE

## 2022-03-22 PROCEDURE — 1036F TOBACCO NON-USER: CPT | Performed by: INTERNAL MEDICINE

## 2022-03-22 PROCEDURE — 4040F PNEUMOC VAC/ADMIN/RCVD: CPT | Performed by: INTERNAL MEDICINE

## 2022-03-22 PROCEDURE — 1090F PRES/ABSN URINE INCON ASSESS: CPT | Performed by: INTERNAL MEDICINE

## 2022-03-22 PROCEDURE — G8400 PT W/DXA NO RESULTS DOC: HCPCS | Performed by: INTERNAL MEDICINE

## 2022-03-22 ASSESSMENT — ENCOUNTER SYMPTOMS
ABDOMINAL PAIN: 0
DIARRHEA: 0
COUGH: 0
TROUBLE SWALLOWING: 0
COLOR CHANGE: 0
SHORTNESS OF BREATH: 0
VOMITING: 0
NAUSEA: 0

## 2022-03-22 NOTE — LETTER
[address]  [facility]  Phone: [phone]  Fax: [phone],     March 22, 2022     Jones Anne, [address]  [address] [place] [address] Genie Alexis    Patient: Sherri Dior   MR Number: [id number]   YOB: 1955   Date of Visit: 3/22/2022       Dear Sacha Quinones: Thank you for referring Pierce Boas to me for evaluation/treatment. Below are the relevant portions of my assessment and plan of care. ASSESSMENT/PLAN:    Sherri Dior 1955 is a 74 y.o. female seen in follow-up for Sjogren's syndrome. 1.   Sjogren's syndrome-manifest as positive SSA, SSB, low positive rheumatoid factor, sicca symptoms. She follows with ophthalmology and is on naltrexone which seems to be helping. She also drinks plenty of water. She does not have any obvious extraglandular manifestations at this point. We will update blood work as below. 2.  Healthcare maintenance-would recommend screening bone density scan if she has not had one. 1. Sjogren's syndrome with keratoconjunctivitis sicca (HCC)  -     CBC with Auto Differential; Future  -     Comprehensive Metabolic Panel; Future  -     Rheumatoid Factor; Future  -     Urinalysis; Future  -     Sedimentation Rate; Future  -     C-Reactive Protein; Future  -     Electrophoresis Protein, Serum; Future  2. Healthcare maintenance      Return in about 6 months (around 9/22/2022). If you have questions, please do not hesitate to call me. I look forward to following Marciano Felix along with you.     Sincerely,      Violeta Son DO

## 2022-03-22 NOTE — PROGRESS NOTES
Juwan Farrar 1955 is a 74 y.o. female, here for evaluation of the following chief complaint(s):  6 Month Follow-Up (+ BE, Sjogrens syndrome )         ASSESSMENT/PLAN:    Juwan Farrar 1955 is a 74 y.o. female seen in follow-up for Sjogren's syndrome. 1.   Sjogren's syndrome-manifest as positive SSA, SSB, low positive rheumatoid factor, sicca symptoms. She follows with ophthalmology and is on naltrexone which seems to be helping. She also drinks plenty of water. She does not have any obvious extraglandular manifestations at this point. We will update blood work as below. 2.  Healthcare maintenance-would recommend screening bone density scan if she has not had one. 1. Sjogren's syndrome with keratoconjunctivitis sicca (HCC)  -     CBC with Auto Differential; Future  -     Comprehensive Metabolic Panel; Future  -     Rheumatoid Factor; Future  -     Urinalysis; Future  -     Sedimentation Rate; Future  -     C-Reactive Protein; Future  -     Electrophoresis Protein, Serum; Future  2. Healthcare maintenance      Return in about 6 months (around 9/22/2022). Subjective   SUBJECTIVE/OBJECTIVE:    HPI: Juwan Farrar 1955 is a 74 y.o. female seen in follow-up for Sjogren's syndrome. Last visit we did a work-up which did show positive SSA and SSB as well as low positive rheumatoid factor. CCP was negative. She has not had significant joint issues and no evidence of synovitis on exam to suggest RA. She has dry eyes and dry mouth. She follows with ophthalmology and drinks plenty of water. She has no extraglandular manifestations. Initial history: Patient states that for about the last month she has felt like there is a vein that runs across the inside of her buccal mucosa and lower lip that is inflamed and painful. The tip of her tongue has also been very sensitive for the last month.   Was part of work-up for that her PCP did an BE which was positive at 1: 5120 as well as positive SSA and SSB. Review of records shows that she actually saw a rheumatology once back in 2011 at the [facility] clinic and was diagnosed with Sjogren's syndrome. There was some talk of a trial of Plaquenil but the patient cannot recall being on that. She does see ophthalmology and takes naltrexone for dry eyes which does seem to help. She states that more recently she also had a rash on the right flank left neck and right leg. She states that it resolved with athlete's foot cream.  Her PCP did also give her a prednisone taper for her oral issues but that did not really help. Otherwise she states that she feels fatigued and having some urinary pressure. Denies fever, rash, oral ulcers, nasal ulcers, alopecia, lymphadenopathy, chest pain, shortness of breath, Raynaud's, dysuria, hematuria, history of DVT. Past Medical History:   Diagnosis Date    Anxiety     Depression     Sjogren's disease ([facility] [address]         Review of Systems   Constitutional: Negative for fatigue and fever. HENT: Negative for mouth sores and trouble swallowing. Respiratory: Negative for cough and shortness of breath. Cardiovascular: Negative for chest pain. Gastrointestinal: Negative for abdominal pain, diarrhea, nausea and vomiting. Genitourinary: Negative for dysuria and hematuria. Musculoskeletal: Negative for arthralgias and joint swelling. Skin: Negative for color change and rash. Neurological: Negative for weakness and numbness. Hematological: Negative for adenopathy. All other systems reviewed and are negative. Objective   Vitals:    03/22/22 0838   BP: 118/64   Pulse: 68   Resp: 16   SpO2: 98%      Physical Exam  Constitutional:       General: She is not in acute distress. Appearance: Normal appearance. HENT:      Head: Normocephalic and atraumatic.       Nose: Nose normal.      Mouth/Throat:      Comments: She has poor salivary pooling but no obviously swollen salivary glands and nothing striking on exam of buccal mucosa. Eyes:      General: No scleral icterus. Pulmonary:      Effort: Pulmonary effort is normal.   Musculoskeletal:         General: No swelling, tenderness or deformity. Skin:     General: Skin is warm and dry. Findings: No rash. Neurological:      General: No focal deficit present. Mental Status: She is alert and oriented to person, place, and time. Mental status is at baseline. Psychiatric:         Mood and Affect: Mood normal.         Behavior: Behavior normal.            Lab Results   Component Value Date    WBC 4.0 (L) 09/20/2021    HGB 12.6 09/20/2021    HCT 40.3 09/20/2021    MCV 91.0 09/20/2021     09/20/2021     Lab Results   Component Value Date     09/20/2021    K 4.0 09/20/2021     09/20/2021    CO2 26 09/20/2021    BUN 12 09/20/2021    CREATININE 0.7 09/20/2021    GLUCOSE 87 09/20/2021    CALCIUM 9.2 09/20/2021    PROT 8.4 (H) 09/20/2021    LABALBU 3.3 (L) 09/20/2021    LABALBU 4.0 09/20/2021    BILITOT 0.3 09/20/2021    ALKPHOS 100 09/20/2021    AST 20 09/20/2021    ALT 11 09/20/2021    LABGLOM >60 09/20/2021    GFRAA >60 09/20/2021     No results found for: BE  No results found for: RHEUMFACTOR  Lab Results   Component Value Date    SEDRATE 44 (H) 09/20/2021     Lab Results   Component Value Date    CRP 0.5 (H) 09/20/2021            An electronic signature was used to authenticate this note. This note was generated with a voice recognition dictation system. Please disregard any errors or omission which have escaped my review.     --Kee Eli, DO

## 2022-03-23 LAB
ALBUMIN SERPL-MCNC: 3.1 G/DL (ref 3.5–4.7)
ALPHA-1-GLOBULIN: 0.3 G/DL (ref 0.2–0.4)
ALPHA-2-GLOBULIN: 0.8 G/DL (ref 0.5–1)
BETA GLOBULIN: 1.1 G/DL (ref 0.8–1.3)
ELECTROPHORESIS: ABNORMAL
GAMMA GLOBULIN: 3.3 G/DL (ref 0.7–1.6)
TOTAL PROTEIN: 8.9 G/DL (ref 6.4–8.3)

## 2022-06-09 ENCOUNTER — OFFICE VISIT (OUTPATIENT)
Dept: ORTHOPEDIC SURGERY | Age: 67
End: 2022-06-09
Payer: MEDICARE

## 2022-06-09 VITALS — HEIGHT: 63 IN | WEIGHT: 150 LBS | BODY MASS INDEX: 26.58 KG/M2

## 2022-06-09 DIAGNOSIS — Z96.641 STATUS POST TOTAL HIP REPLACEMENT, RIGHT: Primary | ICD-10-CM

## 2022-06-09 PROCEDURE — 99213 OFFICE O/P EST LOW 20 MIN: CPT | Performed by: ORTHOPAEDIC SURGERY

## 2022-06-09 PROCEDURE — G8417 CALC BMI ABV UP PARAM F/U: HCPCS | Performed by: ORTHOPAEDIC SURGERY

## 2022-06-09 PROCEDURE — G8427 DOCREV CUR MEDS BY ELIG CLIN: HCPCS | Performed by: ORTHOPAEDIC SURGERY

## 2022-06-09 PROCEDURE — G8400 PT W/DXA NO RESULTS DOC: HCPCS | Performed by: ORTHOPAEDIC SURGERY

## 2022-06-09 PROCEDURE — 1036F TOBACCO NON-USER: CPT | Performed by: ORTHOPAEDIC SURGERY

## 2022-06-09 PROCEDURE — 3017F COLORECTAL CA SCREEN DOC REV: CPT | Performed by: ORTHOPAEDIC SURGERY

## 2022-06-09 PROCEDURE — 1123F ACP DISCUSS/DSCN MKR DOCD: CPT | Performed by: ORTHOPAEDIC SURGERY

## 2022-06-09 PROCEDURE — 1090F PRES/ABSN URINE INCON ASSESS: CPT | Performed by: ORTHOPAEDIC SURGERY

## 2022-06-09 NOTE — PROGRESS NOTES
Chief Complaint:   Chief Complaint   Patient presents with    Follow Up After Procedure     Fifteen months out right COLLINS. States she is doing well. HPI 15 months postop right total hip arthroplasty. Her prior gluteal symptoms resolved well after seeing physical therapy. She is fully active and feels great. No significant pain reported with respect to her left hip. Patient Active Problem List   Diagnosis    Acute febrile illness    [facility]ogren's disease ([address]    Anxiety and depression    Primary osteoarthritis of right hip    Status post total hip replacement, right       Past Medical History:   Diagnosis Date    Anxiety     Depression     Sjogren's disease ([address]        Past Surgical History:   Procedure Laterality Date    CATARACT REMOVAL Left     DILATION AND CURETTAGE OF UTERUS  1984    SHOULDER ARTHROSCOPY Right 04/29/2015    Excision loose bodies, Acromioplasty, Roxana resection distal clavicle  KENDRA Rendon MD    TOTAL HIP ARTHROPLASTY Right 3/9/2021    RIGHT HIP TOTAL ARTHROPLASTY--ZAYDA performed by Cuca Tejeda MD at [facility] OR       Current Outpatient Medications   Medication Sig Dispense Refill    Estradiol (VAGIFEM) 10 MCG TABS vaginal tablet Insert 1 tablet twice a week      Cholecalciferol (VITAMIN D3) 25 MCG (1000 UT) CAPS Take 2 capsules by mouth daily       venlafaxine (EFFEXOR) 37.5 MG tablet Take 37.5 mg by mouth daily      NALTREXONE HCL PO Take 3 mg by mouth nightly       ibuprofen (ADVIL;MOTRIN) 200 MG tablet Take 200 mg by mouth every 8 hours as needed for Pain (Patient not taking: Reported on 6/9/2022)       No current facility-administered medications for this visit.        Allergies   Allergen Reactions    Bee Venom Swelling     Does not have epi pen    Pcn [Penicillins]        Social History     Socioeconomic History    Marital status:      Spouse name: None    Number of children: None    Years of education: None    Highest education level: None   Occupational History    None   Tobacco Use    Smoking status: Never Smoker    Smokeless tobacco: Never Used   Substance and Sexual Activity    Alcohol use: No    Drug use: No    Sexual activity: Yes     Partners: Male   Other Topics Concern    None   Social History Narrative    None     Social Determinants of Health     Financial Resource Strain:     Difficulty of Paying Living Expenses: Not on file   Food Insecurity:     Worried About Running Out of Food in the Last Year: Not on file    Shiv of Food in the Last Year: Not on file   Transportation Needs:     Lack of Transportation (Medical): Not on file    Lack of Transportation (Non-Medical): Not on file   Physical Activity:     Days of Exercise per Week: Not on file    Minutes of Exercise per Session: Not on file   Stress:     Feeling of Stress : Not on file   Social Connections:     Frequency of Communication with Friends and Family: Not on file    Frequency of Social Gatherings with Friends and Family: Not on file    Attends Mosque Services: Not on file    Active Member of [address] [place] [address] or Organizations: Not on file    Attends Club or Organization Meetings: Not on file    Marital Status: Not on file   Intimate Partner Violence:     Fear of Current or Ex-Partner: Not on file    Emotionally Abused: Not on file    Physically Abused: Not on file    Sexually Abused: Not on file   Housing Stability:     Unable to Pay for Housing in the Last Year: Not on file    Number of [place] in the Last Year: Not on file    Unstable Housing in the Last Year: Not on file       Family History   Problem Relation Age of Onset    Liver Disease Mother     Cancer Father         lung liver cancer major smoker    Cancer Brother         pancreatic cancer         Review of Systems   No fever, chills, or other constitutionalsymptoms. No numbness or other neuro symptoms. No chest pain. No dyspnea.     [unfilled]   Patient stands and walks independently and briskly without a limp. Excellent balance on single-leg stance negative Trendelenburg sign. Full rotation right hip without pain. She has full internal rotation left hip with slight discomfort at the extreme of internal rotation. Physical Exam    Patient is alert and oriented. Well-developed well-nourished. BMI 26.6. Pupils equal and reactive. Scleraeanicteric. Neck supple  Lungs clear. Cardiac rate and rhythm regular. Abdomen soft and nontender. Skin warm and dry. XRAY: X-ray today AP pelvis with AP and lateral views right hip. Right total hip arthroplasty with excellent fit and alignment unchanged from postop. No subsidence lucency or other complicating feature. Stage III degenerative changes are noted in the left hip. Impression: Intact right total hip arthroplasty in good alignment. Moderate degenerative changes left hip. ASSESSMENT/PLAN:    Cherelle Friedman was seen today for follow up after procedure. Diagnoses and all orders for this visit:    Status post total hip replacement, right  -     XR HIP 2-3 VW W PELVIS RIGHT; Future    Findings and images reviewed with the patient. She is doing very well should continue her maintenance gluteal exercises. She does have arthritic changes evident in the left hip but not very symptomatic currently. She will follow-up with this office as needed. Return if symptoms worsen or fail to improve.        Trevon Vaughan MD    6/9/2022  9:21 AM

## 2022-06-24 NOTE — TELEPHONE ENCOUNTER
If she has surgery and early March, she should be good to travel by late May.   Needs to see me again preop
Patient called. She is ready to proceed with Rt COLLINS. She is planning a vacation to [facility] last week of May / first week of June. If she has surgery ASAP, do you feel she would be ready to make trip to [facility] driving at the end of May. If not she will wait until after vacation in June to schedule surgery.
Spoke with patient, scheduled appt for Monday 2/8/2021 for discussion of Rt COLLINS.
2006 primary csection failed induction 7-4  2009 repeat 9-7  sab x4 with D&C x3  TOP x1

## 2022-09-22 ENCOUNTER — OFFICE VISIT (OUTPATIENT)
Dept: RHEUMATOLOGY | Age: 67
End: 2022-09-22
Payer: MEDICARE

## 2022-09-22 VITALS
DIASTOLIC BLOOD PRESSURE: 78 MMHG | RESPIRATION RATE: 16 BRPM | HEIGHT: 64 IN | HEART RATE: 56 BPM | BODY MASS INDEX: 25.61 KG/M2 | OXYGEN SATURATION: 97 % | SYSTOLIC BLOOD PRESSURE: 126 MMHG | WEIGHT: 150 LBS

## 2022-09-22 DIAGNOSIS — M35.01 SJOGREN'S SYNDROME WITH KERATOCONJUNCTIVITIS SICCA (HCC): Primary | Chronic | ICD-10-CM

## 2022-09-22 DIAGNOSIS — M35.01 SJOGREN'S SYNDROME WITH KERATOCONJUNCTIVITIS SICCA (HCC): Chronic | ICD-10-CM

## 2022-09-22 DIAGNOSIS — Z00.00 HEALTHCARE MAINTENANCE: ICD-10-CM

## 2022-09-22 LAB
ALBUMIN SERPL-MCNC: 3.4 G/DL (ref 3.5–5.2)
ALP BLD-CCNC: 86 U/L (ref 35–104)
ALT SERPL-CCNC: 25 U/L (ref 0–32)
ANION GAP SERPL CALCULATED.3IONS-SCNC: 8 MMOL/L (ref 7–16)
AST SERPL-CCNC: 31 U/L (ref 0–31)
BACTERIA: ABNORMAL /HPF
BASOPHILS ABSOLUTE: 0.04 E9/L (ref 0–0.2)
BASOPHILS RELATIVE PERCENT: 0.9 % (ref 0–2)
BILIRUB SERPL-MCNC: 0.2 MG/DL (ref 0–1.2)
BILIRUBIN URINE: NEGATIVE
BLOOD, URINE: NEGATIVE
BUN BLDV-MCNC: 12 MG/DL (ref 6–23)
C-REACTIVE PROTEIN: 0.4 MG/DL (ref 0–0.4)
CALCIUM SERPL-MCNC: 8.9 MG/DL (ref 8.6–10.2)
CHLORIDE BLD-SCNC: 104 MMOL/L (ref 98–107)
CLARITY: CLEAR
CO2: 27 MMOL/L (ref 22–29)
COLOR: YELLOW
CREAT SERPL-MCNC: 0.7 MG/DL (ref 0.5–1)
EOSINOPHILS ABSOLUTE: 0.04 E9/L (ref 0.05–0.5)
EOSINOPHILS RELATIVE PERCENT: 0.9 % (ref 0–6)
GFR AFRICAN AMERICAN: >60
GFR NON-AFRICAN AMERICAN: >60 ML/MIN/1.73
GLUCOSE BLD-MCNC: 90 MG/DL (ref 74–99)
GLUCOSE URINE: NEGATIVE MG/DL
HCT VFR BLD CALC: 36.5 % (ref 34–48)
HEMOGLOBIN: 11.6 G/DL (ref 11.5–15.5)
IMMATURE GRANULOCYTES #: 0.01 E9/L
IMMATURE GRANULOCYTES %: 0.2 % (ref 0–5)
KETONES, URINE: NEGATIVE MG/DL
LEUKOCYTE ESTERASE, URINE: ABNORMAL
LYMPHOCYTES ABSOLUTE: 0.9 E9/L (ref 1.5–4)
LYMPHOCYTES RELATIVE PERCENT: 20.6 % (ref 20–42)
MCH RBC QN AUTO: 28.4 PG (ref 26–35)
MCHC RBC AUTO-ENTMCNC: 31.8 % (ref 32–34.5)
MCV RBC AUTO: 89.2 FL (ref 80–99.9)
MONOCYTES ABSOLUTE: 0.46 E9/L (ref 0.1–0.95)
MONOCYTES RELATIVE PERCENT: 10.5 % (ref 2–12)
NEUTROPHILS ABSOLUTE: 2.92 E9/L (ref 1.8–7.3)
NEUTROPHILS RELATIVE PERCENT: 66.9 % (ref 43–80)
NITRITE, URINE: NEGATIVE
PDW BLD-RTO: 14.1 FL (ref 11.5–15)
PH UA: 6 (ref 5–9)
PLATELET # BLD: 190 E9/L (ref 130–450)
PMV BLD AUTO: 12.2 FL (ref 7–12)
POTASSIUM SERPL-SCNC: 4.6 MMOL/L (ref 3.5–5)
PROTEIN UA: NEGATIVE MG/DL
RBC # BLD: 4.09 E12/L (ref 3.5–5.5)
RBC UA: ABNORMAL /HPF (ref 0–2)
RENAL EPITHELIAL, UA: ABNORMAL /HPF
RHEUMATOID FACTOR: 45 IU/ML (ref 0–13)
SEDIMENTATION RATE, ERYTHROCYTE: 71 MM/HR (ref 0–20)
SODIUM BLD-SCNC: 139 MMOL/L (ref 132–146)
SPECIFIC GRAVITY UA: 1.02 (ref 1–1.03)
UROBILINOGEN, URINE: 0.2 E.U./DL
WBC # BLD: 4.4 E9/L (ref 4.5–11.5)
WBC UA: ABNORMAL /HPF (ref 0–5)

## 2022-09-22 PROCEDURE — 1090F PRES/ABSN URINE INCON ASSESS: CPT | Performed by: INTERNAL MEDICINE

## 2022-09-22 PROCEDURE — 1123F ACP DISCUSS/DSCN MKR DOCD: CPT | Performed by: INTERNAL MEDICINE

## 2022-09-22 PROCEDURE — 99214 OFFICE O/P EST MOD 30 MIN: CPT | Performed by: INTERNAL MEDICINE

## 2022-09-22 PROCEDURE — 3017F COLORECTAL CA SCREEN DOC REV: CPT | Performed by: INTERNAL MEDICINE

## 2022-09-22 PROCEDURE — G8427 DOCREV CUR MEDS BY ELIG CLIN: HCPCS | Performed by: INTERNAL MEDICINE

## 2022-09-22 PROCEDURE — 1036F TOBACCO NON-USER: CPT | Performed by: INTERNAL MEDICINE

## 2022-09-22 PROCEDURE — G8400 PT W/DXA NO RESULTS DOC: HCPCS | Performed by: INTERNAL MEDICINE

## 2022-09-22 PROCEDURE — G8417 CALC BMI ABV UP PARAM F/U: HCPCS | Performed by: INTERNAL MEDICINE

## 2022-09-22 RX ORDER — CEVIMELINE HYDROCHLORIDE 30 MG/1
30 CAPSULE ORAL 3 TIMES DAILY
Qty: 90 CAPSULE | Refills: 3 | Status: SHIPPED | OUTPATIENT
Start: 2022-09-22

## 2022-09-22 RX ORDER — CEVIMELINE HYDROCHLORIDE 30 MG/1
CAPSULE ORAL
Qty: 270 CAPSULE | OUTPATIENT
Start: 2022-09-22

## 2022-09-22 ASSESSMENT — ENCOUNTER SYMPTOMS
NAUSEA: 0
SHORTNESS OF BREATH: 0
DIARRHEA: 0
VOMITING: 0
ABDOMINAL PAIN: 0
COUGH: 0
COLOR CHANGE: 0
TROUBLE SWALLOWING: 0

## 2022-09-22 NOTE — PROGRESS NOTES
Jackie Kasper 1955 is a 74 y.o. female, here for evaluation of the following chief complaint(s):  Follow-up (Patient here for a follow up on sjogrens. )         ASSESSMENT/PLAN:    Jackie Kasper 1955 is a 74 y.o. female seen in follow-up for Sjogren's syndrome. 1.   Sjogren's syndrome-manifest as positive SSA, SSB, low positive rheumatoid factor, sicca symptoms. She follows with ophthalmology and is on naltrexone which seems to be helping for dry eyes. Has been having more issues with dry mouth and actually had to have 2 teeth extracted and is just finishing up antibiotics for a dental infection. We will try her on cevimeline 3 times daily. We will also give her some xerostomia spray. She does not have any obvious extraglandular manifestations at this point. We will update blood work as below. 2.  Healthcare maintenance-would recommend screening bone density scan if she has not had one. 1. Sjogren's syndrome with keratoconjunctivitis sicca (HCC)  -     CBC with Auto Differential; Future  -     Comprehensive Metabolic Panel; Future  -     Rheumatoid Factor; Future  -     Urinalysis; Future  -     Sedimentation Rate; Future  -     C-Reactive Protein; Future  -     Electrophoresis Protein, Serum; Future  2. Healthcare maintenance      Return in about 6 months (around 3/22/2023). Subjective   SUBJECTIVE/OBJECTIVE:    HPI: Jackie Kasper 1955 is a 74 y.o. female seen in follow-up for Sjogren's syndrome. Prior work-up did a work-up which did show positive SSA and SSB as well as low positive rheumatoid factor. CCP was negative. She has not had significant joint issues and no evidence of synovitis on exam to suggest RA. She has dry eyes and dry mouth. She follows with ophthalmology. She has been having more issues with dry mouth lately. She actually had to have 2 teeth extracted earlier this month and has been treated for a dental infection.   She is just finishing up Hematological:  Negative for adenopathy. All other systems reviewed and are negative. Objective   Vitals:    09/22/22 0859   BP: 126/78   Pulse: 56   Resp: 16   SpO2: 97%      Physical Exam  Constitutional:       General: She is not in acute distress. Appearance: Normal appearance. HENT:      Head: Normocephalic and atraumatic. Right Ear: External ear normal.      Left Ear: External ear normal.      Nose: Nose normal.      Mouth/Throat:      Comments: She has poor salivary pooling but no obviously swollen salivary glands and nothing striking on exam of buccal mucosa. Eyes:      General: No scleral icterus. Pulmonary:      Effort: Pulmonary effort is normal.   Musculoskeletal:         General: No swelling, tenderness or deformity. Skin:     General: Skin is warm and dry. Findings: No rash. Neurological:      General: No focal deficit present. Mental Status: She is alert and oriented to person, place, and time. Mental status is at baseline.    Psychiatric:         Mood and Affect: Mood normal.         Behavior: Behavior normal.          Lab Results   Component Value Date    WBC 3.5 (L) 03/22/2022    HGB 12.2 03/22/2022    HCT 39.4 03/22/2022    MCV 92.9 03/22/2022     03/22/2022     Lab Results   Component Value Date     03/22/2022    K 4.2 03/22/2022     03/22/2022    CO2 29 03/22/2022    BUN 10 03/22/2022    CREATININE 0.7 03/22/2022    GLUCOSE 68 (L) 03/22/2022    CALCIUM 8.7 03/22/2022    PROT 8.9 (H) 03/22/2022    LABALBU 3.1 (L) 03/22/2022    LABALBU 3.6 03/22/2022    BILITOT 0.2 03/22/2022    ALKPHOS 89 03/22/2022    AST 23 03/22/2022    ALT 13 03/22/2022    LABGLOM >60 03/22/2022    GFRAA >60 03/22/2022     No results found for: BE  No results found for: RHEUMFACTOR  Lab Results   Component Value Date    SEDRATE 79 (H) 03/22/2022     Lab Results   Component Value Date    CRP 1.0 (H) 03/22/2022            An electronic signature was used to authenticate this note. This note was generated with a voice recognition dictation system. Please disregard any errors or omission which have escaped my review.     --Iliana Diaz, DO

## 2022-09-23 LAB
ALBUMIN SERPL-MCNC: 3.3 G/DL (ref 3.5–4.7)
ALPHA-1-GLOBULIN: 0.3 G/DL (ref 0.2–0.4)
ALPHA-2-GLOBULIN: 0.8 G/DL (ref 0.5–1)
BETA GLOBULIN: 1.1 G/DL (ref 0.8–1.3)
ELECTROPHORESIS: ABNORMAL
GAMMA GLOBULIN: 3.4 G/DL (ref 0.7–1.6)
TOTAL PROTEIN: 8.9 G/DL (ref 6.4–8.3)

## 2022-11-04 ENCOUNTER — APPOINTMENT (OUTPATIENT)
Dept: GENERAL RADIOLOGY | Age: 67
End: 2022-11-04
Payer: MEDICARE

## 2022-11-04 ENCOUNTER — HOSPITAL ENCOUNTER (EMERGENCY)
Age: 67
Discharge: HOME | End: 2022-11-04
Attending: EMERGENCY MEDICINE
Payer: MEDICARE

## 2022-11-04 VITALS
BODY MASS INDEX: 25.95 KG/M2 | OXYGEN SATURATION: 99 % | DIASTOLIC BLOOD PRESSURE: 89 MMHG | TEMPERATURE: 97.9 F | WEIGHT: 152 LBS | SYSTOLIC BLOOD PRESSURE: 136 MMHG | RESPIRATION RATE: 16 BRPM | HEART RATE: 70 BPM | HEIGHT: 64 IN

## 2022-11-04 DIAGNOSIS — S82.891A CLOSED FRACTURE OF RIGHT ANKLE, INITIAL ENCOUNTER: Primary | ICD-10-CM

## 2022-11-04 PROCEDURE — 6370000000 HC RX 637 (ALT 250 FOR IP): Performed by: NURSE PRACTITIONER

## 2022-11-04 PROCEDURE — 73610 X-RAY EXAM OF ANKLE: CPT

## 2022-11-04 PROCEDURE — 99283 EMERGENCY DEPT VISIT LOW MDM: CPT

## 2022-11-04 RX ORDER — IBUPROFEN 800 MG/1
800 TABLET ORAL ONCE
Status: COMPLETED | OUTPATIENT
Start: 2022-11-04 | End: 2022-11-04

## 2022-11-04 RX ORDER — HYDROCODONE BITARTRATE AND ACETAMINOPHEN 5; 325 MG/1; MG/1
1 TABLET ORAL EVERY 6 HOURS PRN
Qty: 12 TABLET | Refills: 0 | Status: SHIPPED | OUTPATIENT
Start: 2022-11-04 | End: 2022-11-07

## 2022-11-04 RX ADMIN — IBUPROFEN 800 MG: 800 TABLET, FILM COATED ORAL at 18:19

## 2022-11-04 ASSESSMENT — PAIN DESCRIPTION - DESCRIPTORS
DESCRIPTORS: ACHING;SHARP
DESCRIPTORS: THROBBING

## 2022-11-04 ASSESSMENT — PAIN DESCRIPTION - FREQUENCY: FREQUENCY: CONTINUOUS

## 2022-11-04 ASSESSMENT — PAIN SCALES - GENERAL
PAINLEVEL_OUTOF10: 6
PAINLEVEL_OUTOF10: 6

## 2022-11-04 ASSESSMENT — PAIN DESCRIPTION - ORIENTATION
ORIENTATION: RIGHT
ORIENTATION: RIGHT

## 2022-11-04 ASSESSMENT — PAIN DESCRIPTION - LOCATION
LOCATION: ANKLE
LOCATION: ANKLE

## 2022-11-04 ASSESSMENT — PAIN DESCRIPTION - PAIN TYPE: TYPE: ACUTE PAIN

## 2022-11-04 ASSESSMENT — PAIN - FUNCTIONAL ASSESSMENT: PAIN_FUNCTIONAL_ASSESSMENT: 0-10

## 2022-11-04 NOTE — ED PROVIDER NOTES
HPI:  11/4/22, Time: 6:15 PM EDT           Idris East is a 74 y.o. female presenting to the ED for RT ankle pain swelling , beginning several hours ago. The complaint has been persistent, moderate in severity, and worsened by standing. Patient stated she rolled her right ankle on the edge of her driveway about an hour prior to arrival.  Complains of pain to the lateral aspect of the right ankle. She inverted foot. Pain level is a 6 out of 10. Described as sharp and aching. Does not radiate. No previous injuries to the ankle. She has no pain to the knee or hip. Review of Systems:   A complete review of systems was performed and pertinent positives and negatives are stated within HPI, all other systems reviewed and are negative.    --------------------------------------------- PAST HISTORY ---------------------------------------------  Past Medical History:  has a past medical history of Anxiety, Depression, and Sjogren's disease ([address] Past Surgical History:  has a past surgical history that includes Dilation and curettage of uterus (1984); Cataract removal (Left); Shoulder arthroscopy (Right, 04/29/2015); and Total hip arthroplasty (Right, 3/9/2021). Social History:  reports that she has never smoked. She has never used smokeless tobacco. She reports that she does not drink alcohol and does not use drugs. Family History: family history includes Cancer in her brother and father; Liver Disease in her mother. The patients home medications have been reviewed. Allergies: Bee venom, Iodine, and Pcn [penicillins]    -------------------------------------------------- RESULTS -------------------------------------------------  All laboratory and radiology results have been personally reviewed by myself   LABS:  No results found for this visit on 11/04/22.     RADIOLOGY:  Interpreted by Radiologist.  XR ANKLE RIGHT (MIN 3 VIEWS)   Final Result   Question avulsion injury, distal tip of the fibula/lateral malleolus and   adjacent soft tissue swelling. Please correlate with focal tenderness. ------------------------- NURSING NOTES AND VITALS REVIEWED ---------------------------   The nursing notes within the ED encounter and vital signs as below have been reviewed. /89   Pulse 70   Temp 97.9 °F (36.6 °C) (Temporal)   Resp 16   Ht 5' 4\" (1.626 m)   Wt 152 lb (68.9 kg)   SpO2 99%   BMI 26.09 kg/m²   Oxygen Saturation Interpretation: Normal      ---------------------------------------------------PHYSICAL EXAM--------------------------------------      Constitutional/General: Alert and oriented x3, well appearing, non toxic in NAD  Head: Normocephalic and atraumatic  Eyes: PERRL, EOMI  Mouth: Oropharynx clear, handling secretions, no trismus  Neck: Supple, full ROM,   Pulmonary: Lungs clear to auscultation bilaterally, no wheezes, rales, or rhonchi. Not in respiratory distress  Cardiovascular:  Regular rate and rhythm, no murmurs, gallops, or rubs. 2+ distal pulses, patient has palpable pulses in the right foot. Extremities: Moves all extremities x 4. Warm and well perfused, patient has gross swelling and tenderness to palpation to the medial aspect of the right ankle. There is no tenderness to palpation to the lateral aspect of the foot. Achilles tendon is intact. There is no pain or tenderness or swelling noted to the midfoot. Skin: warm and dry without rash  Neurologic: GCS 15, focal deficits,  Psych: Normal Affect    ------------------------------ ED COURSE/MEDICAL DECISION MAKING----------------------  Medications   ibuprofen (ADVIL;MOTRIN) tablet 800 mg (800 mg Oral Given 11/4/22 1819)         ED COURSE:       Medical Decision Making:    Patient was given Motrin. She was sent for x-rays of the right ankle. X-rays of the right ankle demonstrate avulsion fracture at the tip of the fibula. There is also soft tissue swelling noted.   Patient was given Motrin for pain control. A ankle stirrup splint was applied. Patient is given crutches. Ice rest and follow-up with orthopedics. PROCEDURE NOTE  11/4/22       Time: 7911    SPLINT  APPLICATION  Risks, benefits and alternatives (for applicable procedures below) described. Performed By: EM Attending Physician. Indication:  fracture of Tip of fibula . Procedure:   A short  right leg  Lateral splint was applied by me. The patient tolerated the procedure well. Counseling: The emergency provider has spoken with the patient and discussed todays results, in addition to providing specific details for the plan of care and counseling regarding the diagnosis and prognosis. Questions are answered at this time and they are agreeable with the plan.      --------------------------------- IMPRESSION AND DISPOSITION ---------------------------------    IMPRESSION  1. Closed fracture of right ankle, initial encounter New Problem       DISPOSITION  Disposition: Discharge to home  Patient condition is stable      NOTE: This report was transcribed using voice recognition software.  Every effort was made to ensure accuracy; however, inadvertent computerized transcription errors may be present       Levis Aase, DO  11/04/22 1310 Anjana Charles, DO  11/20/22 [id number]

## 2022-11-08 SDOH — HEALTH STABILITY: PHYSICAL HEALTH: ON AVERAGE, HOW MANY MINUTES DO YOU ENGAGE IN EXERCISE AT THIS LEVEL?: 0 MIN

## 2022-11-08 SDOH — HEALTH STABILITY: PHYSICAL HEALTH: ON AVERAGE, HOW MANY DAYS PER WEEK DO YOU ENGAGE IN MODERATE TO STRENUOUS EXERCISE (LIKE A BRISK WALK)?: 0 DAYS

## 2022-11-08 ASSESSMENT — SOCIAL DETERMINANTS OF HEALTH (SDOH)
WITHIN THE LAST YEAR, HAVE YOU BEEN HUMILIATED OR EMOTIONALLY ABUSED IN OTHER WAYS BY YOUR PARTNER OR EX-PARTNER?: NO
WITHIN THE LAST YEAR, HAVE YOU BEEN AFRAID OF YOUR PARTNER OR EX-PARTNER?: NO
WITHIN THE LAST YEAR, HAVE TO BEEN RAPED OR FORCED TO HAVE ANY KIND OF SEXUAL ACTIVITY BY YOUR PARTNER OR EX-PARTNER?: NO
WITHIN THE LAST YEAR, HAVE YOU BEEN KICKED, HIT, SLAPPED, OR OTHERWISE PHYSICALLY HURT BY YOUR PARTNER OR EX-PARTNER?: NO

## 2022-11-09 ENCOUNTER — OFFICE VISIT (OUTPATIENT)
Dept: ORTHOPEDIC SURGERY | Age: 67
End: 2022-11-09
Payer: MEDICARE

## 2022-11-09 VITALS — BODY MASS INDEX: 25.95 KG/M2 | WEIGHT: 152 LBS | HEIGHT: 64 IN

## 2022-11-09 DIAGNOSIS — S93.401A SEVERE ANKLE SPRAIN, RIGHT, INITIAL ENCOUNTER: Primary | ICD-10-CM

## 2022-11-09 DIAGNOSIS — S99.911A ANKLE INJURY, RIGHT, INITIAL ENCOUNTER: ICD-10-CM

## 2022-11-09 PROCEDURE — G8417 CALC BMI ABV UP PARAM F/U: HCPCS | Performed by: STUDENT IN AN ORGANIZED HEALTH CARE EDUCATION/TRAINING PROGRAM

## 2022-11-09 PROCEDURE — G8427 DOCREV CUR MEDS BY ELIG CLIN: HCPCS | Performed by: STUDENT IN AN ORGANIZED HEALTH CARE EDUCATION/TRAINING PROGRAM

## 2022-11-09 PROCEDURE — 1090F PRES/ABSN URINE INCON ASSESS: CPT | Performed by: STUDENT IN AN ORGANIZED HEALTH CARE EDUCATION/TRAINING PROGRAM

## 2022-11-09 PROCEDURE — 1123F ACP DISCUSS/DSCN MKR DOCD: CPT | Performed by: STUDENT IN AN ORGANIZED HEALTH CARE EDUCATION/TRAINING PROGRAM

## 2022-11-09 PROCEDURE — 3017F COLORECTAL CA SCREEN DOC REV: CPT | Performed by: STUDENT IN AN ORGANIZED HEALTH CARE EDUCATION/TRAINING PROGRAM

## 2022-11-09 PROCEDURE — G8400 PT W/DXA NO RESULTS DOC: HCPCS | Performed by: STUDENT IN AN ORGANIZED HEALTH CARE EDUCATION/TRAINING PROGRAM

## 2022-11-09 PROCEDURE — 99214 OFFICE O/P EST MOD 30 MIN: CPT | Performed by: STUDENT IN AN ORGANIZED HEALTH CARE EDUCATION/TRAINING PROGRAM

## 2022-11-09 PROCEDURE — G8484 FLU IMMUNIZE NO ADMIN: HCPCS | Performed by: STUDENT IN AN ORGANIZED HEALTH CARE EDUCATION/TRAINING PROGRAM

## 2022-11-09 PROCEDURE — 1036F TOBACCO NON-USER: CPT | Performed by: STUDENT IN AN ORGANIZED HEALTH CARE EDUCATION/TRAINING PROGRAM

## 2022-11-09 RX ORDER — IBUPROFEN 800 MG/1
TABLET ORAL
COMMUNITY
Start: 2022-10-03

## 2022-11-09 RX ORDER — SODIUM FLUORIDE 6.1 MG/ML
GEL, DENTIFRICE DENTAL
COMMUNITY
Start: 2022-10-03

## 2022-11-09 NOTE — PROGRESS NOTES
New Ankle Patient     Referring Provider: No referring provider defined for this encounter. CHIEF COMPLAINT:   Chief Complaint   Patient presents with    Ankle Injury     Rt ankle injury 11/4/2022. Slipped off edge of driveway twisting right ankle as she fell to ground. States no injury to Rt [address] [facility] [address] 3/9/2021 RJB. Seen in [address] [facility] ER 11/4/2022, X-rays done. Given aircast.          HPI:    Matty Melchor is a 74y.o. year old female who sustained an inversion injury to her ankle on 11/4/2022 in her driveway. She had immediate pain and swelling in her ankle and went to the emergency department. She is having difficulty bearing weight. At that point she was diagnosed with a avulsion fracture off the lateral fibula. She was placed in an Aircast and given crutches. The pain is sharp and located in her lateral ankle. Denies fever chills. Denies numbness tingling. Pain is nonradiating. Is 8 out of 10. PAST MEDICAL HISTORY  Past Medical History:   Diagnosis Date    Anxiety     Depression     Sjogren's disease ([address]        PAST SURGICAL HISTORY  Past Surgical History:   Procedure Laterality Date    CATARACT REMOVAL Left     DILATION AND CURETTAGE OF UTERUS  1984    SHOULDER ARTHROSCOPY Right 04/29/2015    Excision loose bodies, Acromioplasty, Roxana resection distal clavicle  R.  Maria Luisa Yeung MD    TOTAL HIP ARTHROPLASTY Right 3/9/2021    RIGHT HIP TOTAL ARTHROPLASTY--ZAYDA performed by Good Morales MD at [address] HISTORY   Family History   Problem Relation Age of Onset    Liver Disease Mother     Cancer Father         lung liver cancer major smoker    Cancer Brother         pancreatic cancer       SOCIAL HISTORY  Social History     Socioeconomic History    Marital status:      Spouse name: Not on file    Number of children: Not on file    Years of education: Not on file    Highest education level: Not on file   Occupational History    Not on file   Tobacco Use    Smoking status: Never Smokeless tobacco: Never   Substance and Sexual Activity    Alcohol use: No    Drug use: No    Sexual activity: Yes     Partners: Male   Other Topics Concern    Not on file   Social History Narrative    Not on file     Social Determinants of Health     Financial Resource Strain: Not on file   Food Insecurity: Not on file   Transportation Needs: Not on file   Physical Activity: Inactive    Days of Exercise per Week: 0 days    Minutes of Exercise per Session: 0 min   Stress: Not on file   Social Connections: Not on file   Intimate Partner Violence: Not At Risk    Fear of Current or Ex-Partner: No    Emotionally Abused: No    Physically Abused: No    Sexually Abused: No   Housing Stability: Not on file     Social History     Occupational History    Not on file   Tobacco Use    Smoking status: Never    Smokeless tobacco: Never   Substance and Sexual Activity    Alcohol use: No    Drug use: No    Sexual activity: Yes     Partners: Male       CURRENT MEDICATIONS     Current Outpatient Medications:     ibuprofen (ADVIL;MOTRIN) 800 MG tablet, TAKE 1 TABLET BY MOUTH EVERY 6 TO 8 HOURS AS NEEDED, Disp: , Rfl:     PREVIDENT 5000 DRY MOUTH 1.1 % GEL, APPLY FOR 30 SECONDS AT BEDTIME AFTER BRUSHING AND FLOSSING, Disp: , Rfl:     Cholecalciferol (VITAMIN D3) 25 MCG (1000 UT) CAPS, Take 2 capsules by mouth daily , Disp: , Rfl:     venlafaxine (EFFEXOR) 37.5 MG tablet, Take 37.5 mg by mouth daily, Disp: , Rfl:     NALTREXONE HCL PO, Take 3 mg by mouth nightly , Disp: , Rfl:     Artificial Saliva (XEROSTOMIA RELIEF SPRAY) SOLN, Take 1 spray by mouth 2 times daily as needed (dry mouth), Disp: 10 mL, Rfl: 3    cevimeline (EVOXAC) 30 MG capsule, Take 1 capsule by mouth 3 times daily, Disp: 90 capsule, Rfl: 3    Estradiol (VAGIFEM) 10 MCG TABS vaginal tablet, Insert 1 tablet twice a week, Disp: , Rfl:     ALLERGIES  Allergies   Allergen Reactions    Bee Venom Swelling     Does not have epi pen    Iodine      Swelling around the vaginal area    Pcn [Penicillins]        Controlled Substances Monitoring:        REVIEW OF SYSTEMS:     Constitutional:  Negative for weight loss, fevers, chills, fatigue  Cardiovascular: Negative for chest pain, palpitations  Pulmonary: Negative for shortness of breath, labored breathing, cough  GI: negative for abdominal pain, nausea, vomitting   MSK: per HPI  Skin: negative for rash, open wounds    All other systems reviewed and are negative       PHYSICAL EXAM     Vitals:    11/09/22 0934   Weight: 152 lb (68.9 kg)   Height: 5' 4\" (1.626 m)       Height: 5' 4\" (1.626 m)  Weight: [unfilled]  BMI:  Body mass index is 26.09 kg/m². General: The patient is alert and oriented x 3, appears to be stated age and in no distress. HEENT: head is normocephalic, atraumatic. EOMI. Neck: supple, trachea midline, no thyromegaly   Cardiovascular: peripheral pulses palpable. Normal Capillary refill   Respiratory: breathing unlabored, chest expansion symmetric   Skin: no rash, no open wounds, no erythema  Psych: normal affect; mood stable  Neurologic: gait normal, sensation grossly intact in extremities  MSK:    Ankle:   Right ankle: Significant swelling and ecchymosis anterolateral.  Tender to palpation over anterolateral ankle ligaments. No tenderness medially. She does have guarded range of motion ankle plantar and dorsiflexion but is intact with 5-5 strength. No tenderness proximally in her calf or proximal tibiofibular joint. Ankle appears stable to anterior drawer testing. Sensation is intact light touch sural saphenous superficial peroneal deep peroneal and tibial nerve distribution. Foot is warm and well-perfused     IMAGING:    XR: To the right ankle reviewed demonstrate an avulsion fracture off the distal anterior fibula.   Ankle mortise is well-maintained without any evidence of dislocation    Imaging discussed with patient    ASSESSMENT  Right ankle ankle sprain, severe with avulsion off distal fibula    PLAN  -Plan discussed in detail with the patient. This represents a severe soft tissue injury to her right ankle. This anterolateral ankle ligament sprain. We do not recommend immobilization in a cam boot for the next 3 to 4 weeks. She can come out of the boot for range of motion exercises at home. She can be weightbearing as tolerated in the boot. Recommend ice and elevation along with anti-inflammatories for pain and swelling. After 4 weeks of immobilization we are going to start her in formal physical therapy program for ankle rehabilitation. All of her questions were answered in detail. She is happy with this plan.   We will see her back in 4 weeks        Alysha Thomason,   Orthopaedic Surgery   11/9/22   9:54 AM EST

## 2022-12-07 ENCOUNTER — OFFICE VISIT (OUTPATIENT)
Dept: ORTHOPEDIC SURGERY | Age: 67
End: 2022-12-07

## 2022-12-07 VITALS — WEIGHT: 152 LBS | BODY MASS INDEX: 25.95 KG/M2 | HEIGHT: 64 IN

## 2022-12-07 DIAGNOSIS — S82.51XP: Primary | ICD-10-CM

## 2022-12-07 NOTE — PATIENT INSTRUCTIONS
Ankle Sprain: Rehab Exercises  Introduction  Here are some examples of exercises for you to try. The exercises may be suggested for a condition or for rehabilitation. Start each exercise slowly. Ease off the exercises if you start to have pain. You will be told when to start these exercises and which ones will work best for you. How to do the exercises  'Alphabet' exercise  Trace the alphabet with your toe. This helps your ankle move in all directions. Side-to-side knee swing exercise  Sit in a chair with your foot flat on the floor. Slowly move your knee from side to side. Keep your foot pressed flat. Continue this exercise for 2 to 3 minutes. Towel curl  While sitting, place your foot on a towel on the floor. Scrunch the towel toward you with your toes. Then use your toes to push the towel away from you. To make this exercise more challenging you can put something on the other end of the towel. A can of soup is about the right weight for this. Towel stretch  Sit with your legs extended and knees straight. Place a towel around your foot just under the toes. Hold each end of the towel in each hand, with your hands above your knees. Pull back with the towel so that your foot stretches toward you. Hold the position for at least 15 to 30 seconds. Repeat 2 to 4 times a session. Do up to 5 sessions a day. Ankle eversion exercise  Start by sitting with your foot flat on the floor. Push your foot outward against a wall or a piece of furniture that doesn't move. Hold for about 6 seconds, and relax. Repeat 8 to 12 times. After you feel comfortable with this, try using rubber tubing looped around the outside of your feet for resistance. Push your foot out to the side against the tubing, and then count to 10 as you slowly bring your foot back to the middle. Repeat 8 to 12 times. Isometric opposition exercises  While sitting, put your feet together flat on the floor.   Press your injured foot inward against your other foot. Hold for about 6 seconds, and relax. Repeat 8 to 12 times. Then place the heel of your other foot on top of the injured one. Push down with the top heel while trying to push up with your injured foot. Hold for about 6 seconds, and relax. Repeat 8 to 12 times. Resisted ankle inversion  Sit on the floor with your good leg crossed over your other leg. Hold both ends of an exercise band and loop the band around the inside of your affected foot. Then press your other foot against the band. Keeping your legs crossed, slowly push your affected foot against the band so that foot moves away from your other foot. Then slowly relax. Repeat 8 to 12 times. Resisted ankle eversion  Sit on the floor with your legs straight. Hold both ends of an exercise band and loop the band around the outside of your affected foot. Then press your other foot against the band. Keeping your leg straight, slowly push your affected foot outward against the band and away from your other foot without letting your leg rotate. Then slowly relax. Repeat 8 to 12 times. Resisted ankle dorsiflexion  Tie the ends of an exercise band together to form a loop. Attach one end of the loop to a secure object or shut a door on it to hold it in place. (Or you can have someone hold one end of the loop to provide resistance.)  While sitting on the floor or in a chair, loop the other end of the band over the top of your affected foot. Keeping your knee and leg straight, slowly flex your foot to pull back on the exercise band, and then slowly relax. Repeat 8 to 12 times. Single-leg balance  Stand on a flat surface with your arms stretched out to your sides like you are making the letter \"T. \" Then lift your good leg off the floor, bending it at the knee. If you are not steady on your feet, use one hand to hold on to a chair, counter, or wall. Standing on the leg with your affected ankle, keep that knee straight.  Try to balance on that leg for up to 30 seconds. Then rest for up to 10 seconds. Repeat 6 to 8 times. When you can balance on your affected leg for 30 seconds with your eyes open, try to balance on it with your eyes closed. When you can do this exercise with your eyes closed for 30 seconds and with ease and no pain, try standing on a pillow or piece of foam, and repeat steps 1 through 4. Follow-up care is a key part of your treatment and safety. Be sure to make and go to all appointments, and call your doctor if you are having problems. It's also a good idea to know your test results and keep a list of the medicines you take. Current as of: March 9, 2022               Content Version: 13.4  © 2006-2022 [employer]. Care instructions adapted under license by [facility] [place] ([facility]). If you have questions about a medical condition or this instruction, always ask your healthcare professional. [place] [address] any warranty or liability for your use of this information.

## 2022-12-07 NOTE — PROGRESS NOTES
Follow Up Visit     Marlee Flaherty returns today for follow up visit regarding her right ankle sprain. She is pain-free. She has been walking in a boot. She is happy with her results. Physical Exam:     Height: 5' 4\" (1.626 m), Weight: 152 lb (68.9 kg)    General: Alert and oriented x3, no acute distress  Cardiovascular/pulmonary: No labored breathing, peripheral perfusion intact  Musculoskeletal:    Right ankle: Swelling has resolved. She is nontender over anterolateral ankle ligaments. Ankle is stable to varus valgus anterior drawer stress testing. Foot is warm and well-perfused. Sensation is intact light touch in sural saphenous superficial peroneal deep peroneal tibial nerve distribution    Controlled Substances Monitoring:      Imaging:  3 views of the right ankle reviewed today demonstrate that avulsion fracture of her distal fibula with good alignment of the mortise and no changes. Assessment: Right ankle sprain, healed      Plan:   She is healed up appropriately from her injury. She can discontinue the boot and transition to regular shoe. We will do our ankle home exercise program for strengthening and stability. She is at risk for reinjuring his ankle so she does take it easy over the next few weeks. She can go back into the boot if she starts to have pain with a regular shoe. All of her questions were answered in detail. She can follow-up as needed.       Gali Dietz, DO  Orthopaedic Surgery   12/7/22   11:30 AM EST

## 2023-04-11 DIAGNOSIS — M35.01 SJOGREN'S SYNDROME WITH KERATOCONJUNCTIVITIS SICCA (HCC): ICD-10-CM

## 2023-04-11 LAB
BACTERIA URNS QL MICRO: ABNORMAL /HPF
BASOPHILS # BLD: 0.02 E9/L (ref 0–0.2)
BASOPHILS NFR BLD: 0.4 % (ref 0–2)
BILIRUB UR QL STRIP: NEGATIVE
CLARITY UR: CLEAR
COLOR UR: YELLOW
CRP SERPL HS-MCNC: 0.3 MG/DL (ref 0–0.4)
EOSINOPHIL # BLD: 0.01 E9/L (ref 0.05–0.5)
EOSINOPHIL NFR BLD: 0.2 % (ref 0–6)
EPI CELLS #/AREA URNS HPF: ABNORMAL /HPF
ERYTHROCYTE [DISTWIDTH] IN BLOOD BY AUTOMATED COUNT: 14.6 FL (ref 11.5–15)
ERYTHROCYTE [SEDIMENTATION RATE] IN BLOOD BY WESTERGREN METHOD: 95 MM/HR (ref 0–20)
GLUCOSE UR STRIP-MCNC: NEGATIVE MG/DL
HCT VFR BLD AUTO: 40.5 % (ref 34–48)
HGB BLD-MCNC: 12.4 G/DL (ref 11.5–15.5)
HGB UR QL STRIP: NEGATIVE
IMM GRANULOCYTES # BLD: 0.02 E9/L
IMM GRANULOCYTES NFR BLD: 0.4 % (ref 0–5)
KETONES UR STRIP-MCNC: NEGATIVE MG/DL
LEUKOCYTE ESTERASE UR QL STRIP: ABNORMAL
LYMPHOCYTES # BLD: 1.19 E9/L (ref 1.5–4)
LYMPHOCYTES NFR BLD: 24.2 % (ref 20–42)
MCH RBC QN AUTO: 28.6 PG (ref 26–35)
MCHC RBC AUTO-ENTMCNC: 30.6 % (ref 32–34.5)
MCV RBC AUTO: 93.3 FL (ref 80–99.9)
MONOCYTES # BLD: 0.5 E9/L (ref 0.1–0.95)
MONOCYTES NFR BLD: 10.2 % (ref 2–12)
NEUTROPHILS # BLD: 3.17 E9/L (ref 1.8–7.3)
NEUTS SEG NFR BLD: 64.6 % (ref 43–80)
NITRITE UR QL STRIP: NEGATIVE
PH UR STRIP: 7 [PH] (ref 5–9)
PLATELET # BLD AUTO: 226 E9/L (ref 130–450)
PMV BLD AUTO: 12 FL (ref 7–12)
PROT UR STRIP-MCNC: NEGATIVE MG/DL
RBC # BLD AUTO: 4.34 E12/L (ref 3.5–5.5)
RBC #/AREA URNS HPF: ABNORMAL /HPF (ref 0–2)
RHEUMATOID FACT SER NEPH-ACNC: 55 IU/ML (ref 0–13)
SP GR UR STRIP: 1.01 (ref 1–1.03)
UROBILINOGEN UR STRIP-ACNC: 0.2 E.U./DL
WBC # BLD: 4.9 E9/L (ref 4.5–11.5)
WBC #/AREA URNS HPF: ABNORMAL /HPF (ref 0–5)

## 2023-04-12 LAB
ALBUMIN SERPL-MCNC: 3.5 G/DL (ref 3.5–4.7)
ALBUMIN SERPL-MCNC: 3.9 G/DL (ref 3.5–5.2)
ALP SERPL-CCNC: 93 U/L (ref 35–104)
ALPHA1 GLOB SERPL ELPH-MCNC: 0.3 G/DL (ref 0.2–0.4)
ALPHA2 GLOB SERPL ELPH-MCNC: 0.7 G/DL (ref 0.5–1)
ALT SERPL-CCNC: 14 U/L (ref 0–32)
ANION GAP SERPL CALCULATED.3IONS-SCNC: 7 MMOL/L (ref 7–16)
AST SERPL-CCNC: 34 U/L (ref 0–31)
B-GLOBULIN SERPL ELPH-MCNC: 1.2 G/DL (ref 0.8–1.3)
BILIRUB SERPL-MCNC: 0.3 MG/DL (ref 0–1.2)
BUN SERPL-MCNC: 12 MG/DL (ref 6–23)
CALCIUM SERPL-MCNC: 9.5 MG/DL (ref 8.6–10.2)
CHLORIDE SERPL-SCNC: 102 MMOL/L (ref 98–107)
CO2 SERPL-SCNC: 29 MMOL/L (ref 22–29)
CREAT SERPL-MCNC: 0.8 MG/DL (ref 0.5–1)
ELECTROPHORESIS: ABNORMAL
GAMMA GLOB SERPL ELPH-MCNC: 3.5 G/DL (ref 0.7–1.6)
GLUCOSE SERPL-MCNC: 79 MG/DL (ref 74–99)
POTASSIUM SERPL-SCNC: 5.1 MMOL/L (ref 3.5–5)
PROT SERPL-MCNC: 9.2 G/DL (ref 6.4–8.3)
SODIUM SERPL-SCNC: 138 MMOL/L (ref 132–146)

## 2023-04-27 ENCOUNTER — HOSPITAL ENCOUNTER (OUTPATIENT)
Dept: MAMMOGRAPHY | Age: 68
Discharge: HOME | End: 2023-04-29
Payer: MEDICARE

## 2023-04-27 DIAGNOSIS — M35.01 SJOGREN'S SYNDROME WITH KERATOCONJUNCTIVITIS SICCA (HCC): ICD-10-CM

## 2023-04-27 DIAGNOSIS — Z00.00 HEALTHCARE MAINTENANCE: ICD-10-CM

## 2023-04-27 DIAGNOSIS — M85.862 OTHER SPECIFIED DISORDERS OF BONE DENSITY AND STRUCTURE, LEFT LOWER LEG: ICD-10-CM

## 2023-04-27 PROCEDURE — 77080 DXA BONE DENSITY AXIAL: CPT

## 2023-06-02 NOTE — PROGRESS NOTES
Chief Complaint:   Chief Complaint   Patient presents with    Hip Pain     FU Rt hip pain. Discuss surgery and timing. HPI   61-year-old woman with known severe osteoarthritis right hip. Seen several months ago with right hip and groin pain refractory to conservative measures including medications and physical therapy. She has been deferring consideration of hip replacement due to her 's recent kidney transplant but he is now doing well. She is ready to plan right total hip arthroplasty. Patient Active Problem List   Diagnosis    Acute febrile illness    [facility]ogren's disease ([facility] [address]    Anxiety and depression       Past Medical History:   Diagnosis Date    Anxiety     Depression     [facility]ogren's disease ([facility] [address]        Past Surgical History:   Procedure Laterality Date    CATARACT REMOVAL Left     DILATION AND CURETTAGE OF UTERUS  1984    SHOULDER ARTHROSCOPY Right 04/29/2015    Excision loose bodies, Acromioplasty, Roxana resection distal clavicle  KENDRA Love MD       Current Outpatient Medications   Medication Sig Dispense Refill    Cholecalciferol (VITAMIN D3) 25 MCG (1000 UT) CAPS Take 1 capsule by mouth 2 times daily      acetaminophen (TYLENOL) 500 MG tablet Take 1,000 mg by mouth every 6 hours as needed for Pain      Probiotic Product (PROBIOTIC DAILY) CAPS Take 1 capsule by mouth daily      venlafaxine (EFFEXOR) 37.5 MG tablet Take 37.5 mg by mouth daily      diclofenac (VOLTAREN) 75 MG EC tablet Take 1 tablet by mouth 2 times daily 60 tablet 3    NALTREXONE HCL PO Take 3 mg by mouth daily       No current facility-administered medications for this visit.         Allergies   Allergen Reactions    Bee Venom Swelling     Does not have epi pen    Pcn [Penicillins]        Social History     Socioeconomic History    Marital status:      Spouse name: None    Number of children: None    Years of education: None    Highest education level: None   Occupational History    None   Social Needs    Financial resource strain: None    Food insecurity     Worry: None     Inability: None    Transportation needs     Medical: None     Non-medical: None   Tobacco Use    Smoking status: Never Smoker    Smokeless tobacco: Never Used   Substance and Sexual Activity    Alcohol use: No    Drug use: No    Sexual activity: Yes     Partners: Male   Lifestyle    Physical activity     Days per week: None     Minutes per session: None    Stress: None   Relationships    Social connections     Talks on phone: None     Gets together: None     Attends Church service: None     Active member of club or organization: None     Attends meetings of clubs or organizations: None     Relationship status: None    Intimate partner violence     Fear of current or ex partner: None     Emotionally abused: None     Physically abused: None     Forced sexual activity: None   Other Topics Concern    None   Social History Narrative    None       Family History   Problem Relation Age of Onset    Liver Disease Mother     Cancer Father         lung liver cancer major smoker    Cancer Brother         pancreatic cancer         Review of Systems   No fever, chills, or other constitutionalsymptoms. No numbness or other neuro symptoms. No chest pain. No dyspnea. [unfilled]   Slight antalgic gait favoring right hip. Lower extremity exam demonstrates 5 mm shortening right hip versus the left. There is full rotation of left hip without pain. Right hip and groin pain immediately produced on internal rotation right hip past neutral limited to about 30 degrees internal rotation. Distal neurologic and vascular grossly intact. Physical Exam    Patient is alert and oriented. Well-developed well-nourished. BMI 28. Pupils equal and reactive. Scleraeanicteric. Neck supple  Lungs clear. Cardiac rate and rhythm regular. Abdomen soft and nontender. Skin warm and dry.        XRAY: Recent hip x-rays reviewed AP pelvis with AP and lateral views right hip. There are bilateral degenerative changes left hip is moderate right more advanced. In particular the lateral view of the right hip shows stage IV medial compartment joint space loss. Impression: Severe osteoarthritic changes right hip. ASSESSMENT/PLAN:    Smooth Castro was seen today for hip pain. Diagnoses and all orders for this visit:    Hip pain, chronic, right    We had a lengthy discussion with the patient and her  today regarding total hip arthroplasty recovery time risks and limitations. Timing issues also reviewed. At this point she wants to schedule right total hip arthroplasty for March 16. Extensive preoperative teaching given today. The procedure, indications, risks, limitations and recovery time were all reviewed with patient, who requests to proceed. Return for TBA postop right total hip, exam and xray.        Araseli Malin MD    2/8/2021  9:18 AM

## 2023-07-12 ENCOUNTER — OFFICE VISIT (OUTPATIENT)
Dept: ORTHOPEDIC SURGERY | Age: 68
End: 2023-07-12

## 2023-07-12 ENCOUNTER — PREP FOR PROCEDURE (OUTPATIENT)
Dept: ORTHOPEDIC SURGERY | Age: 68
End: 2023-07-12

## 2023-07-12 VITALS — BODY MASS INDEX: 26.46 KG/M2 | WEIGHT: 155 LBS | HEIGHT: 64 IN

## 2023-07-12 DIAGNOSIS — M25.552 LEFT HIP PAIN: Primary | ICD-10-CM

## 2023-07-12 PROBLEM — M16.12 PRIMARY OSTEOARTHRITIS OF LEFT HIP: Status: ACTIVE | Noted: 2023-07-12

## 2023-07-12 RX ORDER — KETOCONAZOLE 20 MG/G
CREAM TOPICAL
COMMUNITY
Start: 2023-04-26

## 2023-07-12 NOTE — PROGRESS NOTES
New Hip Patient     Referring Provider:   No referring provider defined for this encounter. CHIEF COMPLAINT:   Chief Complaint   Patient presents with    Hip Pain     Left hip pain. Hx Rt COLLINS 3/21/2021        HPI:    Ariane Ivy is a 79y.o. year old female with left hip arthritis. She had a right total hip arthroplasty by Dr. Elaine Nathan in 2021. She recovered well from that. She is having significant activity related groin pain that interferes with activities of daily living. She is at the end of the road and hoping to move forward with hip replacement surgery. Denies fever chills. Denies numbness tingling. Denies any recent injuries. PAST MEDICAL HISTORY  Past Medical History:   Diagnosis Date    Anxiety     Depression     Sjogren's disease ([address])        PAST SURGICAL HISTORY  Past Surgical History:   Procedure Laterality Date    CATARACT REMOVAL Left     DILATION AND CURETTAGE OF UTERUS  1984    SHOULDER ARTHROSCOPY Right 04/29/2015    Excision loose bodies, Acromioplasty, Roxana resection distal clavicle  KENDRA Nathan MD    TOTAL HIP ARTHROPLASTY Right 3/9/2021    RIGHT HIP TOTAL ARTHROPLASTY--ZAYDA performed by Man Buck MD at [address] [place] [address]   Family History   Problem Relation Age of Onset    Liver Disease Mother     Cancer Father         lung liver cancer major smoker    Cancer Brother         pancreatic cancer       SOCIAL HISTORY  Social History     Occupational History    Not on file   Tobacco Use    Smoking status: Never    Smokeless tobacco: Never   Substance and Sexual Activity    Alcohol use: No    Drug use: No    Sexual activity: Yes     Partners: Male       CURRENT MEDICATIONS     Current Outpatient Medications:     ketoconazole (NIZORAL) 2 % cream, APPLY TOPICALLY TO FEET TWICE DAILY, Disp: , Rfl:     ibuprofen (ADVIL;MOTRIN) 800 MG tablet, TAKE 1 TABLET BY MOUTH EVERY 6 TO 8 HOURS AS NEEDED, Disp: , Rfl:     Artificial Saliva (XEROSTOMIA RELIEF SPRAY) SOLN,

## 2023-07-17 ASSESSMENT — PROMIS GLOBAL HEALTH SCALE
TO WHAT EXTENT ARE YOU ABLE TO CARRY OUT YOUR EVERYDAY PHYSICAL ACTIVITIES SUCH AS WALKING, CLIMBING STAIRS, CARRYING GROCERIES, OR MOVING A CHAIR [ON A SCALE OF 1 (NOT AT ALL) TO 5 (COMPLETELY)]?: 3
IN THE PAST 7 DAYS, HOW OFTEN HAVE YOU BEEN BOTHERED BY EMOTIONAL PROBLEMS, SUCH AS FEELING ANXIOUS, DEPRESSED, OR IRRITABLE [ON A SCALE FROM 1 (NEVER) TO 5 (ALWAYS)]?: 4
SUM OF RESPONSES TO QUESTIONS 2, 4, 5, & 10: 18
SUM OF RESPONSES TO QUESTIONS 3, 6, 7, & 8: 18
IN GENERAL, PLEASE RATE HOW WELL YOU CARRY OUT YOUR USUAL SOCIAL ACTIVITIES (INCLUDES ACTIVITIES AT HOME, AT WORK, AND IN YOUR COMMUNITY, AND RESPONSIBILITIES AS A PARENT, CHILD, SPOUSE, EMPLOYEE, FRIEND, ETC) [ON A SCALE OF 1 (POOR) TO 5 (EXCELLENT)]?: 3
IN GENERAL, WOULD YOU SAY YOUR HEALTH IS...[ON A SCALE OF 1 (POOR) TO 5 (EXCELLENT)]: 5
WHO IS THE PERSON COMPLETING THE PROMIS V1.1 SURVEY?: 0
IN GENERAL, HOW WOULD YOU RATE YOUR PHYSICAL HEALTH [ON A SCALE OF 1 (POOR) TO 5 (EXCELLENT)]?: 5
IN THE PAST 7 DAYS, HOW WOULD YOU RATE YOUR FATIGUE ON AVERAGE [ON A SCALE FROM 1 (NONE) TO 5 (VERY SEVERE)]?: 4
HOW IS THE PROMIS V1.1 BEING ADMINISTERED?: 2
IN GENERAL, WOULD YOU SAY YOUR QUALITY OF LIFE IS...[ON A SCALE OF 1 (POOR) TO 5 (EXCELLENT)]: 5
IN THE PAST 7 DAYS, HOW WOULD YOU RATE YOUR PAIN ON AVERAGE [ON A SCALE FROM 0 (NO PAIN) TO 10 (WORST IMAGINABLE PAIN)]?: 6
IN GENERAL, HOW WOULD YOU RATE YOUR SATISFACTION WITH YOUR SOCIAL ACTIVITIES AND RELATIONSHIPS [ON A SCALE OF 1 (POOR) TO 5 (EXCELLENT)]?: 5
IN GENERAL, HOW WOULD YOU RATE YOUR MENTAL HEALTH, INCLUDING YOUR MOOD AND YOUR ABILITY TO THINK [ON A SCALE OF 1 (POOR) TO 5 (EXCELLENT)]?: 4

## 2023-07-17 ASSESSMENT — HOOS JR
RISING FROM SITTING: 3
HOOS JR RAW SCORE: 15
HOOS JR TOTAL INTERVAL SCORE: 43.335
SITTING: 2
WALKING ON UNEVEN SURFACE: 2
GOING UP OR DOWN STAIRS: 2
BENDING TO THE FLOOR TO PICK UP OBJECT: 3
HOOS JR RAW SCORE: 15
LYING IN BED (TURNING OVER, MAINTAINING HIP POSITION): 3

## 2023-07-26 ENCOUNTER — HOSPITAL ENCOUNTER (OUTPATIENT)
Dept: PREADMISSION TESTING | Age: 68
Discharge: HOME | End: 2023-07-26
Payer: MEDICARE

## 2023-07-26 ENCOUNTER — TELEPHONE (OUTPATIENT)
Dept: ORTHOPEDIC SURGERY | Age: 68
End: 2023-07-26

## 2023-07-26 VITALS
DIASTOLIC BLOOD PRESSURE: 58 MMHG | HEIGHT: 64 IN | BODY MASS INDEX: 27.31 KG/M2 | SYSTOLIC BLOOD PRESSURE: 125 MMHG | WEIGHT: 160 LBS | OXYGEN SATURATION: 99 % | RESPIRATION RATE: 18 BRPM | TEMPERATURE: 98 F | HEART RATE: 58 BPM

## 2023-07-26 DIAGNOSIS — M16.12 PRIMARY OSTEOARTHRITIS OF LEFT HIP: ICD-10-CM

## 2023-07-26 LAB — PREALB SERPL-MCNC: 21 MG/DL (ref 20–40)

## 2023-07-26 PROCEDURE — 84134 ASSAY OF PREALBUMIN: CPT

## 2023-07-26 PROCEDURE — 36415 COLL VENOUS BLD VENIPUNCTURE: CPT

## 2023-07-26 ASSESSMENT — HOOS JR
RISING FROM SITTING: 4
HOOS JR RAW SCORE: 20
WALKING ON UNEVEN SURFACE: 4
HOOS JR TOTAL INTERVAL SCORE: 25.103
GOING UP OR DOWN STAIRS: 3
HOOS JR RAW SCORE: 20
BENDING TO THE FLOOR TO PICK UP OBJECT: 3
LYING IN BED (TURNING OVER, MAINTAINING HIP POSITION): 3
SITTING: 3

## 2023-07-26 NOTE — PROGRESS NOTES
[address] [email or web address] PRE-ADMISSION TESTING INSTRUCTIONS  Surgery Date 7-31-23   Arrival Time 5:00 a.m. The Preadmission Testing patient is instructed accordingly using the following criteria (check applicable):    ARRIVAL INSTRUCTIONS:  [x] Parking the day of Surgery is located in the Main Entrance lot. Upon entering the door, make an immediate right to the surgery reception desk    [x] Bring photo ID and insurance card    [] Bring in a copy of Living will or Durable Power of  papers. [] Please be sure to arrange for responsible adult to provide transportation to and from the hospital    [] Please arrange for responsible adult to be with you for the 24 hour period post procedure due to having anesthesia    [x] If you awake am of surgery not feeling well or have temperature >100 please call [phone]    GENERAL INSTRUCTIONS:    [x] Follow instructions for hydration that have been provided to you at your Pre-Admission Visit. Solid food until midnight then clear liquids. No gum, candy or mints. [x] You may brush your teeth, but do not swallow any water    [x] Take medications as instructed with 1-2 oz of water    [x] Stop herbal supplements and vitamins 5 days prior to procedure    [x] Follow preop dosing of blood thinners per physician instructions    [] Take 1/2 dose of evening insulin, but no insulin after midnight    [] No oral diabetic medications after midnight    [] If diabetic and have low blood sugar or feel symptomatic, take 1-2oz apple juice only    [] Bring inhalers day of surgery    [] Bring C-PAP/ Bi-Pap day of surgery    [] Bring urine specimen day of surgery    [] Shower or bath with soap, lather and rinse well, AM of Surgery, no lotion, powders or creams to surgical site    [] Follow bowel prep as instructed per surgeon    [x] No tobacco products within 24 hours of surgery     [x] No alcohol or illegal drug use within 24 hours of surgery.     [x] Jewelry, body

## 2023-07-28 ENCOUNTER — ANESTHESIA EVENT (OUTPATIENT)
Dept: OPERATING ROOM | Age: 68
End: 2023-07-28
Payer: MEDICARE

## 2023-07-31 ENCOUNTER — APPOINTMENT (OUTPATIENT)
Dept: GENERAL RADIOLOGY | Age: 68
End: 2023-07-31
Attending: STUDENT IN AN ORGANIZED HEALTH CARE EDUCATION/TRAINING PROGRAM
Payer: MEDICARE

## 2023-07-31 ENCOUNTER — ANESTHESIA (OUTPATIENT)
Dept: OPERATING ROOM | Age: 68
End: 2023-07-31
Payer: MEDICARE

## 2023-07-31 ENCOUNTER — HOSPITAL ENCOUNTER (OUTPATIENT)
Age: 68
Setting detail: OBSERVATION
Discharge: HOME | End: 2023-08-01
Attending: STUDENT IN AN ORGANIZED HEALTH CARE EDUCATION/TRAINING PROGRAM | Admitting: STUDENT IN AN ORGANIZED HEALTH CARE EDUCATION/TRAINING PROGRAM
Payer: MEDICARE

## 2023-07-31 DIAGNOSIS — M16.12 PRIMARY OSTEOARTHRITIS OF LEFT HIP: ICD-10-CM

## 2023-07-31 DIAGNOSIS — Z96.641 STATUS POST TOTAL HIP REPLACEMENT, RIGHT: Primary | ICD-10-CM

## 2023-07-31 DIAGNOSIS — Z96.642 S/P TOTAL LEFT HIP ARTHROPLASTY: ICD-10-CM

## 2023-07-31 PROCEDURE — 88304 TISSUE EXAM BY PATHOLOGIST: CPT

## 2023-07-31 PROCEDURE — 7100000000 HC PACU RECOVERY - FIRST 15 MIN: Performed by: STUDENT IN AN ORGANIZED HEALTH CARE EDUCATION/TRAINING PROGRAM

## 2023-07-31 PROCEDURE — 3600000003 HC SURGERY LEVEL 3 BASE: Performed by: STUDENT IN AN ORGANIZED HEALTH CARE EDUCATION/TRAINING PROGRAM

## 2023-07-31 PROCEDURE — 2500000003 HC RX 250 WO HCPCS

## 2023-07-31 PROCEDURE — 2580000003 HC RX 258

## 2023-07-31 PROCEDURE — 6360000002 HC RX W HCPCS: Performed by: STUDENT IN AN ORGANIZED HEALTH CARE EDUCATION/TRAINING PROGRAM

## 2023-07-31 PROCEDURE — C1776 JOINT DEVICE (IMPLANTABLE): HCPCS | Performed by: STUDENT IN AN ORGANIZED HEALTH CARE EDUCATION/TRAINING PROGRAM

## 2023-07-31 PROCEDURE — 3600000013 HC SURGERY LEVEL 3 ADDTL 15MIN: Performed by: STUDENT IN AN ORGANIZED HEALTH CARE EDUCATION/TRAINING PROGRAM

## 2023-07-31 PROCEDURE — 7100000001 HC PACU RECOVERY - ADDTL 15 MIN: Performed by: STUDENT IN AN ORGANIZED HEALTH CARE EDUCATION/TRAINING PROGRAM

## 2023-07-31 PROCEDURE — 3700000001 HC ADD 15 MINUTES (ANESTHESIA): Performed by: STUDENT IN AN ORGANIZED HEALTH CARE EDUCATION/TRAINING PROGRAM

## 2023-07-31 PROCEDURE — 6360000002 HC RX W HCPCS

## 2023-07-31 PROCEDURE — G0378 HOSPITAL OBSERVATION PER HR: HCPCS

## 2023-07-31 PROCEDURE — 2500000003 HC RX 250 WO HCPCS: Performed by: STUDENT IN AN ORGANIZED HEALTH CARE EDUCATION/TRAINING PROGRAM

## 2023-07-31 PROCEDURE — 6370000000 HC RX 637 (ALT 250 FOR IP): Performed by: STUDENT IN AN ORGANIZED HEALTH CARE EDUCATION/TRAINING PROGRAM

## 2023-07-31 PROCEDURE — 97165 OT EVAL LOW COMPLEX 30 MIN: CPT

## 2023-07-31 PROCEDURE — 2580000003 HC RX 258: Performed by: STUDENT IN AN ORGANIZED HEALTH CARE EDUCATION/TRAINING PROGRAM

## 2023-07-31 PROCEDURE — 73501 X-RAY EXAM HIP UNI 1 VIEW: CPT

## 2023-07-31 PROCEDURE — C9399 UNCLASSIFIED DRUGS OR BIOLOG: HCPCS

## 2023-07-31 PROCEDURE — 88311 DECALCIFY TISSUE: CPT

## 2023-07-31 PROCEDURE — 3700000000 HC ANESTHESIA ATTENDED CARE: Performed by: STUDENT IN AN ORGANIZED HEALTH CARE EDUCATION/TRAINING PROGRAM

## 2023-07-31 PROCEDURE — 2709999900 HC NON-CHARGEABLE SUPPLY: Performed by: STUDENT IN AN ORGANIZED HEALTH CARE EDUCATION/TRAINING PROGRAM

## 2023-07-31 PROCEDURE — 97530 THERAPEUTIC ACTIVITIES: CPT

## 2023-07-31 PROCEDURE — 97161 PT EVAL LOW COMPLEX 20 MIN: CPT

## 2023-07-31 DEVICE — CERAMIC V40 FEMORAL HEAD
Type: IMPLANTABLE DEVICE | Site: HIP | Status: FUNCTIONAL
Brand: BIOLOX

## 2023-07-31 DEVICE — SCREW BNE L25MM DIA6.5MM HEX LO PROF TRIDENT II: Type: IMPLANTABLE DEVICE | Site: HIP | Status: FUNCTIONAL

## 2023-07-31 DEVICE — 127 DEGREE NECK ANGLE HIP STEM
Type: IMPLANTABLE DEVICE | Site: HIP | Status: FUNCTIONAL
Brand: ACCOLADE

## 2023-07-31 DEVICE — TRIDENT X3 0 DEGREE POLYETHYLENE INSERT
Type: IMPLANTABLE DEVICE | Site: HIP | Status: FUNCTIONAL
Brand: TRIDENT X3 INSERT

## 2023-07-31 DEVICE — TRIDENT II TRITANIUM CLUSTER 54E
Type: IMPLANTABLE DEVICE | Site: HIP | Status: FUNCTIONAL
Brand: TRIDENT II

## 2023-07-31 DEVICE — SCREW BNE L30MM DIA6.5MM STD CANC HIP TI HMSPHR THRD DRVR: Type: IMPLANTABLE DEVICE | Site: HIP | Status: FUNCTIONAL

## 2023-07-31 RX ORDER — DIPHENHYDRAMINE HYDROCHLORIDE 50 MG/ML
INJECTION INTRAMUSCULAR; INTRAVENOUS PRN
Status: DISCONTINUED | OUTPATIENT
Start: 2023-07-31 | End: 2023-07-31 | Stop reason: SDUPTHER

## 2023-07-31 RX ORDER — SODIUM CHLORIDE 0.9 % (FLUSH) 0.9 %
5-40 SYRINGE (ML) INJECTION PRN
Status: DISCONTINUED | OUTPATIENT
Start: 2023-07-31 | End: 2023-08-01 | Stop reason: HOSPADM

## 2023-07-31 RX ORDER — SODIUM CHLORIDE 0.9 % (FLUSH) 0.9 %
5-40 SYRINGE (ML) INJECTION EVERY 12 HOURS SCHEDULED
Status: DISCONTINUED | OUTPATIENT
Start: 2023-07-31 | End: 2023-08-01 | Stop reason: HOSPADM

## 2023-07-31 RX ORDER — SODIUM CHLORIDE 0.9 % (FLUSH) 0.9 %
5-40 SYRINGE (ML) INJECTION EVERY 12 HOURS SCHEDULED
Status: DISCONTINUED | OUTPATIENT
Start: 2023-07-31 | End: 2023-07-31 | Stop reason: HOSPADM

## 2023-07-31 RX ORDER — SODIUM CHLORIDE 0.9 % (FLUSH) 0.9 %
5-40 SYRINGE (ML) INJECTION PRN
Status: DISCONTINUED | OUTPATIENT
Start: 2023-07-31 | End: 2023-07-31 | Stop reason: HOSPADM

## 2023-07-31 RX ORDER — KETAMINE HYDROCHLORIDE 10 MG/ML
INJECTION INTRAMUSCULAR; INTRAVENOUS PRN
Status: DISCONTINUED | OUTPATIENT
Start: 2023-07-31 | End: 2023-07-31 | Stop reason: SDUPTHER

## 2023-07-31 RX ORDER — VENLAFAXINE HYDROCHLORIDE 37.5 MG/1
37.5 CAPSULE, EXTENDED RELEASE ORAL DAILY
Status: DISCONTINUED | OUTPATIENT
Start: 2023-08-01 | End: 2023-08-01 | Stop reason: HOSPADM

## 2023-07-31 RX ORDER — PILOCARPINE HYDROCHLORIDE 5 MG/1
5 TABLET, FILM COATED ORAL 3 TIMES DAILY
Status: DISCONTINUED | OUTPATIENT
Start: 2023-07-31 | End: 2023-07-31

## 2023-07-31 RX ORDER — SODIUM CHLORIDE 9 MG/ML
INJECTION, SOLUTION INTRAVENOUS PRN
Status: DISCONTINUED | OUTPATIENT
Start: 2023-07-31 | End: 2023-07-31 | Stop reason: HOSPADM

## 2023-07-31 RX ORDER — PROCHLORPERAZINE EDISYLATE 5 MG/ML
5 INJECTION INTRAMUSCULAR; INTRAVENOUS
Status: DISCONTINUED | OUTPATIENT
Start: 2023-07-31 | End: 2023-07-31 | Stop reason: HOSPADM

## 2023-07-31 RX ORDER — OXYCODONE HYDROCHLORIDE 5 MG/1
5 TABLET ORAL EVERY 4 HOURS PRN
Status: DISCONTINUED | OUTPATIENT
Start: 2023-07-31 | End: 2023-08-01 | Stop reason: HOSPADM

## 2023-07-31 RX ORDER — CELECOXIB 100 MG/1
100 CAPSULE ORAL ONCE
Status: COMPLETED | OUTPATIENT
Start: 2023-07-31 | End: 2023-07-31

## 2023-07-31 RX ORDER — GLYCOPYRROLATE 0.2 MG/ML
INJECTION INTRAMUSCULAR; INTRAVENOUS PRN
Status: DISCONTINUED | OUTPATIENT
Start: 2023-07-31 | End: 2023-07-31 | Stop reason: SDUPTHER

## 2023-07-31 RX ORDER — BISACODYL 5 MG/1
5 TABLET, DELAYED RELEASE ORAL DAILY PRN
Status: DISCONTINUED | OUTPATIENT
Start: 2023-07-31 | End: 2023-08-01 | Stop reason: HOSPADM

## 2023-07-31 RX ORDER — ASPIRIN 325 MG
325 TABLET ORAL 2 TIMES DAILY
Qty: 60 TABLET | Refills: 0 | Status: SHIPPED | OUTPATIENT
Start: 2023-07-31 | End: 2023-08-30

## 2023-07-31 RX ORDER — MIDAZOLAM HYDROCHLORIDE 1 MG/ML
INJECTION INTRAMUSCULAR; INTRAVENOUS PRN
Status: DISCONTINUED | OUTPATIENT
Start: 2023-07-31 | End: 2023-07-31 | Stop reason: SDUPTHER

## 2023-07-31 RX ORDER — ACETAMINOPHEN 325 MG/1
650 TABLET ORAL EVERY 6 HOURS
Status: DISCONTINUED | OUTPATIENT
Start: 2023-07-31 | End: 2023-08-01 | Stop reason: HOSPADM

## 2023-07-31 RX ORDER — SODIUM CHLORIDE 9 MG/ML
INJECTION, SOLUTION INTRAVENOUS CONTINUOUS PRN
Status: DISCONTINUED | OUTPATIENT
Start: 2023-07-31 | End: 2023-07-31 | Stop reason: SDUPTHER

## 2023-07-31 RX ORDER — SODIUM CHLORIDE 9 MG/ML
INJECTION, SOLUTION INTRAVENOUS CONTINUOUS
Status: DISCONTINUED | OUTPATIENT
Start: 2023-07-31 | End: 2023-07-31

## 2023-07-31 RX ORDER — ROCURONIUM BROMIDE 10 MG/ML
INJECTION, SOLUTION INTRAVENOUS PRN
Status: DISCONTINUED | OUTPATIENT
Start: 2023-07-31 | End: 2023-07-31 | Stop reason: SDUPTHER

## 2023-07-31 RX ORDER — PROPOFOL 10 MG/ML
INJECTION, EMULSION INTRAVENOUS PRN
Status: DISCONTINUED | OUTPATIENT
Start: 2023-07-31 | End: 2023-07-31 | Stop reason: SDUPTHER

## 2023-07-31 RX ORDER — PHENYLEPHRINE HCL IN 0.9% NACL 1 MG/10 ML
SYRINGE (ML) INTRAVENOUS PRN
Status: DISCONTINUED | OUTPATIENT
Start: 2023-07-31 | End: 2023-07-31 | Stop reason: SDUPTHER

## 2023-07-31 RX ORDER — ASPIRIN 325 MG
325 TABLET, DELAYED RELEASE (ENTERIC COATED) ORAL 2 TIMES DAILY
Status: DISCONTINUED | OUTPATIENT
Start: 2023-07-31 | End: 2023-08-01 | Stop reason: HOSPADM

## 2023-07-31 RX ORDER — ONDANSETRON 4 MG/1
4 TABLET, ORALLY DISINTEGRATING ORAL EVERY 8 HOURS PRN
Status: DISCONTINUED | OUTPATIENT
Start: 2023-07-31 | End: 2023-08-01 | Stop reason: HOSPADM

## 2023-07-31 RX ORDER — OXYCODONE HYDROCHLORIDE AND ACETAMINOPHEN 5; 325 MG/1; MG/1
1 TABLET ORAL EVERY 6 HOURS PRN
Qty: 28 TABLET | Refills: 0 | Status: SHIPPED | OUTPATIENT
Start: 2023-07-31 | End: 2023-08-07

## 2023-07-31 RX ORDER — ONDANSETRON 2 MG/ML
INJECTION INTRAMUSCULAR; INTRAVENOUS PRN
Status: DISCONTINUED | OUTPATIENT
Start: 2023-07-31 | End: 2023-07-31 | Stop reason: SDUPTHER

## 2023-07-31 RX ORDER — ONDANSETRON 2 MG/ML
4 INJECTION INTRAMUSCULAR; INTRAVENOUS EVERY 6 HOURS PRN
Status: DISCONTINUED | OUTPATIENT
Start: 2023-07-31 | End: 2023-08-01 | Stop reason: HOSPADM

## 2023-07-31 RX ORDER — DEXAMETHASONE SODIUM PHOSPHATE 10 MG/ML
8 INJECTION, SOLUTION INTRAMUSCULAR; INTRAVENOUS ONCE
Status: DISCONTINUED | OUTPATIENT
Start: 2023-07-31 | End: 2023-07-31 | Stop reason: HOSPADM

## 2023-07-31 RX ORDER — SODIUM CHLORIDE 9 MG/ML
INJECTION, SOLUTION INTRAVENOUS PRN
Status: DISCONTINUED | OUTPATIENT
Start: 2023-07-31 | End: 2023-08-01 | Stop reason: HOSPADM

## 2023-07-31 RX ORDER — OXYCODONE HYDROCHLORIDE 5 MG/1
5 TABLET ORAL
Status: DISCONTINUED | OUTPATIENT
Start: 2023-07-31 | End: 2023-07-31 | Stop reason: HOSPADM

## 2023-07-31 RX ORDER — OXYCODONE HYDROCHLORIDE 5 MG/1
10 TABLET ORAL EVERY 4 HOURS PRN
Status: DISCONTINUED | OUTPATIENT
Start: 2023-07-31 | End: 2023-08-01 | Stop reason: HOSPADM

## 2023-07-31 RX ORDER — KETOROLAC TROMETHAMINE 30 MG/ML
15 INJECTION, SOLUTION INTRAMUSCULAR; INTRAVENOUS EVERY 6 HOURS
Status: DISCONTINUED | OUTPATIENT
Start: 2023-07-31 | End: 2023-08-01 | Stop reason: HOSPADM

## 2023-07-31 RX ORDER — DEXAMETHASONE SODIUM PHOSPHATE 4 MG/ML
INJECTION, SOLUTION INTRA-ARTICULAR; INTRALESIONAL; INTRAMUSCULAR; INTRAVENOUS; SOFT TISSUE PRN
Status: DISCONTINUED | OUTPATIENT
Start: 2023-07-31 | End: 2023-07-31 | Stop reason: SDUPTHER

## 2023-07-31 RX ORDER — ACETAMINOPHEN 500 MG
1000 TABLET ORAL ONCE
Status: COMPLETED | OUTPATIENT
Start: 2023-07-31 | End: 2023-07-31

## 2023-07-31 RX ORDER — MORPHINE SULFATE 4 MG/ML
4 INJECTION, SOLUTION INTRAMUSCULAR; INTRAVENOUS
Status: DISCONTINUED | OUTPATIENT
Start: 2023-07-31 | End: 2023-08-01 | Stop reason: HOSPADM

## 2023-07-31 RX ORDER — LIDOCAINE HYDROCHLORIDE 20 MG/ML
INJECTION, SOLUTION EPIDURAL; INFILTRATION; INTRACAUDAL; PERINEURAL PRN
Status: DISCONTINUED | OUTPATIENT
Start: 2023-07-31 | End: 2023-07-31 | Stop reason: SDUPTHER

## 2023-07-31 RX ORDER — KETOROLAC TROMETHAMINE 30 MG/ML
INJECTION, SOLUTION INTRAMUSCULAR; INTRAVENOUS PRN
Status: DISCONTINUED | OUTPATIENT
Start: 2023-07-31 | End: 2023-07-31 | Stop reason: SDUPTHER

## 2023-07-31 RX ORDER — VITAMIN B COMPLEX
2000 TABLET ORAL DAILY
Status: DISCONTINUED | OUTPATIENT
Start: 2023-07-31 | End: 2023-08-01 | Stop reason: HOSPADM

## 2023-07-31 RX ORDER — VENLAFAXINE 37.5 MG/1
37.5 TABLET ORAL DAILY
Status: DISCONTINUED | OUTPATIENT
Start: 2023-08-01 | End: 2023-07-31 | Stop reason: SDUPTHER

## 2023-07-31 RX ORDER — FENTANYL CITRATE 50 UG/ML
INJECTION, SOLUTION INTRAMUSCULAR; INTRAVENOUS PRN
Status: DISCONTINUED | OUTPATIENT
Start: 2023-07-31 | End: 2023-07-31 | Stop reason: SDUPTHER

## 2023-07-31 RX ORDER — FENTANYL CITRATE 50 UG/ML
50 INJECTION, SOLUTION INTRAMUSCULAR; INTRAVENOUS EVERY 5 MIN PRN
Status: DISCONTINUED | OUTPATIENT
Start: 2023-07-31 | End: 2023-07-31 | Stop reason: HOSPADM

## 2023-07-31 RX ORDER — VANCOMYCIN HYDROCHLORIDE 1 G/20ML
INJECTION, POWDER, LYOPHILIZED, FOR SOLUTION INTRAVENOUS PRN
Status: DISCONTINUED | OUTPATIENT
Start: 2023-07-31 | End: 2023-07-31 | Stop reason: ALTCHOICE

## 2023-07-31 RX ORDER — MORPHINE SULFATE 2 MG/ML
2 INJECTION, SOLUTION INTRAMUSCULAR; INTRAVENOUS
Status: DISCONTINUED | OUTPATIENT
Start: 2023-07-31 | End: 2023-08-01 | Stop reason: HOSPADM

## 2023-07-31 RX ADMIN — HYDROMORPHONE HYDROCHLORIDE 0.5 MG: 0.5 INJECTION, SOLUTION INTRAMUSCULAR; INTRAVENOUS; SUBCUTANEOUS at 09:45

## 2023-07-31 RX ADMIN — Medication 2000 UNITS: at 12:54

## 2023-07-31 RX ADMIN — ROCURONIUM BROMIDE 10 MG: 10 INJECTION, SOLUTION INTRAVENOUS at 08:22

## 2023-07-31 RX ADMIN — FENTANYL CITRATE 50 MCG: 50 INJECTION, SOLUTION INTRAMUSCULAR; INTRAVENOUS at 08:43

## 2023-07-31 RX ADMIN — KETAMINE HYDROCHLORIDE 25 MG: 10 INJECTION INTRAMUSCULAR; INTRAVENOUS at 07:29

## 2023-07-31 RX ADMIN — TRANEXAMIC ACID 1000 MG: 100 INJECTION, SOLUTION INTRAVENOUS at 07:36

## 2023-07-31 RX ADMIN — MIDAZOLAM 2 MG: 1 INJECTION INTRAMUSCULAR; INTRAVENOUS at 07:21

## 2023-07-31 RX ADMIN — HYDROMORPHONE HYDROCHLORIDE 0.5 MG: 0.5 INJECTION, SOLUTION INTRAMUSCULAR; INTRAVENOUS; SUBCUTANEOUS at 09:17

## 2023-07-31 RX ADMIN — ONDANSETRON 4 MG: 2 INJECTION INTRAMUSCULAR; INTRAVENOUS at 08:35

## 2023-07-31 RX ADMIN — ASPIRIN 325 MG: 325 TABLET, COATED ORAL at 12:54

## 2023-07-31 RX ADMIN — SODIUM CHLORIDE: 9 INJECTION, SOLUTION INTRAVENOUS at 08:01

## 2023-07-31 RX ADMIN — LIDOCAINE HYDROCHLORIDE 60 MG: 20 INJECTION, SOLUTION EPIDURAL; INFILTRATION; INTRACAUDAL; PERINEURAL at 07:29

## 2023-07-31 RX ADMIN — Medication 100 MCG: at 08:11

## 2023-07-31 RX ADMIN — HYDROMORPHONE HYDROCHLORIDE 0.5 MG: 0.5 INJECTION, SOLUTION INTRAMUSCULAR; INTRAVENOUS; SUBCUTANEOUS at 09:27

## 2023-07-31 RX ADMIN — GLYCOPYRROLATE 0.1 MG: 0.2 INJECTION INTRAMUSCULAR; INTRAVENOUS at 07:29

## 2023-07-31 RX ADMIN — KETOROLAC TROMETHAMINE 15 MG: 30 INJECTION, SOLUTION INTRAMUSCULAR; INTRAVENOUS at 15:59

## 2023-07-31 RX ADMIN — FENTANYL CITRATE 50 MCG: 50 INJECTION, SOLUTION INTRAMUSCULAR; INTRAVENOUS at 09:09

## 2023-07-31 RX ADMIN — FENTANYL CITRATE 50 MCG: 50 INJECTION, SOLUTION INTRAMUSCULAR; INTRAVENOUS at 09:37

## 2023-07-31 RX ADMIN — OXYCODONE HYDROCHLORIDE 5 MG: 5 TABLET ORAL at 21:22

## 2023-07-31 RX ADMIN — ACETAMINOPHEN 650 MG: 325 TABLET ORAL at 12:54

## 2023-07-31 RX ADMIN — FENTANYL CITRATE 50 MCG: 50 INJECTION, SOLUTION INTRAMUSCULAR; INTRAVENOUS at 08:23

## 2023-07-31 RX ADMIN — SODIUM CHLORIDE: 9 INJECTION, SOLUTION INTRAVENOUS at 07:23

## 2023-07-31 RX ADMIN — CELECOXIB 100 MG: 100 CAPSULE ORAL at 06:59

## 2023-07-31 RX ADMIN — WATER 2000 MG: 1 INJECTION INTRAMUSCULAR; INTRAVENOUS; SUBCUTANEOUS at 15:59

## 2023-07-31 RX ADMIN — ACETAMINOPHEN 650 MG: 325 TABLET ORAL at 18:47

## 2023-07-31 RX ADMIN — FENTANYL CITRATE 50 MCG: 50 INJECTION, SOLUTION INTRAMUSCULAR; INTRAVENOUS at 10:00

## 2023-07-31 RX ADMIN — DIPHENHYDRAMINE HYDROCHLORIDE 12.5 MG: 50 INJECTION, SOLUTION INTRAMUSCULAR; INTRAVENOUS at 07:44

## 2023-07-31 RX ADMIN — OXYCODONE HYDROCHLORIDE 5 MG: 5 TABLET ORAL at 16:34

## 2023-07-31 RX ADMIN — WATER 2000 MG: 1 INJECTION INTRAMUSCULAR; INTRAVENOUS; SUBCUTANEOUS at 07:38

## 2023-07-31 RX ADMIN — KETOROLAC TROMETHAMINE 30 MG: 30 INJECTION, SOLUTION INTRAMUSCULAR; INTRAVENOUS at 08:42

## 2023-07-31 RX ADMIN — FENTANYL CITRATE 50 MCG: 50 INJECTION, SOLUTION INTRAMUSCULAR; INTRAVENOUS at 07:29

## 2023-07-31 RX ADMIN — SUGAMMADEX 290 MG: 100 INJECTION, SOLUTION INTRAVENOUS at 09:00

## 2023-07-31 RX ADMIN — ROCURONIUM BROMIDE 40 MG: 10 INJECTION, SOLUTION INTRAVENOUS at 07:29

## 2023-07-31 RX ADMIN — ACETAMINOPHEN 1000 MG: 500 TABLET ORAL at 06:59

## 2023-07-31 RX ADMIN — TRANEXAMIC ACID 1000 MG: 100 INJECTION, SOLUTION INTRAVENOUS at 10:42

## 2023-07-31 RX ADMIN — FENTANYL CITRATE 50 MCG: 50 INJECTION, SOLUTION INTRAMUSCULAR; INTRAVENOUS at 09:02

## 2023-07-31 RX ADMIN — ASPIRIN 325 MG: 325 TABLET, COATED ORAL at 21:21

## 2023-07-31 RX ADMIN — DEXAMETHASONE SODIUM PHOSPHATE 8 MG: 4 INJECTION, SOLUTION INTRAMUSCULAR; INTRAVENOUS at 07:35

## 2023-07-31 RX ADMIN — KETOROLAC TROMETHAMINE 15 MG: 30 INJECTION, SOLUTION INTRAMUSCULAR; INTRAVENOUS at 21:21

## 2023-07-31 RX ADMIN — PROPOFOL 150 MG: 10 INJECTION, EMULSION INTRAVENOUS at 07:29

## 2023-07-31 RX ADMIN — OXYCODONE HYDROCHLORIDE 10 MG: 5 TABLET ORAL at 12:18

## 2023-07-31 RX ADMIN — SODIUM CHLORIDE, PRESERVATIVE FREE 10 ML: 5 INJECTION INTRAVENOUS at 21:22

## 2023-07-31 ASSESSMENT — PAIN SCALES - GENERAL
PAINLEVEL_OUTOF10: 9
PAINLEVEL_OUTOF10: 6
PAINLEVEL_OUTOF10: 4
PAINLEVEL_OUTOF10: 1
PAINLEVEL_OUTOF10: 9
PAINLEVEL_OUTOF10: 5
PAINLEVEL_OUTOF10: 7
PAINLEVEL_OUTOF10: 9
PAINLEVEL_OUTOF10: 9
PAINLEVEL_OUTOF10: 6
PAINLEVEL_OUTOF10: 9

## 2023-07-31 ASSESSMENT — PAIN DESCRIPTION - DESCRIPTORS
DESCRIPTORS: SORE;TENDER;ACHING
DESCRIPTORS: ACHING;DISCOMFORT;DULL
DESCRIPTORS: DISCOMFORT;SORE
DESCRIPTORS: ACHING
DESCRIPTORS: ACHING;DISCOMFORT

## 2023-07-31 ASSESSMENT — PAIN DESCRIPTION - LOCATION
LOCATION: HIP

## 2023-07-31 ASSESSMENT — PAIN DESCRIPTION - ORIENTATION
ORIENTATION: LEFT

## 2023-07-31 ASSESSMENT — PAIN - FUNCTIONAL ASSESSMENT
PAIN_FUNCTIONAL_ASSESSMENT: PREVENTS OR INTERFERES SOME ACTIVE ACTIVITIES AND ADLS
PAIN_FUNCTIONAL_ASSESSMENT: ACTIVITIES ARE NOT PREVENTED
PAIN_FUNCTIONAL_ASSESSMENT: PREVENTS OR INTERFERES SOME ACTIVE ACTIVITIES AND ADLS
PAIN_FUNCTIONAL_ASSESSMENT: 0-10

## 2023-07-31 NOTE — H&P
Department of Orthopedic Surgery  Attending H&P Note          HISTORY OF PRESENT ILLNESS:                The patient is a 79 y.o. female who presents with end-stage left hip arthritis. She is here today for elective left total hip arthroplasty. She has activity related groin pain that interferes with her life. She is identified and hoping to move forward with hip replacement surgery. Denies fever and chills. Denies numbness tingling. .    Past Medical History:        Diagnosis Date    Anxiety     Arthritis     Depression     Sjogren's disease ([address])        Past Surgical History:        Procedure Laterality Date    CATARACT REMOVAL Left     DILATION AND CURETTAGE OF UTERUS  1984    SHOULDER ARTHROSCOPY Right 04/29/2015    Excision loose bodies, Acromioplasty, Roxana resection distal clavicle  KENDRA Love MD    TOTAL HIP ARTHROPLASTY Right 3/9/2021    RIGHT HIP TOTAL ARTHROPLASTY--ZAYDA performed by Hung Ulrich MD at [facility]       Current Medications:   Current Facility-Administered Medications: dexamethasone (PF) (DECADRON) injection 8 mg, 8 mg, IntraVENous, Once  0.9 % sodium chloride infusion, , IntraVENous, Continuous  sodium chloride flush 0.9 % injection 5-40 mL, 5-40 mL, IntraVENous, 2 times per day  sodium chloride flush 0.9 % injection 5-40 mL, 5-40 mL, IntraVENous, PRN  0.9 % sodium chloride infusion, , IntraVENous, PRN  ceFAZolin (ANCEF) 2,000 mg in sterile water 20 mL IV syringe, 2,000 mg, IntraVENous, On Call to OR  tranexamic acid (CYKLOKAPRON) 1,000 mg in sodium chloride 0.9 % 100 mL IVPB, 1,000 mg, IntraVENous, On Call to OR    Allergies:  Bee venom, Pcn [penicillins], and Iodine    Social History:   TOBACCO:   reports that she has never smoked. She has never used smokeless tobacco.  ETOH:   reports no history of alcohol use. DRUGS:   reports no history of drug use.     Family History:       Problem Relation Age of Onset    Liver Disease Mother     Cancer Father         lung liver cancer

## 2023-07-31 NOTE — ANESTHESIA POSTPROCEDURE EVALUATION
Department of Anesthesiology  Postprocedure Note    Patient: Rock Robles  MRN: [id number]  YOB: 1955  Date of evaluation: 7/31/2023      Procedure Summary     Date: 07/31/23 Room / Location: [facility] OR 03 / [facility]    Anesthesia Start: 1005 Anesthesia Stop: 0914    Procedure: LEFT HIP TOTAL JOINT ARTHROPLASTY (Left: Hip) Diagnosis:       Primary osteoarthritis of left hip      (Primary osteoarthritis of left hip [M16.12])    Surgeons: Joseph Marley DO Responsible Provider: Cherise Gordillo DO    Anesthesia Type: General ASA Status: 2          Anesthesia Type: General    Jigar Phase I: Jigar Score: 9    Jigar Phase II:        Anesthesia Post Evaluation    Patient location during evaluation: PACU  Patient participation: complete - patient participated  Level of consciousness: awake and alert  Pain score: 0  Airway patency: patent  Nausea & Vomiting: no nausea and no vomiting  Cardiovascular status: hemodynamically stable  Respiratory status: room air  Hydration status: stable  Pain management: adequate

## 2023-07-31 NOTE — BRIEF OP NOTE
Brief Postoperative Note      Patient: Antonietta Finley  YOB: 1955  MRN: [id number]    Date of Procedure: 7/31/2023    Pre-Op Diagnosis Codes:     * Primary osteoarthritis of left hip [M16.12]    Post-Op Diagnosis: Same       Procedure(s):  LEFT HIP TOTAL JOINT ARTHROPLASTY    Surgeon(s):  Velasquez Robles DO    Assistant:  Resident: Komal Flores DO; Fer Ruiz DO    Anesthesia: General    Estimated Blood Loss (mL): 517     Complications: None    Specimens:   ID Type Source Tests Collected by Time Destination   A : BONE LEFT HIP Bone Bone SURGICAL PATHOLOGY Velasquez Robles DO 7/31/2023 0809        Implants:  Implant Name Type Inv. Item Serial No.  Lot No. LRB No. Used Action   INSERT ACET E 0 DEG 36 MM HIP X3 TRIDENT - [id number]  INSERT ACET E 0 DEG 36 MM HIP X3 TRIDENT  [employer] [facility] [id number] Left 1 Implanted   SHELL ACET SZ E [id number] 5 CLUS H TRITANIUM PRESSFIT YARA - [id number]  SHELL ACET SZ E [id number] 5 CLUS H TRITANIUM PRESSFIT YARA  [employer] [facility] [id number] Left 1 Implanted   SCREW BNE L30MM DIA6.5MM STD CANC HIP TI HMSPHR THRD DRVR - [id number]  SCREW BNE L30MM DIA6.5MM STD CANC HIP TI HMSPHR THRD DRVR  [employer] [facility] [id number] Left 1 Implanted   SCREW BNE L25MM DIA6. 5MM HEX LO PROF TRIDENT II - [id number]  SCREW BNE L25MM DIA6. 5MM HEX LO PROF TRIDENT II  [employer] [facility] [id number] Left 1 Implanted   STEM FEM SZ 3 L102MM NK L30MM 38MM OFFSET 127DEG HIP TI - [id number]  STEM FEM SZ 3 L102MM NK L30MM 38MM OFFSET 127DEG HIP TI  [employer] [facility] [id number] Left 1 Implanted   HEAD FEM [id number] +0MM OFFSET HIP BIOLOX DELT CERAMIC TAPR - [id number]  HEAD FEM [id number] +0MM OFFSET HIP BIOLOX DELT CERAMIC TAPR  [employer] [facility] [id number] Left 1 Implanted         Drains: * No LDAs found *    Findings: Left total hip arthroplasty      Electronically signed by Pamela Jerez DO on 7/31/2023 at 8:57 AM

## 2023-07-31 NOTE — CARE COORDINATION
Met with patient and spouse about diagnosis and discharge plan of care. Post op left COLLINS. Pt had previous ortho surgery. Lives in ranch home with 2 steps in with spouse. Needs wheeled walker-ordered through [facility]. Has high commode and walk in shower. Pt wants [facility] home care-referral called and accepted. Orders completed. PCP is Dr Yaz Hameed.  Will follow-o

## 2023-07-31 NOTE — OP NOTE
Operative Note      Patient: Santiago Moeller  YOB: 1955  MRN: [id number]    Date of Procedure: 7/31/2023    Pre-Op Diagnosis Codes:     * Primary osteoarthritis of left hip [M16.12]    Post-Op Diagnosis: Same       Procedure(s):  LEFT HIP TOTAL JOINT ARTHROPLASTY    Surgeon(s):  Genesis Mccain DO    Assistant:   Resident: Brionna Ibarra DO; Trung Bess DO    Anesthesia: General    Estimated Blood Loss (mL): 580     Complications: None    Specimens:   ID Type Source Tests Collected by Time Destination   A : BONE LEFT HIP Bone Bone SURGICAL PATHOLOGY Genesis Mccain DO 7/31/2023 0809        Implants:  Implant Name Type Inv. Item Serial No.  Lot No. LRB No. Used Action   INSERT ACET E 0 DEG 36 MM HIP X3 TRIDENT - [id number]  INSERT ACET E 0 DEG 36 MM HIP X3 TRIDENT  [employer] [facility] [id number] Left 1 Implanted   SHELL ACET SZ E [id number] 5 CLUS H TRITANIUM PRESSFIT YARA - [id number]  SHELL ACET SZ E [id number] 5 CLUS H TRITANIUM PRESSFIT YARA  [employer] [facility] [id number] Left 1 Implanted   SCREW BNE L30MM DIA6.5MM STD CANC HIP TI HMSPHR THRD DRVR - [id number]  SCREW BNE L30MM DIA6.5MM STD CANC HIP TI HMSPHR THRD DRVR  [employer] [facility] [id number] Left 1 Implanted   SCREW BNE L25MM DIA6. 5MM HEX LO PROF TRIDENT II - [id number]  SCREW BNE L25MM DIA6. 5MM HEX LO PROF TRIDENT II  [employer] [facility] [id number] Left 1 Implanted   STEM FEM SZ 3 L102MM NK L30MM 38MM OFFSET 127DEG HIP TI - [id number]  STEM FEM SZ 3 L102MM NK L30MM 38MM OFFSET 127DEG HIP TI  [employer] [facility] [id number] Left 1 Implanted   HEAD FEM [id number] +0MM OFFSET HIP BIOLOX DELT CERAMIC TAPR - [id number]  HEAD FEM [id number] +0MM OFFSET HIP BIOLOX DELT CERAMIC TAPR  [employer] [facility] [id number] Left 1 Implanted         Drains: * No LDAs found *    Findings: See operative report below        Detailed Description of Procedure:    This is a 30-year-old female who presents today for elective left total

## 2023-07-31 NOTE — ANESTHESIA POSTPROCEDURE EVALUATION
Department of Anesthesiology  Postprocedure Note    Patient: Harvie Merlin  MRN: [id number]  YOB: 1955  Date of evaluation: 7/31/2023      Procedure Summary     Date: 07/31/23 Room / Location: [facility] OR 03 / [facility]    Anesthesia Start: 5205 Anesthesia Stop: 0914    Procedure: LEFT HIP TOTAL JOINT ARTHROPLASTY (Left: Hip) Diagnosis:       Primary osteoarthritis of left hip      (Primary osteoarthritis of left hip [M16.12])    Surgeons: Zenon Ojeda DO Responsible Provider: Sue Oliver DO    Anesthesia Type: General ASA Status: 2          Anesthesia Type: General    Jigar Phase I: Jigar Score: 9    Jigar Phase II:        Anesthesia Post Evaluation    Patient location during evaluation: PACU  Patient participation: complete - patient participated  Level of consciousness: awake and alert  Nausea & Vomiting: no vomiting and no nausea  Complications: no  Cardiovascular status: hemodynamically stable  Respiratory status: acceptable and spontaneous ventilation  Hydration status: stable  Pain management: adequate

## 2023-08-01 VITALS
WEIGHT: 184 LBS | DIASTOLIC BLOOD PRESSURE: 68 MMHG | HEIGHT: 64 IN | SYSTOLIC BLOOD PRESSURE: 125 MMHG | HEART RATE: 70 BPM | BODY MASS INDEX: 31.41 KG/M2 | RESPIRATION RATE: 18 BRPM | TEMPERATURE: 98.5 F | OXYGEN SATURATION: 99 %

## 2023-08-01 LAB
ERYTHROCYTE [DISTWIDTH] IN BLOOD BY AUTOMATED COUNT: 13.9 % (ref 11.5–15)
HCT VFR BLD AUTO: 29.2 % (ref 34–48)
HGB BLD-MCNC: 9.3 G/DL (ref 11.5–15.5)
MCH RBC QN AUTO: 28.9 PG (ref 26–35)
MCHC RBC AUTO-ENTMCNC: 31.8 G/DL (ref 32–34.5)
MCV RBC AUTO: 90.7 FL (ref 80–99.9)
PLATELET # BLD AUTO: 153 K/UL (ref 130–450)
PMV BLD AUTO: 12.2 FL (ref 7–12)
RBC # BLD AUTO: 3.22 M/UL (ref 3.5–5.5)
WBC OTHER # BLD: 7.1 K/UL (ref 4.5–11.5)

## 2023-08-01 PROCEDURE — 6370000000 HC RX 637 (ALT 250 FOR IP): Performed by: STUDENT IN AN ORGANIZED HEALTH CARE EDUCATION/TRAINING PROGRAM

## 2023-08-01 PROCEDURE — G0378 HOSPITAL OBSERVATION PER HR: HCPCS

## 2023-08-01 PROCEDURE — 97530 THERAPEUTIC ACTIVITIES: CPT

## 2023-08-01 PROCEDURE — 96374 THER/PROPH/DIAG INJ IV PUSH: CPT

## 2023-08-01 PROCEDURE — 6360000002 HC RX W HCPCS: Performed by: STUDENT IN AN ORGANIZED HEALTH CARE EDUCATION/TRAINING PROGRAM

## 2023-08-01 PROCEDURE — 2580000003 HC RX 258: Performed by: STUDENT IN AN ORGANIZED HEALTH CARE EDUCATION/TRAINING PROGRAM

## 2023-08-01 PROCEDURE — 85027 COMPLETE CBC AUTOMATED: CPT

## 2023-08-01 PROCEDURE — 97535 SELF CARE MNGMENT TRAINING: CPT

## 2023-08-01 RX ADMIN — ACETAMINOPHEN 650 MG: 325 TABLET ORAL at 01:29

## 2023-08-01 RX ADMIN — OXYCODONE HYDROCHLORIDE 5 MG: 5 TABLET ORAL at 01:31

## 2023-08-01 RX ADMIN — OXYCODONE HYDROCHLORIDE 5 MG: 5 TABLET ORAL at 10:57

## 2023-08-01 RX ADMIN — VENLAFAXINE HYDROCHLORIDE 37.5 MG: 37.5 CAPSULE, EXTENDED RELEASE ORAL at 10:58

## 2023-08-01 RX ADMIN — WATER 2000 MG: 1 INJECTION INTRAMUSCULAR; INTRAVENOUS; SUBCUTANEOUS at 01:29

## 2023-08-01 RX ADMIN — Medication 2000 UNITS: at 10:58

## 2023-08-01 RX ADMIN — ACETAMINOPHEN 650 MG: 325 TABLET ORAL at 06:42

## 2023-08-01 RX ADMIN — OXYCODONE HYDROCHLORIDE 5 MG: 5 TABLET ORAL at 06:42

## 2023-08-01 RX ADMIN — ASPIRIN 325 MG: 325 TABLET, COATED ORAL at 10:58

## 2023-08-01 RX ADMIN — KETOROLAC TROMETHAMINE 15 MG: 30 INJECTION, SOLUTION INTRAMUSCULAR; INTRAVENOUS at 03:32

## 2023-08-01 ASSESSMENT — PAIN - FUNCTIONAL ASSESSMENT
PAIN_FUNCTIONAL_ASSESSMENT: ACTIVITIES ARE NOT PREVENTED

## 2023-08-01 ASSESSMENT — PAIN DESCRIPTION - DESCRIPTORS
DESCRIPTORS: DISCOMFORT;SORE;TENDER
DESCRIPTORS: ACHING;DISCOMFORT
DESCRIPTORS: DISCOMFORT;SORE;TENDER
DESCRIPTORS: DISCOMFORT;SORE;TENDER

## 2023-08-01 ASSESSMENT — PAIN SCALES - GENERAL
PAINLEVEL_OUTOF10: 4
PAINLEVEL_OUTOF10: 4
PAINLEVEL_OUTOF10: 1
PAINLEVEL_OUTOF10: 1
PAINLEVEL_OUTOF10: 3
PAINLEVEL_OUTOF10: 4

## 2023-08-01 ASSESSMENT — PAIN DESCRIPTION - ORIENTATION
ORIENTATION: LEFT

## 2023-08-01 ASSESSMENT — PAIN DESCRIPTION - LOCATION
LOCATION: HIP

## 2023-08-01 NOTE — PLAN OF CARE
Problem: Discharge Planning  Goal: Discharge to home or other facility with appropriate resources  Outcome: Progressing  Flowsheets (Taken 7/31/2023 1204 by Matilda Mortensen RN)  Discharge to home or other facility with appropriate resources: Refer to discharge planning if patient needs post-hospital services based on physician order or complex needs related to functional status, cognitive ability or social support system     Problem: Pain  Goal: Verbalizes/displays adequate comfort level or baseline comfort level  Outcome: Progressing     Problem: Safety - Adult  Goal: Free from fall injury  Outcome: Progressing

## 2023-08-01 NOTE — DISCHARGE SUMMARY
Physician Discharge Summary     Patient ID:  Julián Marin  [id number]  79 y.o.  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 The patient was subsequently taken to the PACU and to the floor in stable condition. The patient continued antibiotics 24 hours postoperatively, as they received a dose of antibiotics preoperatively for infection prophylaxis. The patient was to begin physical therapy, WBAT, the day of surgery. The patient was also started on DVT prophylaxis. Blood counts were followed daily.  was consulted for D/C planning as the patient made appropriate gains with PT in regaining independent function. On POD 1, the patient was discharged to home in stable condition. Treatments: s/p left total Hip arthroplasty    Disposition: The patient was provided instructions to follow up with Toya Caicedo DO in 2 weeks and to call the office for an appointment. staples (s) were to be removed in 2 weeks. Pt was to continue DVT prophylaxis as directed. Patient can shower over top of the bandage and remove in 7 days. And call the office if they have any questions. Appropriate DVT prophylaxis pain medicine abdomen prescribed  [unfilled]      Signed:   Yee Lemon DO  8/1/2023

## 2023-08-01 NOTE — PROGRESS NOTES
4 Eyes Skin Assessment     NAME:  Kamilla Ochoa  YOB: 1955  MEDICAL RECORD NUMBER:  [id number]    The patient is being assessed for  Admission    I agree that at least one RN has performed a thorough Head to Toe Skin Assessment on the patient. ALL assessment sites listed below have been assessed. Areas assessed by both nurses:    Head, Face, Ears, Shoulders, Back, Chest, Arms, Elbows, Hands, Sacrum. Buttock, Coccyx, Ischium, and Legs. Feet and Heels    Incision left hip see LDA         Does the Patient have a Wound?  No noted wound(s)       Rasta Prevention initiated by RN: No  Wound Care Orders initiated by RN: No    Pressure Injury (Stage 3,4, Unstageable, DTI, NWPT, and Complex wounds) if present, place Wound referral order by RN under : No    New Ostomies, if present place, Ostomy referral order under : No     Nurse 1 eSignature: Electronically signed by Tonia Lim RN on 7/31/23 at 2:02 PM EDT    **SHARE this note so that the co-signing nurse can place an eSignature**    Nurse 2 eSignature: Electronically signed by Jerilyn Carpenter RN on 7/31/23 at 3:40 PM EDT eye
All discharge instructions dicussed, patient is comfortable being discharged. All questions answered.
CLINICAL PHARMACY NOTE: MEDS TO BEDS    Total # of Prescriptions Filled: 2   The following medications were delivered to the patient:  Percocet 5/325 mg   Aspirin 325 mg     Additional Documentation:  Delivered to patient -- Dawn Edwards
Database initiated pharmacy and medications verified with the patient. She is A&O independent from home with . She uses no assistive devices and is RA at baseline.
May use CBC, CMP from 7/14/23 from PCP office visit for DOS. per Dr. Mary Jacobson
Occupational Therapy    OCCUPATIONAL THERAPY INITIAL EVALUATION    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.642]    Surgery: L COLLINS      Pertinent Medical History:  R COLLINS 3/2021     Precautions:  Fall Risk, WBAT L LE, anterolateral hip precautions      Assessment of current deficits    [x] Functional mobility  [x]ADLs  [x] Strength               []Cognition    [x] Functional transfers   [x] IADLs         [x] Safety Awareness   [x]Endurance    [] Fine Coordination              [x] Balance      [] Vision/perception   []Sensation     []Gross Motor Coordination  [] ROM  [] Delirium                   [] Motor Control     OT PLAN OF CARE   OT POC based on physician orders, patient diagnosis and results of clinical assessment    Frequency/Duration  2-4 days/wk for 2 weeks PRN   Specific OT Treatment Interventions to include:   ADL retraining/adapted techniques and AE recommendations to increase functional independence within precautions                    Energy conservation techniques to improve tolerance for selfcare routine   Functional transfer/mobility training/DME recommendations for increased independence, safety and fall prevention         Patient/family education to increase safety and functional independence             Environmental modifications for safe mobility and completion of ADLs                             Therapeutic activity to improve functional performance during ADLs.                                          Therapeutic exercise to
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.642]    Surgery: L COLLINS      Pertinent Medical History:  R COLLINS 3/2021     Precautions:  Fall Risk, WBAT L LE, anterolateral hip precautions       Assessment of current deficits    [x] Functional mobility            [x]ADLs           [x] Strength                  []Cognition    [x] Functional transfers          [x] IADLs         [x] Safety Awareness   [x]Endurance    [] Fine Coordination                         [x] Balance      [] Vision/perception   []Sensation      []Gross Motor Coordination             [] ROM           [] Delirium                   [] Motor Control      OT PLAN OF CARE   OT POC based on physician orders, patient diagnosis and results of clinical assessment     Frequency/Duration  2-4 days/wk for 2 weeks PRN   Specific OT Treatment Interventions to include:   ADL retraining/adapted techniques and AE recommendations to increase functional independence within precautions                    Energy conservation techniques to improve tolerance for selfcare routine   Functional transfer/mobility training/DME recommendations for increased independence, safety and fall prevention         Patient/family education to increase safety and functional independence             Environmental modifications for safe mobility and completion of ADLs                             Therapeutic activity to improve functional performance during ADLs.                                          Therapeutic exercise to improve tolerance and functional strength for ADLs    Balance retraining/tolerance tasks for facilitation of
Patient report called to 7th floor RN; family waiting room notified of patient transfer. Antonina Escalona RN.
Physical Therapy    Initial Assessment     Name: Francy Shipman  : 1955  MRN: [id number]      Date of Service: 2023    Evaluating PT: Aileen Mcclure, PT, DPT [id number]      Room #:  1706/9601-Y  Diagnosis:  Primary osteoarthritis of left hip [M16.12]  Status post total hip replacement, left [Z96.642]  PMHx/PSHx:   has a past medical history of Anxiety, Arthritis, Depression, and Sjogren's disease ([facility]). Procedure/Surgery:  Left COLLINS   Precautions:  Fall risk, WBAT L LE, Anterolateral hip precautions  Equipment Needs:  [employer]    SUBJECTIVE:    Pt lives with  in a single story condo with 2 stair(s) and 1 rail(s) to enter. Pt ambulated without AD prior to admission. Pt owns cane. OBJECTIVE:   Initial Evaluation  Date: 23 Treatment Date: Short Term/ Long Term   Goals   AM-PAC 6 Clicks      Was pt agreeable to Eval/treatment? Yes     Does pt have pain? 6/10 L hip pain     Bed Mobility  Rolling: NT  Supine to sit: NT  Sit to supine: Min A  Scooting: Min A to HOB  Rolling: Independent   Supine to sit: Independent   Sit to supine: Independent   Scooting: Independent    Transfers Sit to stand: SBA  Stand to sit: SBA  Stand pivot: SBA with [employer]  Sit to stand: Independent   Stand to sit: Independent  Stand pivot: Mod Independent with [employer]   Ambulation   75 feet with [employer] with SBA  >400 feet with [employer] Mod Independent    Stair negotiation: ascended and descended NT  >4 step(s) with 1 rail(s) Mod Independent    ROM BUE: Refer to OT note  BLE: WFL     Strength BUE: Refer to OT note  BLE: WFL     Balance Sitting EOB: SBA  Dynamic Standing: SBA with [employer]  Sitting EOB: Independent   Dynamic Standing: Mod Independent with [employer]     Pt is A & O x: 4 to person, place, month/year, and situation. Sensation: Pt denies numbness and tingling of extremities. Edema: NT    Patient education  Pt educated on anterolateral hip precautions.     Patient response to education:   Pt verbalized understanding Pt demonstrated skill Pt
Santiago Moeller was ordered Xerostomia Relief Spray which is a nonformulary medication. This medication is not stocked by the pharmacy so it will need to be supplied by the patient for inpatient use. If the medication has not been administered by 1400 on the following day from the time the order was placed, a pharmacist will follow-up with the nurse of the patient to assess the capability of the patient to bring in the medication.     Thank you
H&H  Continue pain control p.o.   Continue DVT prophylaxis with aspirin 325 mg twice daily  D/C: Patient cleared for discharge from orthopedic standpoint
exercise of the following: NT. Pt instructed to perform ankle pumps and glut sets to tolerance, states understands to do this. Patient education/treatment  Pt and  was educated on UE usage for transfer safety, gait mechanics for posture, sequence, staying within [facility] base of support, step negotiation promoting sequence, car transfer to back into the front passenger seat, ascending 10\" step backwards using [facility] for support to simulate step needed to get into bed    Patient response to education:   Pt verbalized understanding Pt demonstrated skill Pt requires further education in this area   yes With instruction yes     ASSESSMENT:   Comments: Pt found in bed, sat EOB SBA. Gait slow and consistent, step through reciprocal pattern displayed throughout, no loss of balance, dizziness or shortness of breath noted. Steps with minimal difficulty, states feels safe to enter her home. Car transfer discussed, Pt and  states has no further questions about home safety. Pt was left in a bedside chair with call light in reach    Time in 0927   Time out 1002   Total Treatment Time 35 minutes   CPT codes:     Therapeutic activities 69234 35 minutes   Therapeutic exercises 58878 0 minutes       Pt is making good progress toward established Physical Therapy goals. Pt displays functional mobility needed to go home with family assistance. Continue with physical therapy current plan of care.     Guido Schulte PTA   License Number: [id number]

## 2023-08-01 NOTE — CARE COORDINATION
Updated plan of care. POD#1. Wheeled walker to be delivered from [facility]. Plan is home with [facility]-orders completed.  Plan for discharge today-[facility]

## 2023-08-03 LAB — SURGICAL PATHOLOGY REPORT: NORMAL

## 2023-08-04 ENCOUNTER — TELEPHONE (OUTPATIENT)
Dept: ORTHOPEDIC SURGERY | Age: 68
End: 2023-08-04

## 2023-08-04 DIAGNOSIS — Z96.641 STATUS POST TOTAL HIP REPLACEMENT, RIGHT: Primary | ICD-10-CM

## 2023-08-04 DIAGNOSIS — M16.11 PRIMARY OSTEOARTHRITIS OF RIGHT HIP: ICD-10-CM

## 2023-08-04 RX ORDER — METHOCARBAMOL 750 MG/1
750 TABLET, FILM COATED ORAL 3 TIMES DAILY
Qty: 90 TABLET | Refills: 0 | Status: SHIPPED | OUTPATIENT
Start: 2023-08-04 | End: 2023-09-03

## 2023-08-04 RX ORDER — TIZANIDINE 2 MG/1
2 TABLET ORAL 3 TIMES DAILY PRN
Qty: 30 TABLET | Refills: 0 | Status: SHIPPED | OUTPATIENT
Start: 2023-08-04 | End: 2023-08-14

## 2023-08-04 NOTE — TELEPHONE ENCOUNTER
Left total hip 7/31/23    Caden Reddish from [address] calling, she is unable to get any hip flexion and unable to get pain under control. She is not sure if pain is preventing hip motion. Patient is at 7/10 pain with pain medication every 6 hours and adding Tylenol in between. Goes to 10/10 pain during PT.     Sent for further recommendation

## 2023-08-08 DIAGNOSIS — Z96.642 S/P TOTAL LEFT HIP ARTHROPLASTY: ICD-10-CM

## 2023-08-08 RX ORDER — OXYCODONE HYDROCHLORIDE AND ACETAMINOPHEN 5; 325 MG/1; MG/1
1 TABLET ORAL EVERY 6 HOURS PRN
Qty: 28 TABLET | Refills: 0 | Status: SHIPPED | OUTPATIENT
Start: 2023-08-08 | End: 2023-08-15

## 2023-08-08 NOTE — TELEPHONE ENCOUNTER
Controlled Substance Monitoring:    Acute and Chronic Pain Monitoring:   RX Monitoring 8/8/2023   Periodic Controlled Substance Monitoring No signs of potential drug abuse or diversion identified.

## 2023-08-10 ENCOUNTER — TELEPHONE (OUTPATIENT)
Dept: ORTHOPEDIC SURGERY | Age: 68
End: 2023-08-10

## 2023-08-10 NOTE — TELEPHONE ENCOUNTER
Leander Knutson PT - checked with patient, Patrickmary Mckeon is going to see Dr Irma Torres (PCP) this afternoon

## 2023-08-10 NOTE — TELEPHONE ENCOUNTER
Left Total Hip 7/31/23    [place] is PT with [facility], she was in to see patient yesterday. Pain and ambulation is better. She and pt  are concerned as Tsering Dougherty seems to be sleeping more, SOB when ambulating, low blood pressure during PT (80/47) and not eating well. States BP will rise with rest and has educated pt on nutritional intake during healing process. Wanted to notify and see if Dr Eusebio Finn wants to order labs?

## 2023-08-14 ENCOUNTER — OFFICE VISIT (OUTPATIENT)
Dept: ORTHOPEDIC SURGERY | Age: 68
End: 2023-08-14

## 2023-08-14 ENCOUNTER — TELEPHONE (OUTPATIENT)
Dept: ORTHOPEDIC SURGERY | Age: 68
End: 2023-08-14

## 2023-08-14 DIAGNOSIS — Z96.642 S/P TOTAL LEFT HIP ARTHROPLASTY: Primary | ICD-10-CM

## 2023-08-14 PROCEDURE — 99024 POSTOP FOLLOW-UP VISIT: CPT | Performed by: STUDENT IN AN ORGANIZED HEALTH CARE EDUCATION/TRAINING PROGRAM

## 2023-08-14 NOTE — PROGRESS NOTES
Follow Up Post Operative Visit     Surgery: Left total hip arthroplasty  Date: 7/31/2023    Subjective:    Deepak Chang is here for follow up visit s/p above procedure. She is doing well. She had some immediate postoperative pain control issues and was started on a muscle relaxer while is on vacation. She is also had some issues with shortness of breath and fatigue which has been addressed by her primary care physician. She is starting to feel better. Her pain is much better controlled now she is progressing with therapy. She is happy with the results. Controlled Substances Monitoring:        Physical Exam:    No data recorded    General: Alert and oriented x3, no acute distress  Cardiovascular/pulmonary: No labored breathing, peripheral perfusion intact  Musculoskeletal:    Left hip: No swelling or ecchymosis present. No evidence of hematoma. Incision well approximated with Steri-Strips. She has full hip range of motion without pain. Thigh soft compressible. Knee range of motion is full with 5 out of 5 strength. She is ambulating with a cane. Ankle plantar dorsiflexion intact      Imaging: AP pelvis and 2 views of the left hip independently interpreted myself discussed the patient. Shows a well-placed total hip arthroplasty with complication. Assessment and Plan: 2 weeks status post left total hip arthroplasty  -Continue to full anterolateral hip precautions. Weightbearing as tolerated. Finish the full 30 days of the aspirin for DVT prophylaxis. We will start outpatient therapy. When she needs pain medicine refill she can call. All of her questions answered in detail. Apparently her blood work from her primary care physician looked good last week. She is happy with her results.   Follow-up in 4 weeks for repeat imaging    Jason Abdul DO   Orthopaedic Surgery   8/14/23   10:22 AM EDT

## 2023-08-14 NOTE — TELEPHONE ENCOUNTER
Patient states that her BP has been dropping especially with exercise and getting up from sitting position. Patient states her Hgb was 9.3 g/dl when checked on 8/1/2023. Lt COLLINS  7/31/2023    She states when getting up from sitting, she feels very lightheaded and heart is racing. This can happen several times throughout the day. Informed patient to contact PCP Dr Ernestina Elizondo regarding above symptoms. She denies shortness of breath and chest pain. BP Readings 8/14/2023 with PT:  92/53  Sitting                                         103/71  With Exercise (single leg stairs)  PT states BP drops when resting, standing, or when activity stops while in therapy. 82/54   Sitting after exercise.

## 2023-08-16 ENCOUNTER — TELEPHONE (OUTPATIENT)
Dept: ORTHOPEDIC SURGERY | Age: 68
End: 2023-08-16

## 2023-08-16 NOTE — TELEPHONE ENCOUNTER
Left total hip 7/31/23    Released for out patient PT but they are unable to see her for at least one week. Ok to wait one week or would you like home PT to continue care for now?

## 2023-08-23 ENCOUNTER — EVALUATION (OUTPATIENT)
Dept: PHYSICAL THERAPY | Age: 68
End: 2023-08-23

## 2023-08-23 DIAGNOSIS — M25.552 LEFT HIP PAIN: Primary | ICD-10-CM

## 2023-08-27 ASSESSMENT — PROMIS GLOBAL HEALTH SCALE
IN THE PAST 7 DAYS, HOW OFTEN HAVE YOU BEEN BOTHERED BY EMOTIONAL PROBLEMS, SUCH AS FEELING ANXIOUS, DEPRESSED, OR IRRITABLE [ON A SCALE FROM 1 (NEVER) TO 5 (ALWAYS)]?: 4
IN GENERAL, WOULD YOU SAY YOUR QUALITY OF LIFE IS...[ON A SCALE OF 1 (POOR) TO 5 (EXCELLENT)]: 5
IN GENERAL, WOULD YOU SAY YOUR HEALTH IS...[ON A SCALE OF 1 (POOR) TO 5 (EXCELLENT)]: 4
IN GENERAL, HOW WOULD YOU RATE YOUR SATISFACTION WITH YOUR SOCIAL ACTIVITIES AND RELATIONSHIPS [ON A SCALE OF 1 (POOR) TO 5 (EXCELLENT)]?: 5
SUM OF RESPONSES TO QUESTIONS 3, 6, 7, & 8: 15
IN GENERAL, HOW WOULD YOU RATE YOUR PHYSICAL HEALTH [ON A SCALE OF 1 (POOR) TO 5 (EXCELLENT)]?: 5
IN GENERAL, PLEASE RATE HOW WELL YOU CARRY OUT YOUR USUAL SOCIAL ACTIVITIES (INCLUDES ACTIVITIES AT HOME, AT WORK, AND IN YOUR COMMUNITY, AND RESPONSIBILITIES AS A PARENT, CHILD, SPOUSE, EMPLOYEE, FRIEND, ETC) [ON A SCALE OF 1 (POOR) TO 5 (EXCELLENT)]?: 5
IN THE PAST 7 DAYS, HOW WOULD YOU RATE YOUR FATIGUE ON AVERAGE [ON A SCALE FROM 1 (NONE) TO 5 (VERY SEVERE)]?: 4
TO WHAT EXTENT ARE YOU ABLE TO CARRY OUT YOUR EVERYDAY PHYSICAL ACTIVITIES SUCH AS WALKING, CLIMBING STAIRS, CARRYING GROCERIES, OR MOVING A CHAIR [ON A SCALE OF 1 (NOT AT ALL) TO 5 (COMPLETELY)]?: 5
HOW IS THE PROMIS V1.1 BEING ADMINISTERED?: 2
IN GENERAL, HOW WOULD YOU RATE YOUR MENTAL HEALTH, INCLUDING YOUR MOOD AND YOUR ABILITY TO THINK [ON A SCALE OF 1 (POOR) TO 5 (EXCELLENT)]?: 4
SUM OF RESPONSES TO QUESTIONS 2, 4, 5, & 10: 18
IN THE PAST 7 DAYS, HOW WOULD YOU RATE YOUR PAIN ON AVERAGE [ON A SCALE FROM 0 (NO PAIN) TO 10 (WORST IMAGINABLE PAIN)]?: 1

## 2023-08-27 ASSESSMENT — HOOS JR
GOING UP OR DOWN STAIRS: 1
RISING FROM SITTING: 0
SITTING: 0
HOOS JR RAW SCORE: 3
LYING IN BED (TURNING OVER, MAINTAINING HIP POSITION): 0
HOOS JR RAW SCORE: 3
HOOS JR TOTAL INTERVAL SCORE: 80.55
WALKING ON UNEVEN SURFACE: 1
BENDING TO THE FLOOR TO PICK UP OBJECT: 1

## 2023-08-30 ENCOUNTER — APPOINTMENT (OUTPATIENT)
Dept: CT IMAGING | Age: 68
End: 2023-08-30
Payer: MEDICARE

## 2023-08-30 ENCOUNTER — TREATMENT (OUTPATIENT)
Dept: PHYSICAL THERAPY | Age: 68
End: 2023-08-30

## 2023-08-30 ENCOUNTER — HOSPITAL ENCOUNTER (EMERGENCY)
Age: 68
Discharge: HOME | End: 2023-08-30
Payer: MEDICARE

## 2023-08-30 VITALS
TEMPERATURE: 97.9 F | SYSTOLIC BLOOD PRESSURE: 123 MMHG | BODY MASS INDEX: 27.31 KG/M2 | OXYGEN SATURATION: 100 % | HEIGHT: 64 IN | DIASTOLIC BLOOD PRESSURE: 86 MMHG | HEART RATE: 85 BPM | RESPIRATION RATE: 16 BRPM | WEIGHT: 160 LBS

## 2023-08-30 DIAGNOSIS — R91.8 ABNORMAL CT SCAN OF LUNG: ICD-10-CM

## 2023-08-30 DIAGNOSIS — N30.00 ACUTE CYSTITIS WITHOUT HEMATURIA: ICD-10-CM

## 2023-08-30 DIAGNOSIS — K29.00 ACUTE GASTRITIS WITHOUT HEMORRHAGE, UNSPECIFIED GASTRITIS TYPE: ICD-10-CM

## 2023-08-30 DIAGNOSIS — R10.13 EPIGASTRIC PAIN: Primary | ICD-10-CM

## 2023-08-30 LAB
ALBUMIN SERPL-MCNC: 4.1 G/DL (ref 3.5–5.2)
ALP SERPL-CCNC: 125 U/L (ref 35–104)
ALT SERPL-CCNC: 7 U/L (ref 0–32)
ANION GAP SERPL CALCULATED.3IONS-SCNC: 9 MMOL/L (ref 7–16)
AST SERPL-CCNC: 16 U/L (ref 0–31)
BACTERIA URNS QL MICRO: ABNORMAL
BASOPHILS # BLD: 0.04 K/UL (ref 0–0.2)
BASOPHILS NFR BLD: 1 % (ref 0–2)
BILIRUB SERPL-MCNC: 0.3 MG/DL (ref 0–1.2)
BILIRUB UR QL STRIP: NEGATIVE
BUN SERPL-MCNC: 19 MG/DL (ref 6–23)
CALCIUM SERPL-MCNC: 9.6 MG/DL (ref 8.6–10.2)
CHLORIDE SERPL-SCNC: 100 MMOL/L (ref 98–107)
CLARITY UR: CLEAR
CO2 SERPL-SCNC: 26 MMOL/L (ref 22–29)
COLOR UR: YELLOW
CREAT SERPL-MCNC: 0.7 MG/DL (ref 0.5–1)
EKG ATRIAL RATE: 65 BPM
EKG P AXIS: 69 DEGREES
EKG P-R INTERVAL: 148 MS
EKG Q-T INTERVAL: 430 MS
EKG QRS DURATION: 80 MS
EKG QTC CALCULATION (BAZETT): 447 MS
EKG R AXIS: 64 DEGREES
EKG T AXIS: 73 DEGREES
EKG VENTRICULAR RATE: 65 BPM
EOSINOPHIL # BLD: 0.19 K/UL (ref 0.05–0.5)
EOSINOPHILS RELATIVE PERCENT: 3 % (ref 0–6)
ERYTHROCYTE [DISTWIDTH] IN BLOOD BY AUTOMATED COUNT: 14.1 % (ref 11.5–15)
GFR SERPL CREATININE-BSD FRML MDRD: >60 ML/MIN/1.73M2
GLUCOSE SERPL-MCNC: 104 MG/DL (ref 74–99)
GLUCOSE UR STRIP-MCNC: NEGATIVE MG/DL
HCT VFR BLD AUTO: 33.8 % (ref 34–48)
HGB BLD-MCNC: 10.8 G/DL (ref 11.5–15.5)
HGB UR QL STRIP.AUTO: NEGATIVE
IMM GRANULOCYTES # BLD AUTO: 0.05 K/UL (ref 0–0.58)
IMM GRANULOCYTES NFR BLD: 1 % (ref 0–5)
KETONES UR STRIP-MCNC: NEGATIVE MG/DL
LEUKOCYTE ESTERASE UR QL STRIP: ABNORMAL
LYMPHOCYTES NFR BLD: 1.17 K/UL (ref 1.5–4)
LYMPHOCYTES RELATIVE PERCENT: 20 % (ref 20–42)
MCH RBC QN AUTO: 28.6 PG (ref 26–35)
MCHC RBC AUTO-ENTMCNC: 32 G/DL (ref 32–34.5)
MCV RBC AUTO: 89.7 FL (ref 80–99.9)
MONOCYTES NFR BLD: 0.55 K/UL (ref 0.1–0.95)
MONOCYTES NFR BLD: 9 % (ref 2–12)
NEUTROPHILS NFR BLD: 66 % (ref 43–80)
NEUTS SEG NFR BLD: 3.93 K/UL (ref 1.8–7.3)
NITRITE UR QL STRIP: NEGATIVE
PH UR STRIP: 5.5 [PH] (ref 5–9)
PLATELET # BLD AUTO: 312 K/UL (ref 130–450)
PMV BLD AUTO: 11 FL (ref 7–12)
POTASSIUM SERPL-SCNC: 4.2 MMOL/L (ref 3.5–5)
PROT SERPL-MCNC: 9.5 G/DL (ref 6.4–8.3)
PROT UR STRIP-MCNC: NEGATIVE MG/DL
RBC # BLD AUTO: 3.77 M/UL (ref 3.5–5.5)
RBC #/AREA URNS HPF: ABNORMAL /HPF
SODIUM SERPL-SCNC: 135 MMOL/L (ref 132–146)
SP GR UR STRIP: 1.02 (ref 1–1.03)
TROPONIN I SERPL HS-MCNC: 11 NG/L (ref 0–9)
TROPONIN I SERPL HS-MCNC: 9 NG/L (ref 0–9)
UROBILINOGEN UR STRIP-ACNC: 0.2 EU/DL (ref 0–1)
WBC #/AREA URNS HPF: ABNORMAL /HPF
WBC OTHER # BLD: 5.9 K/UL (ref 4.5–11.5)

## 2023-08-30 PROCEDURE — 80053 COMPREHEN METABOLIC PANEL: CPT

## 2023-08-30 PROCEDURE — 6360000002 HC RX W HCPCS: Performed by: PHYSICIAN ASSISTANT

## 2023-08-30 PROCEDURE — 99285 EMERGENCY DEPT VISIT HI MDM: CPT

## 2023-08-30 PROCEDURE — 96374 THER/PROPH/DIAG INJ IV PUSH: CPT

## 2023-08-30 PROCEDURE — 93010 ELECTROCARDIOGRAM REPORT: CPT | Performed by: INTERNAL MEDICINE

## 2023-08-30 PROCEDURE — 6360000004 HC RX CONTRAST MEDICATION: Performed by: RADIOLOGY

## 2023-08-30 PROCEDURE — 84484 ASSAY OF TROPONIN QUANT: CPT

## 2023-08-30 PROCEDURE — 6370000000 HC RX 637 (ALT 250 FOR IP): Performed by: PHYSICIAN ASSISTANT

## 2023-08-30 PROCEDURE — 81001 URINALYSIS AUTO W/SCOPE: CPT

## 2023-08-30 PROCEDURE — 74177 CT ABD & PELVIS W/CONTRAST: CPT

## 2023-08-30 PROCEDURE — 93005 ELECTROCARDIOGRAM TRACING: CPT | Performed by: PHYSICIAN ASSISTANT

## 2023-08-30 PROCEDURE — C9113 INJ PANTOPRAZOLE SODIUM, VIA: HCPCS | Performed by: PHYSICIAN ASSISTANT

## 2023-08-30 PROCEDURE — 85025 COMPLETE CBC W/AUTO DIFF WBC: CPT

## 2023-08-30 PROCEDURE — 71275 CT ANGIOGRAPHY CHEST: CPT

## 2023-08-30 PROCEDURE — 2580000003 HC RX 258: Performed by: PHYSICIAN ASSISTANT

## 2023-08-30 RX ORDER — CEFDINIR 300 MG/1
300 CAPSULE ORAL 2 TIMES DAILY
Qty: 14 CAPSULE | Refills: 0 | Status: SHIPPED | OUTPATIENT
Start: 2023-08-30 | End: 2023-09-06

## 2023-08-30 RX ORDER — 0.9 % SODIUM CHLORIDE 0.9 %
1000 INTRAVENOUS SOLUTION INTRAVENOUS ONCE
Status: COMPLETED | OUTPATIENT
Start: 2023-08-30 | End: 2023-08-30

## 2023-08-30 RX ORDER — PANTOPRAZOLE SODIUM 40 MG/1
40 TABLET, DELAYED RELEASE ORAL DAILY
Qty: 30 TABLET | Refills: 0 | Status: SHIPPED | OUTPATIENT
Start: 2023-08-30 | End: 2023-09-29

## 2023-08-30 RX ORDER — PANTOPRAZOLE SODIUM 40 MG/10ML
40 INJECTION, POWDER, LYOPHILIZED, FOR SOLUTION INTRAVENOUS ONCE
Status: COMPLETED | OUTPATIENT
Start: 2023-08-30 | End: 2023-08-30

## 2023-08-30 RX ADMIN — PANTOPRAZOLE SODIUM 40 MG: 40 INJECTION, POWDER, FOR SOLUTION INTRAVENOUS at 10:41

## 2023-08-30 RX ADMIN — SODIUM CHLORIDE 1000 ML: 9 INJECTION, SOLUTION INTRAVENOUS at 10:41

## 2023-08-30 RX ADMIN — IOPAMIDOL 75 ML: 755 INJECTION, SOLUTION INTRAVENOUS at 11:57

## 2023-08-30 RX ADMIN — Medication: at 10:41

## 2023-08-30 ASSESSMENT — PAIN SCALES - GENERAL: PAINLEVEL_OUTOF10: 8

## 2023-08-30 ASSESSMENT — PAIN DESCRIPTION - LOCATION: LOCATION: ABDOMEN

## 2023-08-30 ASSESSMENT — PAIN - FUNCTIONAL ASSESSMENT: PAIN_FUNCTIONAL_ASSESSMENT: 0-10

## 2023-08-30 NOTE — ED PROVIDER NOTES
Independent ANAHI Visit. [address]   ED  Encounter Note  Admit Date/RoomTime: 2023  9:01 AM  ED Room: 33  NAME: Raya Mojica  : 1955  MRN: [id number]  PCP: Katherine Aj MD    CHIEF COMPLAINT     Abdominal Pain, Emesis, and Nausea    HISTORY OF PRESENT ILLNESS        Raya Mojica is a 79 y.o. female who presents to the ED by private vehicle for epigastric pain, radiates to left scapula, beginning 5 day(s) ago. The complaint has been persistent and are moderate in severity. The patient states that a month ago she had a total hip placement arthroplasty by Dr. Bud Little. She reports the surgery went well and she really has had no significant complications. She denies any wound drainage or redness. Denies lower extremity swelling. The patient is here because she is having persistent and severe epigastric pain. This started late last week. She did reach out to her surgeon on  and was told she can stop the aspirin. She states that sometimes the pain was a little bit better with some toast or food. She tried Pepto-Bismol without relief. The pain does radiate to the left scapula. Its not particularly worse with a deep breath. The patient states that she is been very nauseated but had no vomiting. Bowels seem to be moving well. She denies history of acid reflux. She did not take any Tums or Maalox at home. No history of blood clots. Again she denies any lower extremity swelling. REVIEW OF SYSTEMS     Pertinent positives and negatives are stated within HPI, all other systems reviewed and are negative. Past Medical History:  has a past medical history of Anxiety, Arthritis, Depression, and Sjogren's disease ([address]). Surgical History:  has a past surgical history that includes Dilation and curettage of uterus (); Cataract removal (Left); Shoulder arthroscopy (Right, 2015);  Total hip arthroplasty

## 2023-08-30 NOTE — ED NOTES
Checked in with patient while in the waiting room. Updated on status of department. Instructed patient to let me know if their condition or symptoms change at any time. [place] offered. Patient denied any needs or questions at this time.       Molly He RN  08/30/23 8808

## 2023-08-30 NOTE — ED NOTES
Name: Yu Ramos  : 1955  MRN: [id number]    Date: 2023    Benefits of immediately proceeding with Radiology exam outweigh the risks and therefore the following is being waived:      [] Pregnancy test    [x] Protocol for Iodine allergy    [] MRI questionnaire    [] BUN/Creatinine        COURTNEY Parrish PA-C  23 1143

## 2023-09-06 ENCOUNTER — TREATMENT (OUTPATIENT)
Dept: PHYSICAL THERAPY | Age: 68
End: 2023-09-06

## 2023-09-06 DIAGNOSIS — Z96.641 STATUS POST TOTAL REPLACEMENT OF RIGHT HIP: ICD-10-CM

## 2023-09-06 DIAGNOSIS — M25.552 LEFT HIP PAIN: Primary | ICD-10-CM

## 2023-09-06 NOTE — PROGRESS NOTES
[address] [facility]   Phone: [phone]   Fax: [phone]      Physical Therapy Daily Treatment Note    Date: 2023  Patient Name: Cuate Drake  : 1955   MRN: [id number]  DOInjury: Karen Ortiz   DOSx: 2023  Referring Provider: Arnulfo Jackson, DO  [address] [facility]  [address] [facility],  [facility] Diagnosis:   L hip pain    Outcome Measure: LEFS     S: see eval  O:   Time 2323-3314     Visit 2 Repeat outcome measure at mid point and end. Pain 0/10                       Modalities            Manual            Stretch      IT band supine      HS supine 3x30s holds L  TE   Quad Prone            Exercise      Bike      Heel slides      bridging X10 10s holds  TE   SLR x20  TE   clams X20 3s holds Supine, BTB TE   Glute sets X20 3s holds  TE         Standing step-outs c theraband all directions X30 reps  Green theraband around ankles TA   STS  x30  TA                                                         A:  Tolerated well. Above added to written HEP and will perform daily. No c/o of pain post treatment session.     P: Continue with rehab plan  Mercy Valderrama PTA [id number]    Treatment Charges: Mins Units   Initial Evaluation     Re-Evaluation     Ther Exercise         TE 12 1   Manual Therapy     MT     Ther Activities        TA 13 1   Gait Training          GT     Neuro Re-education NR     Modalities     Non-Billable Service Time     Other     Total Time/Units 25 2

## 2023-09-13 ENCOUNTER — OFFICE VISIT (OUTPATIENT)
Dept: ORTHOPEDIC SURGERY | Age: 68
End: 2023-09-13

## 2023-09-13 DIAGNOSIS — Z96.642 S/P TOTAL LEFT HIP ARTHROPLASTY: Primary | ICD-10-CM

## 2023-09-13 PROCEDURE — 99024 POSTOP FOLLOW-UP VISIT: CPT | Performed by: STUDENT IN AN ORGANIZED HEALTH CARE EDUCATION/TRAINING PROGRAM

## 2023-09-13 NOTE — PROGRESS NOTES
Follow Up Post Operative Visit     Surgery: Left total hip arthroplasty  Date: 7/31/2023    Subjective:    Harvie Merlin is here for follow up visit s/p above procedure. She is doing very well. She is feeling much better her pain is well controlled. Urinary tract infection cleaned up. She has no new complaints today. Denies fever and chills. Controlled Substances Monitoring:        Physical Exam:    No data recorded    General: Alert and oriented x3, no acute distress  Cardiovascular/pulmonary: No labored breathing, peripheral perfusion intact  Musculoskeletal:    Left hip: \Incision healed appropriately with good scar formation and no signs of infection. She has full hip range of motion without pain. Thigh soft compressible. Knee range of motion is full with 5 out of 5 strength. She is ambulating with a cane. Ankle plantar dorsiflexion intact      Imaging: AP pelvis and 2 views of the left hip independently interpreted myself discussed the patient. Shows a well-placed total hip arthroplasty with complication. Assessment and Plan: 6 weeks status post left total hip arthroplasty  -She will continue anterolateral hip precautions. Weightbearing as tolerated. No need for DVT prophylaxis. She is happy with the results. We will see her back in 2 months to take another x-ray to make sure she is recovering appropriately.       Linden Stafford DO   Orthopaedic Surgery   9/13/23  8:28 AM

## 2023-09-14 ENCOUNTER — TREATMENT (OUTPATIENT)
Dept: PHYSICAL THERAPY | Age: 68
End: 2023-09-14

## 2023-09-14 DIAGNOSIS — Z96.641 STATUS POST TOTAL REPLACEMENT OF RIGHT HIP: ICD-10-CM

## 2023-09-14 DIAGNOSIS — M25.552 LEFT HIP PAIN: Primary | ICD-10-CM

## 2023-09-14 NOTE — PROGRESS NOTES
[address] [facility]   Phone: [phone]   Fax: [phone]      Physical Therapy Daily Treatment Note    Date: 2023  Patient Name: Pretty Ames  : 1955   MRN: [id number]  DOInjury: Erika Michel   DOSx: 2023  Referring Provider: Georgi Cordon, DO  [address] [facility]  [address] [facility],  [address] [facility] Diagnosis:   L hip pain    Outcome Measure: LEFS     S: Patient reports to therapy and states she feels good this day. Requesting to be placed on a two week hold. O:   Time 920-1005     Visit 3/4 Repeat outcome measure at mid point and end. Pain 0/10                       Modalities            Manual            Stretch      IT band supine      HS supine 3x30s holds L  TE   Quad Prone            Exercise      Bike           TE    TE   Supine, BTB TE    TE         Standing step-outs c theraband all directions X30 reps  Green theraband around ankles TA    TA         Seated LAQ 10sx10 2 lb cuff weight  LLE TA   Standing SLRs-fwd/lat/bwd x25 2 lb cuff weight TA   Standing HRs X25  2 lb cuff weight  TA         Step-ups- Fwd X30  4\" TA   Step-ups-Lateral x30 4\" TA                                 A:  Tolerated well. Patient requesting to be placed on a two week hold. States she is going to try and I HEP program and let us know how things go. Scheduled an apt for 10/2. Good understanding noted post treatment session.      P: Continue with rehab plan  Heather Beth, PTA [id number]    Treatment Charges: Mins Units   Initial Evaluation     Re-Evaluation     Ther Exercise         TE     Manual Therapy     MT     Ther Activities        TA 45 3   Gait Training          GT     Neuro Re-education NR     Modalities     Non-Billable Service Time     Other     Total Time/Units 45 3

## 2023-10-04 ENCOUNTER — HOSPITAL ENCOUNTER (OUTPATIENT)
Dept: PET IMAGING | Age: 68
Discharge: HOME | End: 2023-10-06
Payer: MEDICARE

## 2023-10-04 DIAGNOSIS — R91.1 SOLITARY PULMONARY NODULE: ICD-10-CM

## 2023-10-04 LAB — GLUCOSE BLD-MCNC: 82 MG/DL (ref 74–99)

## 2023-10-04 PROCEDURE — A9552 F18 FDG: HCPCS | Performed by: RADIOLOGY

## 2023-10-04 PROCEDURE — 3430000000 HC RX DIAGNOSTIC RADIOPHARMACEUTICAL: Performed by: RADIOLOGY

## 2023-10-04 PROCEDURE — 78815 PET IMAGE W/CT SKULL-THIGH: CPT

## 2023-10-04 PROCEDURE — 82962 GLUCOSE BLOOD TEST: CPT

## 2023-10-04 RX ORDER — FLUDEOXYGLUCOSE F 18 200 MCI/ML
15 INJECTION, SOLUTION INTRAVENOUS
Status: COMPLETED | OUTPATIENT
Start: 2023-10-04 | End: 2023-10-04

## 2023-10-04 RX ADMIN — FLUDEOXYGLUCOSE F 18 15 MILLICURIE: 200 INJECTION, SOLUTION INTRAVENOUS at 13:10

## 2023-10-05 ENCOUNTER — TREATMENT (OUTPATIENT)
Dept: PHYSICAL THERAPY | Age: 68
End: 2023-10-05

## 2023-10-05 DIAGNOSIS — M25.552 LEFT HIP PAIN: Primary | ICD-10-CM

## 2023-10-05 DIAGNOSIS — Z96.641 STATUS POST TOTAL REPLACEMENT OF RIGHT HIP: ICD-10-CM

## 2023-10-05 NOTE — PROGRESS NOTES
[address] [facility]   Phone: [phone]   Fax: [phone]      Physical Therapy Daily Treatment Note    Date: 10/5/2023  Patient Name: Kan Benítez  : 1955   MRN: [id number]  DOInjury: Madonna Timmons   DOSx: 2023  Referring Provider: Radha Parker DO  [address] [facility] [facility],  [address] [place] [address] Diagnosis:   L hip pain    Outcome Measure: LEFS     S: Patient reports to therapy and states she feels good this day. Requesting to be placed on a two week hold. O:   Time 2521-7074     Visit  Repeat outcome measure at mid point and end. Pain 0/10                       Modalities      Manual            Stretch      IT band supine       TE   Quad Prone            Exercise      Bike           TE    TE   Supine, BTB TE    TE         Standing step-outs c theraband all directions X30 reps  Green theraband around ankles TA    TA         Seated LAQ 10sx10 2 lb cuff weight  LLE TA         Standing SLRs-fwd/lat/bwd x25 3 lb cuff weight TA   Standing HRs X25  3 lb cuff weight  TA   Standing marching  X25  3 lb cuff weight  TA         Step-ups- Fwd X30  4\" TA   Step-ups-Lateral x30 4\" TA         Resisted ambulation  X15 each direction Black x-band  TA   Marching on foam   X30   TA               A:  Tolerated well. Today is patients last treatment session.  Reviewed I HEPs and patient reports good understanding of what to perform for transition to home program.    P: Continue with rehab plan  Clint Gregory PTA [id number]    Treatment Charges: Mins Units   Initial Evaluation     Re-Evaluation     Ther Exercise         TE     Manual Therapy     MT     Ther Activities        TA 40 3   Gait Training          GT     Neuro Re-education NR     Modalities     Non-Billable Service Time     Other     Total Time/Units 40 3

## 2023-10-16 ENCOUNTER — TELEPHONE (OUTPATIENT)
Dept: ORTHOPEDIC SURGERY | Age: 68
End: 2023-10-16

## 2023-10-16 NOTE — TELEPHONE ENCOUNTER
Patient had Left COLLINS 7/31/23    States her incision is very red and irritated. Tender and slightly warm to the touch. States no drainage, fever or chills. Added patient to schedule Wednesday 10/18/23 for incision check. Further recommendation ? ?

## 2023-10-18 ENCOUNTER — OFFICE VISIT (OUTPATIENT)
Dept: ORTHOPEDIC SURGERY | Age: 68
End: 2023-10-18

## 2023-10-18 DIAGNOSIS — Z96.642 S/P TOTAL LEFT HIP ARTHROPLASTY: Primary | ICD-10-CM

## 2023-10-18 PROCEDURE — 99024 POSTOP FOLLOW-UP VISIT: CPT | Performed by: STUDENT IN AN ORGANIZED HEALTH CARE EDUCATION/TRAINING PROGRAM

## 2023-10-18 RX ORDER — GABAPENTIN 100 MG/1
100 CAPSULE ORAL 2 TIMES DAILY
Qty: 60 CAPSULE | Refills: 0 | Status: SHIPPED | OUTPATIENT
Start: 2023-10-18 | End: 2023-11-17

## 2023-10-18 NOTE — PROGRESS NOTES
Follow Up Post Operative Visit     Surgery: Left total hip arthroplasty  Date: 7/31/2023    Subjective:    Sayda Avery is here for follow up visit s/p above procedure. She is here today for check on her incision. About 7 to 10 days ago she developed extreme sensitivity and some redness along her incision. The redness has since resolved but she is hypersensitive to touch. She has no groin pain. She is ambulating without difficulty. Otherwise she is very happy with her hip results. She denies any recent systemic infections. Controlled Substances Monitoring:        Physical Exam:    No data recorded    General: Alert and oriented x3, no acute distress  Cardiovascular/pulmonary: No labored breathing, peripheral perfusion intact  Musculoskeletal:    Left hip: Her incision is healed appropriately with good scar formation. The incision is extremely sensitive to light touch. She is nontender to deep palpation. She has no pain with range of motion of her hip. On standing single-leg testing she has no Trendelenburg sign. She does have slightly antalgic gait. She has full hip range of motion without pain. Thigh soft compressible. Knee range of motion is full with 5 out of 5 strength. She is ambulating with a cane. Ankle plantar dorsiflexion intact      Imaging: No new imaging today    Assessment and Plan: Approximately 3 months status post left total hip arthroplasty  -I think her incision is healing appropriately. She appears to have some hyperesthesia along her incision. I will prescribe her gabapentin to help with nerve sensitivity. I will also give her some Voltaren gel which she can start rubbing on her incision. We talked about scar desensitization and touch therapy which she is going to continue to do at home. She is scheduled for a lung biopsy on Monday. She is going to let us know the results. I have NO suspicion for infection at this point.   I think this will continue to improve

## 2023-10-23 ENCOUNTER — HOSPITAL ENCOUNTER (OUTPATIENT)
Dept: CT IMAGING | Age: 68
Discharge: HOME | End: 2023-10-25
Payer: MEDICARE

## 2023-10-23 ENCOUNTER — HOSPITAL ENCOUNTER (OUTPATIENT)
Dept: GENERAL RADIOLOGY | Age: 68
Discharge: HOME | End: 2023-10-25
Payer: MEDICARE

## 2023-10-23 VITALS
SYSTOLIC BLOOD PRESSURE: 143 MMHG | RESPIRATION RATE: 18 BRPM | DIASTOLIC BLOOD PRESSURE: 72 MMHG | HEART RATE: 64 BPM | OXYGEN SATURATION: 100 %

## 2023-10-23 DIAGNOSIS — R91.8 LUNG MASS: ICD-10-CM

## 2023-10-23 LAB
ANION GAP SERPL CALCULATED.3IONS-SCNC: 6 MMOL/L (ref 7–16)
BASOPHILS # BLD: 0.03 K/UL (ref 0–0.2)
BASOPHILS NFR BLD: 1 % (ref 0–2)
BUN SERPL-MCNC: 17 MG/DL (ref 6–23)
CALCIUM SERPL-MCNC: 9.1 MG/DL (ref 8.6–10.2)
CHLORIDE SERPL-SCNC: 103 MMOL/L (ref 98–107)
CO2 SERPL-SCNC: 27 MMOL/L (ref 22–29)
CREAT SERPL-MCNC: 0.9 MG/DL (ref 0.5–1)
EOSINOPHIL # BLD: 0.06 K/UL (ref 0.05–0.5)
EOSINOPHILS RELATIVE PERCENT: 2 % (ref 0–6)
ERYTHROCYTE [DISTWIDTH] IN BLOOD BY AUTOMATED COUNT: 13.2 % (ref 11.5–15)
GFR SERPL CREATININE-BSD FRML MDRD: >60 ML/MIN/1.73M2
GLUCOSE SERPL-MCNC: 90 MG/DL (ref 74–99)
HCT VFR BLD AUTO: 34.2 % (ref 34–48)
HGB BLD-MCNC: 10.8 G/DL (ref 11.5–15.5)
IMM GRANULOCYTES # BLD AUTO: <0.03 K/UL (ref 0–0.58)
IMM GRANULOCYTES NFR BLD: 0 % (ref 0–5)
INR PPP: 1
LYMPHOCYTES NFR BLD: 0.98 K/UL (ref 1.5–4)
LYMPHOCYTES RELATIVE PERCENT: 27 % (ref 20–42)
MCH RBC QN AUTO: 26.9 PG (ref 26–35)
MCHC RBC AUTO-ENTMCNC: 31.6 G/DL (ref 32–34.5)
MCV RBC AUTO: 85.3 FL (ref 80–99.9)
MONOCYTES NFR BLD: 0.38 K/UL (ref 0.1–0.95)
MONOCYTES NFR BLD: 10 % (ref 2–12)
NEUTROPHILS NFR BLD: 60 % (ref 43–80)
NEUTS SEG NFR BLD: 2.21 K/UL (ref 1.8–7.3)
PLATELET # BLD AUTO: 226 K/UL (ref 130–450)
PMV BLD AUTO: 11.9 FL (ref 7–12)
POTASSIUM SERPL-SCNC: 4 MMOL/L (ref 3.5–5)
PROTHROMBIN TIME: 10.9 SEC (ref 9.3–12.4)
RBC # BLD AUTO: 4.01 M/UL (ref 3.5–5.5)
SODIUM SERPL-SCNC: 136 MMOL/L (ref 132–146)
WBC OTHER # BLD: 3.7 K/UL (ref 4.5–11.5)

## 2023-10-23 PROCEDURE — 80048 BASIC METABOLIC PNL TOTAL CA: CPT

## 2023-10-23 PROCEDURE — 88305 TISSUE EXAM BY PATHOLOGIST: CPT

## 2023-10-23 PROCEDURE — 77012 CT SCAN FOR NEEDLE BIOPSY: CPT

## 2023-10-23 PROCEDURE — 7100000010 HC PHASE II RECOVERY - FIRST 15 MIN

## 2023-10-23 PROCEDURE — 32408 CORE NDL BX LNG/MED PERQ: CPT

## 2023-10-23 PROCEDURE — 88312 SPECIAL STAINS GROUP 1: CPT

## 2023-10-23 PROCEDURE — 6360000002 HC RX W HCPCS: Performed by: RADIOLOGY

## 2023-10-23 PROCEDURE — 85025 COMPLETE CBC W/AUTO DIFF WBC: CPT

## 2023-10-23 PROCEDURE — 85610 PROTHROMBIN TIME: CPT

## 2023-10-23 PROCEDURE — 88313 SPECIAL STAINS GROUP 2: CPT

## 2023-10-23 PROCEDURE — 36415 COLL VENOUS BLD VENIPUNCTURE: CPT

## 2023-10-23 PROCEDURE — 71045 X-RAY EXAM CHEST 1 VIEW: CPT

## 2023-10-23 PROCEDURE — 7100000011 HC PHASE II RECOVERY - ADDTL 15 MIN

## 2023-10-23 PROCEDURE — 2709999900 CT NEEDLE BIOPSY LUNG PERCUTANEOUS W IMAGING GUIDANCE

## 2023-10-23 RX ORDER — DIPHENHYDRAMINE HYDROCHLORIDE 50 MG/ML
INJECTION INTRAMUSCULAR; INTRAVENOUS PRN
Status: COMPLETED | OUTPATIENT
Start: 2023-10-23 | End: 2023-10-23

## 2023-10-23 RX ORDER — MIDAZOLAM HYDROCHLORIDE 2 MG/2ML
INJECTION, SOLUTION INTRAMUSCULAR; INTRAVENOUS PRN
Status: COMPLETED | OUTPATIENT
Start: 2023-10-23 | End: 2023-10-23

## 2023-10-23 RX ORDER — FENTANYL CITRATE 50 UG/ML
INJECTION, SOLUTION INTRAMUSCULAR; INTRAVENOUS PRN
Status: COMPLETED | OUTPATIENT
Start: 2023-10-23 | End: 2023-10-23

## 2023-10-23 RX ADMIN — FENTANYL CITRATE 25 MCG: 50 INJECTION, SOLUTION INTRAMUSCULAR; INTRAVENOUS at 09:51

## 2023-10-23 RX ADMIN — MIDAZOLAM HYDROCHLORIDE 0.5 MG: 1 INJECTION, SOLUTION INTRAMUSCULAR; INTRAVENOUS at 09:58

## 2023-10-23 RX ADMIN — DIPHENHYDRAMINE HYDROCHLORIDE 25 MG: 50 INJECTION, SOLUTION INTRAMUSCULAR; INTRAVENOUS at 09:51

## 2023-10-23 RX ADMIN — MIDAZOLAM HYDROCHLORIDE 0.5 MG: 1 INJECTION, SOLUTION INTRAMUSCULAR; INTRAVENOUS at 09:51

## 2023-10-23 RX ADMIN — FENTANYL CITRATE 25 MCG: 50 INJECTION, SOLUTION INTRAMUSCULAR; INTRAVENOUS at 09:58

## 2023-10-23 NOTE — BRIEF OP NOTE
Brief Postoperative Note      Patient: Corey Ch  YOB: 1955  MRN: [id number]    Date of Procedure: 10/23/2023    Right lung mass    Post-Op Diagnosis: Same       Ct guided lung biopsy    EL Martinez    Assistant:  * No surgical staff found *    Anesthesia: *Moderate sedation    Estimated Blood Loss (mL): Minimal    Complications: None    Specimens:   Right lung    Implants:  * No implants in log *      Drains: * No LDAs found *    Findings: 20 G cores      Electronically signed by Farooq Delcid MD on 10/23/2023 at 10:13 AM

## 2023-10-23 NOTE — PRE SEDATION
Sedation Pre-Procedure Note    Patient Name: Renea Salazar   YOB: 1955  Room/Bed: Room/bed info not found  Medical Record Number: [id number]  Date: 10/23/2023   Time: 10:12 AM       Indication:  right lung nodule    Consent: I have discussed with the patient and/or the patient representative the indication, alternatives, and the possible risks and/or complications of the planned procedure and the anesthesia methods. The patient and/or patient representative appear to understand and agree to proceed. Vital Signs:   Vitals:    10/23/23 1004   BP:    Pulse: 77   Resp: 15   SpO2: 97%       Past Medical History:   has a past medical history of Anxiety, Arthritis, Depression, and Sjogren's disease ([address]). Past Surgical History:   has a past surgical history that includes Dilation and curettage of uterus (1984); Cataract removal (Left); Shoulder arthroscopy (Right, 04/29/2015); Total hip arthroplasty (Right, 3/9/2021); and Total hip arthroplasty (Left, 7/31/2023). Medications:   Scheduled Meds:   Continuous Infusions:   PRN Meds:   Home Meds:   Prior to Admission medications    Medication Sig Start Date End Date Taking? Authorizing Provider   gabapentin (NEURONTIN) 100 MG capsule Take 1 capsule by mouth 2 times daily for 30 days.  Intended supply: 90 days 10/18/23 11/17/23  Rubi GOODMAN, DO   diclofenac sodium (VOLTAREN) 1 % GEL Apply 4 g topically 4 times daily 10/18/23   Rigoberto Grossman DO   pantoprazole (PROTONIX) 40 MG tablet Take 1 tablet by mouth daily 8/30/23 9/29/23  Mary GRAY PA-C   aspirin (LYUBOV ASPIRIN) 325 MG tablet Take 1 tablet by mouth in the morning and at bedtime 7/31/23 8/30/23  Rigoberto Grossman DO   Artificial Saliva (XEROSTOMIA RELIEF SPRAY) SOLN Take 1 spray by mouth 2 times daily as needed (dry mouth) 9/22/22   Jeanie Pinon DO   Cholecalciferol (VITAMIN D3) 25 MCG (1000 UT) CAPS Take 2 capsules by mouth daily     Provider, MD Arely   venlafaxine

## 2023-10-23 NOTE — PROCEDURES
21  Patient returned from procedure. Dressing checked, clean, dry, and intact. Patient stable. No s/s of complications noted or reported. Vitals will be checked q 15min, see flow sheets. CXR 1130 completed, and cleared by Dr. Cruz Camara [phone]    1200 Patient eating and drinking well with no s/s of complications noted or reported. 1230 Patient discharged, site was checked with every set of vitals. Site clean dry and intact. Discharge papers reviewed with patient, questions answered, discharge paper signed. Patient taken to door via ambulation. Patient in stable condition, no s/s of complications noted or reported.

## 2023-10-27 LAB — SURGICAL PATHOLOGY REPORT: NORMAL

## 2023-11-07 ENCOUNTER — OFFICE VISIT (OUTPATIENT)
Dept: RHEUMATOLOGY | Age: 68
End: 2023-11-07
Payer: MEDICARE

## 2023-11-07 VITALS — HEIGHT: 64 IN | BODY MASS INDEX: 26.8 KG/M2 | WEIGHT: 157 LBS

## 2023-11-07 DIAGNOSIS — E85.89 OTHER AMYLOIDOSIS (HCC): ICD-10-CM

## 2023-11-07 DIAGNOSIS — M35.01 SJOGREN'S SYNDROME WITH KERATOCONJUNCTIVITIS SICCA (HCC): Primary | Chronic | ICD-10-CM

## 2023-11-07 PROCEDURE — 99214 OFFICE O/P EST MOD 30 MIN: CPT | Performed by: INTERNAL MEDICINE

## 2023-11-07 PROCEDURE — G8399 PT W/DXA RESULTS DOCUMENT: HCPCS | Performed by: INTERNAL MEDICINE

## 2023-11-07 PROCEDURE — G8427 DOCREV CUR MEDS BY ELIG CLIN: HCPCS | Performed by: INTERNAL MEDICINE

## 2023-11-07 PROCEDURE — G8484 FLU IMMUNIZE NO ADMIN: HCPCS | Performed by: INTERNAL MEDICINE

## 2023-11-07 PROCEDURE — 3017F COLORECTAL CA SCREEN DOC REV: CPT | Performed by: INTERNAL MEDICINE

## 2023-11-07 PROCEDURE — 1090F PRES/ABSN URINE INCON ASSESS: CPT | Performed by: INTERNAL MEDICINE

## 2023-11-07 PROCEDURE — G8417 CALC BMI ABV UP PARAM F/U: HCPCS | Performed by: INTERNAL MEDICINE

## 2023-11-07 PROCEDURE — 1036F TOBACCO NON-USER: CPT | Performed by: INTERNAL MEDICINE

## 2023-11-07 PROCEDURE — 1123F ACP DISCUSS/DSCN MKR DOCD: CPT | Performed by: INTERNAL MEDICINE

## 2023-11-07 ASSESSMENT — ENCOUNTER SYMPTOMS
SHORTNESS OF BREATH: 0
COUGH: 0
NAUSEA: 0
ABDOMINAL PAIN: 0
VOMITING: 0
COLOR CHANGE: 0
TROUBLE SWALLOWING: 0
DIARRHEA: 0

## 2023-11-07 NOTE — PROGRESS NOTES
LABGLOM >60 10/23/2023    GFRAA >60 09/22/2022     No results found for: \"BE\"  No results found for: \"RHEUMFACTOR\"  Lab Results   Component Value Date    SEDRATE 95 (H) 04/11/2023     Lab Results   Component Value Date    CRP 0.3 04/11/2023            An electronic signature was used to authenticate this note. This note was generated with a voice recognition dictation system. Please disregard any errors or omission which have escaped my review.     --Te Ann, DO

## 2023-11-10 ENCOUNTER — APPOINTMENT (OUTPATIENT)
Dept: PRIMARY CARE | Facility: CLINIC | Age: 68
End: 2023-11-10
Payer: MEDICARE

## 2023-11-15 ENCOUNTER — OFFICE VISIT (OUTPATIENT)
Dept: ORTHOPEDIC SURGERY | Age: 68
End: 2023-11-15

## 2023-11-15 DIAGNOSIS — Z96.642 S/P TOTAL LEFT HIP ARTHROPLASTY: ICD-10-CM

## 2023-11-15 DIAGNOSIS — Z96.642 S/P TOTAL LEFT HIP ARTHROPLASTY: Primary | ICD-10-CM

## 2023-11-15 NOTE — PROGRESS NOTES
Follow Up Post Operative Visit     Surgery: Left total hip arthroplasty  Date: 7/31/2023    Subjective:    Yu Ramos is here for follow up visit s/p above procedure. She is doing very well. All of her pain resolved and she feels that she is not had any surgery at all. She is ambulating without issue. Her lung biopsy was negative for cancer. Apparently she has been diagnosed with amyloidosis but she is doing much better and in good spirits        Controlled Substances Monitoring:        Physical Exam:    No data recorded    General: Alert and oriented x3, no acute distress  Cardiovascular/pulmonary: No labored breathing, peripheral perfusion intact  Musculoskeletal:    Left hip: Her incision is healed appropriately with good scar formation. Her scar sensitivity has resolved. She has no pain with range of motion of her hip. On standing single-leg testing she has no Trendelenburg sign. Her antalgic gait is resolved. She has full hip range of motion without pain. Thigh soft compressible. Knee range of motion is full with 5 out of 5 strength. She is ambulating with a cane. Ankle plantar dorsiflexion intact      Imaging: AP pelvis and multiple views of the left hip demonstrate well-placed total hip arthroplasty without signs of complication    Assessment and Plan: Approximately 5 months status post left total hip arthroplasty  -All of her symptoms have resolved. Her lung biopsy was negative for cancer. She is doing very well and is very pleased with the results. At this point she can follow-up with me annually for surveillance. Machelle Saba DO   Orthopaedic Surgery   11/15/23  10:39 AM    Note: This report was completed using "[employer] voiced recognition software. Every effort has been made to ensure accuracy; however, inadvertent computerized transcription errors may be present.

## 2023-11-16 RX ORDER — GABAPENTIN 100 MG/1
300 CAPSULE ORAL 3 TIMES DAILY
Qty: 270 CAPSULE | Refills: 0 | Status: SHIPPED | OUTPATIENT
Start: 2023-11-16 | End: 2023-12-16

## 2023-11-27 ENCOUNTER — LAB (OUTPATIENT)
Dept: LAB | Facility: LAB | Age: 68
End: 2023-11-27
Payer: MEDICARE

## 2023-11-27 ENCOUNTER — OFFICE VISIT (OUTPATIENT)
Dept: RHEUMATOLOGY | Facility: CLINIC | Age: 68
End: 2023-11-27
Payer: MEDICARE

## 2023-11-27 VITALS
SYSTOLIC BLOOD PRESSURE: 128 MMHG | HEIGHT: 64 IN | TEMPERATURE: 98.2 F | WEIGHT: 157 LBS | HEART RATE: 68 BPM | DIASTOLIC BLOOD PRESSURE: 72 MMHG | BODY MASS INDEX: 26.8 KG/M2

## 2023-11-27 DIAGNOSIS — D64.9 ANEMIA, UNSPECIFIED TYPE: ICD-10-CM

## 2023-11-27 DIAGNOSIS — E85.9 AMYLOIDOSIS, UNSPECIFIED TYPE (MULTI): ICD-10-CM

## 2023-11-27 DIAGNOSIS — M35.01 SJOGREN'S SYNDROME WITH KERATOCONJUNCTIVITIS SICCA (MULTI): ICD-10-CM

## 2023-11-27 DIAGNOSIS — M35.01 SJOGREN'S SYNDROME WITH KERATOCONJUNCTIVITIS SICCA (MULTI): Primary | ICD-10-CM

## 2023-11-27 LAB
ALBUMIN SERPL BCP-MCNC: 3.7 G/DL (ref 3.4–5)
ALP SERPL-CCNC: 88 U/L (ref 33–136)
ALT SERPL W P-5'-P-CCNC: 8 U/L (ref 7–45)
ANION GAP SERPL CALC-SCNC: 14 MMOL/L (ref 10–20)
AST SERPL W P-5'-P-CCNC: 20 U/L (ref 9–39)
BILIRUB SERPL-MCNC: 0.4 MG/DL (ref 0–1.2)
BUN SERPL-MCNC: 8 MG/DL (ref 6–23)
C3 SERPL-MCNC: 129 MG/DL (ref 87–200)
C4 SERPL-MCNC: 19 MG/DL (ref 10–50)
CALCIUM SERPL-MCNC: 9.1 MG/DL (ref 8.6–10.6)
CCP IGG SERPL-ACNC: <1 U/ML
CHLORIDE SERPL-SCNC: 106 MMOL/L (ref 98–107)
CO2 SERPL-SCNC: 25 MMOL/L (ref 21–32)
CREAT SERPL-MCNC: 0.82 MG/DL (ref 0.5–1.05)
CREAT UR-MCNC: 57 MG/DL (ref 20–320)
CRP SERPL-MCNC: 1.87 MG/DL
ERYTHROCYTE [DISTWIDTH] IN BLOOD BY AUTOMATED COUNT: 13.9 % (ref 11.5–14.5)
ERYTHROCYTE [SEDIMENTATION RATE] IN BLOOD BY WESTERGREN METHOD: 26 MM/H (ref 0–30)
FERRITIN SERPL-MCNC: 19 NG/ML (ref 8–150)
GFR SERPL CREATININE-BSD FRML MDRD: 79 ML/MIN/1.73M*2
GLUCOSE SERPL-MCNC: 84 MG/DL (ref 74–99)
HBV SURFACE AB SER-ACNC: 3.1 MIU/ML
HBV SURFACE AG SERPL QL IA: NONREACTIVE
HCT VFR BLD AUTO: 34 % (ref 36–46)
HCV AB SER QL: NONREACTIVE
HGB BLD-MCNC: 10.5 G/DL (ref 12–16)
HIV 1+2 AB+HIV1 P24 AG SERPL QL IA: NONREACTIVE
IGA SERPL-MCNC: 256 MG/DL (ref 70–400)
IGG SERPL-MCNC: 3540 MG/DL (ref 700–1600)
IGM SERPL-MCNC: 184 MG/DL (ref 40–230)
IRON SATN MFR SERPL: 12 % (ref 25–45)
IRON SERPL-MCNC: 52 UG/DL (ref 35–150)
LDH SERPL L TO P-CCNC: 133 U/L (ref 84–246)
MCH RBC QN AUTO: 25.8 PG (ref 26–34)
MCHC RBC AUTO-ENTMCNC: 30.9 G/DL (ref 32–36)
MCV RBC AUTO: 84 FL (ref 80–100)
NRBC BLD-RTO: 0 /100 WBCS (ref 0–0)
PLATELET # BLD AUTO: 232 X10*3/UL (ref 150–450)
POTASSIUM SERPL-SCNC: 4.2 MMOL/L (ref 3.5–5.3)
PROT SERPL-MCNC: 8.4 G/DL (ref 6.4–8.2)
PROT SERPL-MCNC: 8.4 G/DL (ref 6.4–8.2)
PROT UR-ACNC: 10 MG/DL (ref 5–24)
PROT UR-ACNC: 10 MG/DL (ref 5–25)
PROT/CREAT UR: 0.18 MG/MG CREAT (ref 0–0.17)
RBC # BLD AUTO: 4.07 X10*6/UL (ref 4–5.2)
RHEUMATOID FACT SER NEPH-ACNC: 58 IU/ML (ref 0–15)
SODIUM SERPL-SCNC: 141 MMOL/L (ref 136–145)
TIBC SERPL-MCNC: 421 UG/DL (ref 240–445)
TSH SERPL-ACNC: 3.17 MIU/L (ref 0.44–3.98)
UIBC SERPL-MCNC: 369 UG/DL (ref 110–370)
WBC # BLD AUTO: 5.3 X10*3/UL (ref 4.4–11.3)

## 2023-11-27 PROCEDURE — 86225 DNA ANTIBODY NATIVE: CPT

## 2023-11-27 PROCEDURE — 83010 ASSAY OF HAPTOGLOBIN QUANT: CPT

## 2023-11-27 PROCEDURE — 86235 NUCLEAR ANTIGEN ANTIBODY: CPT

## 2023-11-27 PROCEDURE — 85027 COMPLETE CBC AUTOMATED: CPT

## 2023-11-27 PROCEDURE — 86706 HEP B SURFACE ANTIBODY: CPT

## 2023-11-27 PROCEDURE — 1036F TOBACCO NON-USER: CPT | Performed by: STUDENT IN AN ORGANIZED HEALTH CARE EDUCATION/TRAINING PROGRAM

## 2023-11-27 PROCEDURE — 86036 ANCA SCREEN EACH ANTIBODY: CPT

## 2023-11-27 PROCEDURE — 83540 ASSAY OF IRON: CPT

## 2023-11-27 PROCEDURE — 83516 IMMUNOASSAY NONANTIBODY: CPT

## 2023-11-27 PROCEDURE — 99205 OFFICE O/P NEW HI 60 MIN: CPT | Performed by: STUDENT IN AN ORGANIZED HEALTH CARE EDUCATION/TRAINING PROGRAM

## 2023-11-27 PROCEDURE — 80053 COMPREHEN METABOLIC PANEL: CPT

## 2023-11-27 PROCEDURE — 85652 RBC SED RATE AUTOMATED: CPT

## 2023-11-27 PROCEDURE — 36415 COLL VENOUS BLD VENIPUNCTURE: CPT

## 2023-11-27 PROCEDURE — 83521 IG LIGHT CHAINS FREE EACH: CPT

## 2023-11-27 PROCEDURE — 83550 IRON BINDING TEST: CPT

## 2023-11-27 PROCEDURE — 86140 C-REACTIVE PROTEIN: CPT

## 2023-11-27 PROCEDURE — 86038 ANTINUCLEAR ANTIBODIES: CPT

## 2023-11-27 PROCEDURE — 86481 TB AG RESPONSE T-CELL SUSP: CPT

## 2023-11-27 PROCEDURE — 84166 PROTEIN E-PHORESIS/URINE/CSF: CPT

## 2023-11-27 PROCEDURE — 84165 PROTEIN E-PHORESIS SERUM: CPT

## 2023-11-27 PROCEDURE — 84156 ASSAY OF PROTEIN URINE: CPT

## 2023-11-27 PROCEDURE — 87340 HEPATITIS B SURFACE AG IA: CPT

## 2023-11-27 PROCEDURE — 86704 HEP B CORE ANTIBODY TOTAL: CPT

## 2023-11-27 PROCEDURE — 86803 HEPATITIS C AB TEST: CPT

## 2023-11-27 PROCEDURE — 82728 ASSAY OF FERRITIN: CPT

## 2023-11-27 PROCEDURE — 82570 ASSAY OF URINE CREATININE: CPT

## 2023-11-27 PROCEDURE — 86431 RHEUMATOID FACTOR QUANT: CPT

## 2023-11-27 PROCEDURE — 83615 LACTATE (LD) (LDH) ENZYME: CPT

## 2023-11-27 PROCEDURE — 87389 HIV-1 AG W/HIV-1&-2 AB AG IA: CPT

## 2023-11-27 PROCEDURE — 1159F MED LIST DOCD IN RCRD: CPT | Performed by: STUDENT IN AN ORGANIZED HEALTH CARE EDUCATION/TRAINING PROGRAM

## 2023-11-27 PROCEDURE — 1126F AMNT PAIN NOTED NONE PRSNT: CPT | Performed by: STUDENT IN AN ORGANIZED HEALTH CARE EDUCATION/TRAINING PROGRAM

## 2023-11-27 PROCEDURE — 86200 CCP ANTIBODY: CPT

## 2023-11-27 PROCEDURE — 84443 ASSAY THYROID STIM HORMONE: CPT

## 2023-11-27 PROCEDURE — 82784 ASSAY IGA/IGD/IGG/IGM EACH: CPT

## 2023-11-27 PROCEDURE — 86160 COMPLEMENT ANTIGEN: CPT

## 2023-11-27 RX ORDER — VIT C/E/ZN/COPPR/LUTEIN/ZEAXAN 250MG-90MG
25 CAPSULE ORAL DAILY
COMMUNITY

## 2023-11-27 RX ORDER — PANTOPRAZOLE SODIUM 40 MG/1
40 TABLET, DELAYED RELEASE ORAL
COMMUNITY
Start: 2023-08-30

## 2023-11-27 RX ORDER — GABAPENTIN 100 MG/1
CAPSULE ORAL
COMMUNITY
Start: 2023-11-16

## 2023-11-27 RX ORDER — VENLAFAXINE 25 MG/1
25 TABLET ORAL DAILY
COMMUNITY

## 2023-11-27 ASSESSMENT — PAIN SCALES - GENERAL: PAINLEVEL: 0-NO PAIN

## 2023-11-27 NOTE — PROGRESS NOTES
Subjective   Patient ID: [id number]   Bethany Cuenca is a 68 y.o. female who presents for Lung Disease (New patient).    69 yo female, hx of bilateral hip replacement, gastric ulcer, with Sjogren sd diagnosis in 2011 is for new incidental finding of amyloidosis on lung biopsy.     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  Only on symptomatic ttt for sicca sxs, no HCQ or other therapies.  No ocular sxs besides dry eyes, prescribed low dose naltroxone?  Dental carries. Intermittent arthralgias, not inflammatory. Mostly in the hands. No numbness, tinglining.  No skin rash. No weakness. No SOB.  Cough for years,green sputum. Getting slightly  worse. Recurrent sinus infections. Resolves with antibitiocs.   Trouble swallowing. Chronic. To food.   Diarrhea occaisional.  No mouth or nose ulcers.   Miscarriage once, first trimester.  No hx of blood clots  No weight loss.   No glandular swelling.  she has not seen much benefit from cevimeline, Salagen, or xerostomia spray.   Patient was incidentally noted to have a right upper lobe nodule, with lung biopsy revealing amyloidosis, without malignancy. No further identification was done on the biopsy.     Family hx: no amyloidosis or rheumatic disease  Non smoker no alcohol , no drug use.      CT angio Chest  FINDINGS:   Pulmonary Arteries: Pulmonary arteries are adequately opacified for   evaluation.  No evidence of intraluminal filling defect to suggest pulmonary   embolism.  Main pulmonary artery is normal in caliber.     Mediastinum: No evidence of mediastinal lymphadenopathy.  The heart and   pericardium demonstrate no acute abnormality.  There is no acute abnormality   of the thoracic aorta.     Lungs/pleura: There is no pulmonary infiltrate     There is a curvilinear masslike opacity seen within the right upper lobe   abutting the minor fissure.  The curvilinear masslike opacity  measures 1.4 cm   x 1.6 cm x 0.6 cm.  The borders are irregular as demonstrated on the axial   and sagittal images.  While this could represent a Cesilia fissural lymph node   the size of the soft tissue opacity and irregular borders are worrisome for   underlying malignancy.  Dedicated PET-CT scan is recommended.     The left lung is clear.     Upper Abdomen: Limited images of the upper abdomen are unremarkable.     Soft Tissues/Bones: No acute bone or soft tissue abnormality.     PET-CT  Impression    1.  Irregular nodular density in the right upper lobe abutting the right  minor fissure demonstrates focal hypermetabolic activity on PET; malignancy  is not excluded.  Tissue diagnosis is recommended.    No definite hypermetabolic mediastinal or hilar lymphadenopathy.    2.  Bilateral hip arthroplasties.  Heterogeneous activity along the left hip  arthroplasty associated with mild subcutaneous stranding and uptake in the  lateral left hip could be postsurgical/inflammatory.  Clinical correlation is  suggested.    3.  Mild asymmetric increased activity is noted in the left tonsillar region  for which correlation with clinical examination is recommended.      1/25/11    Rheumatoid factor - 111  SUDHIR - 516   SSA - positive  SSB positive  Negative:  Anti - RNP, Am, Scl70, Jo1, centromere, histone  C3 133 []  C4 20 [10-40]      Objective       Objective  There were no vitals filed for this visit.    Physical Exam  General: She is not in acute distress.  HENT:  Head: Normocephalic and atraumatic.  Nose: Nose normal.  Mouth/Throat:  Comments: She has poor salivary pooling  Eyes:  General: No scleral icterus.  Pulmonary:  Effort: Pulmonary effort is normal.  Musculoskeletal:  General: No swelling, tenderness or deformity.  Skin:  General: Skin is warm and dry.  Findings: No rash.  Neurological:  General: No focal deficit present.  Mental Status: She is alert and oriented to person, place, and time. Mental status is at  baseline.  Psychiatric:  Mood and Affect: Mood normal.  Behavior: Behavior normal.    Assessment/Plan   67 yo female, hx of bilateral hip replacement, gastric ulcer, with Sjogren sd diagnosis in 2011 is for new incidental finding of amyloidosis on lung biopsy.   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  Had no extraglandular manifestation of SS.  Incidentally found to have right upper lobe nodule, biopsy revealed amyloidosis, no further staining was done on the tissue to identify which amyloid subtype.   Besides chronic productive cough, sicca sxs and recurrent sinusitis (resolves with antibiotics) she doesn't have systemic signs or symptoms of Sjogren or amyloidosis.      At this point, further workup is needed to rule out systemic amyloidosis and try to differentiate AA from AL and other subtypes of amyloidosis, before concluding that the lung is the only organ affected and no therapy is needed.   Based on the prior CT C/A/P and PET-CT, there is no organomegaly or major uptake besides the lungs.    She has a history of M spike in 2011 that resolved on more recent workup. Will check for SPEP, UPEP, free light chains, IG, Serum amyloid A, UA, UPCR, and autoimmune workup( most of that was done in the past however we will repeat for completion).  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 https://pubmed.ncbi.nlm.nih.gov/95982176/. And she will require regular CT chest follow up without immunosuppression if she continues to be clinically stable.    RTC in 3 months      Patient seen with Dr. Josue Carrillo MD   Rheumatology Fellow,PGY-V

## 2023-11-28 DIAGNOSIS — E85.9 AMYLOIDOSIS, UNSPECIFIED TYPE (MULTI): Primary | ICD-10-CM

## 2023-11-28 LAB
HAPTOGLOB SERPL-MCNC: 184 MG/DL (ref 37–246)
HBV CORE AB SER QL: NONREACTIVE
KAPPA LC SERPL-MCNC: 7.06 MG/DL (ref 0.33–1.94)
KAPPA LC/LAMBDA SER: 1.52 {RATIO} (ref 0.26–1.65)
LAMBDA LC SERPL-MCNC: 4.63 MG/DL (ref 0.57–2.63)

## 2023-11-29 LAB
ALBUMIN MFR UR ELPH: 40.2 %
ALBUMIN: 3.6 G/DL (ref 3.4–5)
ALPHA 1 GLOBULIN: 0.3 G/DL (ref 0.2–0.6)
ALPHA 2 GLOBULIN: 0.7 G/DL (ref 0.4–1.1)
ALPHA1 GLOB MFR UR ELPH: 6.8 %
ALPHA2 GLOB MFR UR ELPH: 11.8 %
B-GLOBULIN MFR UR ELPH: 14.5 %
BETA GLOBULIN: 0.9 G/DL (ref 0.5–1.2)
GAMMA GLOB MFR UR ELPH: 26.7 %
GAMMA GLOBULIN: 2.9 G/DL (ref 0.5–1.4)
NIL(NEG) CONTROL SPOT COUNT: NORMAL
PANEL A SPOT COUNT: 1
PANEL B SPOT COUNT: 2
PATH REVIEW-SERUM PROTEIN ELECTROPHORESIS: ABNORMAL
PATH REVIEW-URINE PROTEIN ELECTROPHORESIS: NORMAL
POS CONTROL SPOT COUNT: NORMAL
PROTEIN ELECTROPHORESIS COMMENT: ABNORMAL
T-SPOT. TB INTERPRETATION: NEGATIVE
URINE ELECTROPHORESIS COMMENT: NORMAL

## 2023-11-30 LAB
ANCA AB PATTERN SER IF-IMP: NORMAL
ANCA IGG TITR SER IF: NORMAL {TITER}
MYELOPEROXIDASE AB SER-ACNC: 0 AU/ML (ref 0–19)
PROTEINASE3 AB SER-ACNC: 1 AU/ML (ref 0–19)

## 2023-12-02 LAB
ANA PATTERN: ABNORMAL
ANA SER QL HEP2 SUBST: POSITIVE
ANA TITR SER IF: ABNORMAL {TITER}
CENTROMERE B AB SER-ACNC: <0.2 AI
CHROMATIN AB SERPL-ACNC: 0.2 AI
DSDNA AB SER-ACNC: <1 IU/ML
ENA JO1 AB SER QL IA: <0.2 AI
ENA RNP AB SER IA-ACNC: 3.5 AI
ENA SCL70 AB SER QL IA: <0.2 AI
ENA SM AB SER IA-ACNC: 0.4 AI
ENA SM+RNP AB SER QL IA: <0.2 AI
ENA SS-A AB SER IA-ACNC: >8 AI
ENA SS-B AB SER IA-ACNC: >8 AI
RIBOSOMAL P AB SER-ACNC: <0.2 AI

## 2023-12-05 RX ORDER — FERROUS SULFATE 325(65) MG
1 TABLET, DELAYED RELEASE (ENTERIC COATED) ORAL EVERY OTHER DAY
Qty: 30 TABLET | Refills: 1 | Status: SHIPPED | OUTPATIENT
Start: 2023-12-05 | End: 2024-12-04

## 2023-12-22 ENCOUNTER — TELEPHONE (OUTPATIENT)
Dept: RHEUMATOLOGY | Facility: CLINIC | Age: 68
End: 2023-12-22

## 2023-12-22 ENCOUNTER — LAB (OUTPATIENT)
Dept: LAB | Facility: LAB | Age: 68
End: 2023-12-22
Payer: MEDICARE

## 2023-12-22 DIAGNOSIS — E85.9 AMYLOIDOSIS, UNSPECIFIED TYPE (MULTI): ICD-10-CM

## 2023-12-22 LAB
APPEARANCE UR: CLEAR
BILIRUB UR STRIP.AUTO-MCNC: NEGATIVE MG/DL
COLOR UR: YELLOW
CREAT UR-MCNC: 142 MG/DL (ref 20–320)
CRP SERPL-MCNC: 0.31 MG/DL
GLUCOSE UR STRIP.AUTO-MCNC: NEGATIVE MG/DL
HYALINE CASTS #/AREA URNS AUTO: ABNORMAL /LPF
KETONES UR STRIP.AUTO-MCNC: NEGATIVE MG/DL
LEUKOCYTE ESTERASE UR QL STRIP.AUTO: ABNORMAL
MUCOUS THREADS #/AREA URNS AUTO: ABNORMAL /LPF
NITRITE UR QL STRIP.AUTO: NEGATIVE
PH UR STRIP.AUTO: 5 [PH]
PROT UR STRIP.AUTO-MCNC: NEGATIVE MG/DL
RBC # UR STRIP.AUTO: NEGATIVE /UL
RBC #/AREA URNS AUTO: ABNORMAL /HPF
SP GR UR STRIP.AUTO: 1.02
UROBILINOGEN UR STRIP.AUTO-MCNC: <2 MG/DL
WBC #/AREA URNS AUTO: ABNORMAL /HPF

## 2023-12-22 PROCEDURE — 36415 COLL VENOUS BLD VENIPUNCTURE: CPT

## 2023-12-22 PROCEDURE — 86140 C-REACTIVE PROTEIN: CPT

## 2023-12-22 PROCEDURE — 81001 URINALYSIS AUTO W/SCOPE: CPT

## 2023-12-22 PROCEDURE — 82570 ASSAY OF URINE CREATININE: CPT

## 2024-01-19 NOTE — PROGRESS NOTES
[facility]   Phone: [phone]   Fax: [phone]    Discharge Summary        REASON FOR DISCHARGE: Pt elected to self d/c on 10/5/2023    PATIENT EDUCATION/INSTRUCTIONS: continue HEP as instructed    RECOMMENDATIONS: call c questions or if additional services are warranted.      Thank you for the opportunity to work with your patient. If you have questions or comments, please contact me at numbers listed above.      Fransisco Huang, PT PT , DPT PT [id number] 1/19/2024

## 2024-05-12 NOTE — PROGRESS NOTES
Subjective   Patient ID: [id number]   Bethany Cuenca is a 69 y.o. female who presents for No chief complaint on file..    69 yo female, hx of bilateral hip replacement, gastric ulcer, with Sjogren sd diagnosis in 2011 is for new incidental finding of amyloidosis on lung biopsy.       Interval hx:  Once or twice a week develops sharp pain on the right side that lasts for 30 seconds.   No SOB, no cough, no hemoptysis. No fever, no weight changes, no night sweats.  Fungal infection in the right foot.   Reports darker urine with an odor.   No joint pain. No headaches.  No glandular swelling. Still has sicca sxs. Takes naltroxone.   Has a lot of dental cavities.     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  Only on symptomatic ttt for sicca sxs, no HCQ or other therapies.  No ocular sxs besides dry eyes, prescribed low dose naltroxone?  Dental carries. Intermittent arthralgias, not inflammatory. Mostly in the hands. No numbness, tinglining.  No skin rash. No weakness. No SOB.  Cough for years,green sputum. Getting slightly  worse. Recurrent sinus infections. Resolves with antibitiocs.   Trouble swallowing. Chronic. To food.   Diarrhea occaisional.  No mouth or nose ulcers.   Miscarriage once, first trimester.  No hx of blood clots  No weight loss.   No glandular swelling.  she has not seen much benefit from cevimeline, Salagen, or xerostomia spray.   Patient was incidentally noted to have a right upper lobe nodule, with lung biopsy revealing amyloidosis, without malignancy. No further identification was done on the biopsy.     Family hx: no amyloidosis or rheumatic disease  Non smoker no alcohol , no drug use.      CT angio Chest  FINDINGS:   Pulmonary Arteries: Pulmonary arteries are adequately opacified for   evaluation.  No evidence of intraluminal filling defect to suggest pulmonary   embolism.  Main pulmonary artery is normal  in caliber.     Mediastinum: No evidence of mediastinal lymphadenopathy.  The heart and   pericardium demonstrate no acute abnormality.  There is no acute abnormality   of the thoracic aorta.     Lungs/pleura: There is no pulmonary infiltrate     There is a curvilinear masslike opacity seen within the right upper lobe   abutting the minor fissure.  The curvilinear masslike opacity measures 1.4 cm   x 1.6 cm x 0.6 cm.  The borders are irregular as demonstrated on the axial   and sagittal images.  While this could represent a Cesilia fissural lymph node   the size of the soft tissue opacity and irregular borders are worrisome for   underlying malignancy.  Dedicated PET-CT scan is recommended.     The left lung is clear.     Upper Abdomen: Limited images of the upper abdomen are unremarkable.     Soft Tissues/Bones: No acute bone or soft tissue abnormality.     PET-CT  Impression    1.  Irregular nodular density in the right upper lobe abutting the right  minor fissure demonstrates focal hypermetabolic activity on PET; malignancy  is not excluded.  Tissue diagnosis is recommended.    No definite hypermetabolic mediastinal or hilar lymphadenopathy.    2.  Bilateral hip arthroplasties.  Heterogeneous activity along the left hip  arthroplasty associated with mild subcutaneous stranding and uptake in the  lateral left hip could be postsurgical/inflammatory.  Clinical correlation is  suggested.    3.  Mild asymmetric increased activity is noted in the left tonsillar region  for which correlation with clinical examination is recommended.      1/25/11    Rheumatoid factor - 111  SUDHIR - 516   SSA - positive  SSB positive  Negative:  Anti - RNP, Am, Scl70, Jo1, centromere, histone  C3 133 []  C4 20 [10-40]      Objective       Objective  There were no vitals filed for this visit.    Physical Exam  General: She is not in acute distress.  HENT:  Head: Normocephalic and atraumatic.  Nose: Nose normal.  Mouth/Throat:  Comments:  She has poor salivary pooling  Eyes:  General: No scleral icterus.  Pulmonary:  Effort: Pulmonary effort is normal.  Musculoskeletal:  General: No swelling, tenderness or deformity.  Skin:  General: Skin is warm and dry.  Findings: No rash besides fungal infection over the foot  Neurological:  General: No focal deficit present.  Mental Status: She is alert and oriented to person, place, and time. Mental status is at baseline.  Psychiatric:  Mood and Affect: Mood normal.  Behavior: Behavior normal.    Assessment/Plan   67 yo female, hx of bilateral hip replacement, gastric ulcer, with Sjogren sd diagnosis in 2011 is for new incidental finding of amyloidosis on lung biopsy.   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  Had no extraglandular manifestation of SS.  Incidentally found to have right upper lobe nodule, biopsy revealed amyloidosis, no further staining was done on the tissue to identify which amyloid subtype.   Besides chronic productive cough, sicca sxs and recurrent sinusitis (resolves with antibiotics) she doesn't have systemic signs or symptoms of Sjogren or amyloidosis.      Based on the prior CT C/A/P and PET-CT, there is no organomegaly or major uptake besides the lungs. She has a history of M spike in 2011 that resolved on more recent workup. Workup done is not suggestive of systemic Amyloidosis.  At this point, the lung is the only organ affected and no therapy is needed given that the patient is asymptomatic.   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 https://pubmed.ncbi.nlm.nih.gov/14011647/.   She will require regular CT chest follow up without immunosuppression if she continues to be clinically stable.    RTC in 1 year or sooner if needed  Patient seen with Dr. Robert Carrillo MD   Rheumatology Fellow,PGY-V

## 2024-05-13 ENCOUNTER — LAB (OUTPATIENT)
Dept: LAB | Facility: LAB | Age: 69
End: 2024-05-13
Payer: MEDICARE

## 2024-05-13 ENCOUNTER — OFFICE VISIT (OUTPATIENT)
Dept: RHEUMATOLOGY | Facility: CLINIC | Age: 69
End: 2024-05-13
Payer: MEDICARE

## 2024-05-13 VITALS
SYSTOLIC BLOOD PRESSURE: 128 MMHG | TEMPERATURE: 97.4 F | HEIGHT: 64 IN | WEIGHT: 156 LBS | DIASTOLIC BLOOD PRESSURE: 60 MMHG | BODY MASS INDEX: 26.63 KG/M2 | HEART RATE: 65 BPM

## 2024-05-13 DIAGNOSIS — M35.01 SJOGREN'S SYNDROME WITH KERATOCONJUNCTIVITIS SICCA (MULTI): ICD-10-CM

## 2024-05-13 DIAGNOSIS — D64.9 ANEMIA, UNSPECIFIED TYPE: ICD-10-CM

## 2024-05-13 DIAGNOSIS — E85.9 AMYLOIDOSIS, UNSPECIFIED TYPE (MULTI): ICD-10-CM

## 2024-05-13 LAB
ALBUMIN SERPL BCP-MCNC: 3.8 G/DL (ref 3.4–5)
ALP SERPL-CCNC: 96 U/L (ref 33–136)
ALT SERPL W P-5'-P-CCNC: 12 U/L (ref 7–45)
ANION GAP SERPL CALC-SCNC: 13 MMOL/L (ref 10–20)
APPEARANCE UR: CLEAR
AST SERPL W P-5'-P-CCNC: 18 U/L (ref 9–39)
BACTERIA #/AREA URNS AUTO: ABNORMAL /HPF
BILIRUB SERPL-MCNC: 0.3 MG/DL (ref 0–1.2)
BILIRUB UR STRIP.AUTO-MCNC: NEGATIVE MG/DL
BUN SERPL-MCNC: 14 MG/DL (ref 6–23)
C3 SERPL-MCNC: 135 MG/DL (ref 87–200)
C4 SERPL-MCNC: 25 MG/DL (ref 10–50)
CALCIUM SERPL-MCNC: 9.3 MG/DL (ref 8.6–10.6)
CHLORIDE SERPL-SCNC: 103 MMOL/L (ref 98–107)
CO2 SERPL-SCNC: 27 MMOL/L (ref 21–32)
COLOR UR: YELLOW
CREAT SERPL-MCNC: 0.76 MG/DL (ref 0.5–1.05)
CREAT UR-MCNC: 119.8 MG/DL (ref 20–320)
CRP SERPL-MCNC: 0.49 MG/DL
DSDNA AB SER-ACNC: 1 IU/ML
EGFRCR SERPLBLD CKD-EPI 2021: 86 ML/MIN/1.73M*2
ERYTHROCYTE [DISTWIDTH] IN BLOOD BY AUTOMATED COUNT: 15.2 % (ref 11.5–14.5)
ERYTHROCYTE [SEDIMENTATION RATE] IN BLOOD BY WESTERGREN METHOD: 23 MM/H (ref 0–30)
FERRITIN SERPL-MCNC: 24 NG/ML (ref 8–150)
FIBRINOGEN PPP-MCNC: 428 MG/DL (ref 200–400)
GLUCOSE SERPL-MCNC: 84 MG/DL (ref 74–99)
GLUCOSE UR STRIP.AUTO-MCNC: NORMAL MG/DL
HCT VFR BLD AUTO: 39.8 % (ref 36–46)
HGB BLD-MCNC: 11.9 G/DL (ref 12–16)
HGB RETIC QN: 31 PG (ref 28–38)
IMMATURE RETIC FRACTION: 8.7 %
IRON SATN MFR SERPL: 26 % (ref 25–45)
IRON SERPL-MCNC: 116 UG/DL (ref 35–150)
KETONES UR STRIP.AUTO-MCNC: NEGATIVE MG/DL
LDH SERPL L TO P-CCNC: 144 U/L (ref 84–246)
LEUKOCYTE ESTERASE UR QL STRIP.AUTO: ABNORMAL
MCH RBC QN AUTO: 26.5 PG (ref 26–34)
MCHC RBC AUTO-ENTMCNC: 29.9 G/DL (ref 32–36)
MCV RBC AUTO: 89 FL (ref 80–100)
MUCOUS THREADS #/AREA URNS AUTO: ABNORMAL /LPF
NITRITE UR QL STRIP.AUTO: ABNORMAL
NRBC BLD-RTO: 0 /100 WBCS (ref 0–0)
PH UR STRIP.AUTO: 5 [PH]
PLATELET # BLD AUTO: 250 X10*3/UL (ref 150–450)
POTASSIUM SERPL-SCNC: 4.9 MMOL/L (ref 3.5–5.3)
PROT SERPL-MCNC: 8.6 G/DL (ref 6.4–8.2)
PROT UR STRIP.AUTO-MCNC: NEGATIVE MG/DL
PROT UR-ACNC: 12 MG/DL (ref 5–24)
PROT/CREAT UR: 0.1 MG/MG CREAT (ref 0–0.17)
RBC # BLD AUTO: 4.49 X10*6/UL (ref 4–5.2)
RBC # UR STRIP.AUTO: NEGATIVE /UL
RBC #/AREA URNS AUTO: ABNORMAL /HPF
RETICS #: 0.06 X10*6/UL (ref 0.02–0.11)
RETICS/RBC NFR AUTO: 1.4 % (ref 0.5–2)
SODIUM SERPL-SCNC: 138 MMOL/L (ref 136–145)
SP GR UR STRIP.AUTO: 1.02
TIBC SERPL-MCNC: 452 UG/DL (ref 240–445)
UIBC SERPL-MCNC: 336 UG/DL (ref 110–370)
UROBILINOGEN UR STRIP.AUTO-MCNC: NORMAL MG/DL
WBC # BLD AUTO: 4.9 X10*3/UL (ref 4.4–11.3)
WBC #/AREA URNS AUTO: ABNORMAL /HPF

## 2024-05-13 PROCEDURE — 99214 OFFICE O/P EST MOD 30 MIN: CPT | Performed by: STUDENT IN AN ORGANIZED HEALTH CARE EDUCATION/TRAINING PROGRAM

## 2024-05-13 PROCEDURE — 80053 COMPREHEN METABOLIC PANEL: CPT

## 2024-05-13 PROCEDURE — 86160 COMPLEMENT ANTIGEN: CPT

## 2024-05-13 PROCEDURE — 1036F TOBACCO NON-USER: CPT | Performed by: STUDENT IN AN ORGANIZED HEALTH CARE EDUCATION/TRAINING PROGRAM

## 2024-05-13 PROCEDURE — 1159F MED LIST DOCD IN RCRD: CPT | Performed by: STUDENT IN AN ORGANIZED HEALTH CARE EDUCATION/TRAINING PROGRAM

## 2024-05-13 PROCEDURE — 83550 IRON BINDING TEST: CPT

## 2024-05-13 PROCEDURE — 36415 COLL VENOUS BLD VENIPUNCTURE: CPT

## 2024-05-13 PROCEDURE — 85045 AUTOMATED RETICULOCYTE COUNT: CPT

## 2024-05-13 PROCEDURE — 83615 LACTATE (LD) (LDH) ENZYME: CPT

## 2024-05-13 PROCEDURE — 1126F AMNT PAIN NOTED NONE PRSNT: CPT | Performed by: STUDENT IN AN ORGANIZED HEALTH CARE EDUCATION/TRAINING PROGRAM

## 2024-05-13 PROCEDURE — 85652 RBC SED RATE AUTOMATED: CPT

## 2024-05-13 PROCEDURE — 82728 ASSAY OF FERRITIN: CPT

## 2024-05-13 PROCEDURE — 86140 C-REACTIVE PROTEIN: CPT

## 2024-05-13 PROCEDURE — 82570 ASSAY OF URINE CREATININE: CPT

## 2024-05-13 PROCEDURE — 83540 ASSAY OF IRON: CPT

## 2024-05-13 PROCEDURE — 85027 COMPLETE CBC AUTOMATED: CPT

## 2024-05-13 PROCEDURE — 81001 URINALYSIS AUTO W/SCOPE: CPT

## 2024-05-13 PROCEDURE — 84156 ASSAY OF PROTEIN URINE: CPT

## 2024-05-13 PROCEDURE — 85384 FIBRINOGEN ACTIVITY: CPT

## 2024-05-13 PROCEDURE — 86225 DNA ANTIBODY NATIVE: CPT

## 2024-05-13 ASSESSMENT — PAIN SCALES - GENERAL: PAINLEVEL: 0-NO PAIN

## 2024-05-13 NOTE — PROGRESS NOTES
I saw and evaluated the patient. I personally obtained the key and critical portions of the history and physical exam or was physically present for key and critical portions performed by the trainee. I reviewed the trainee's documentation and discussed the patient with the trainee. I agree with the trainee's medical decision making, as documented on the trainee's note.     Cuauhtemoc Jones MD

## 2024-05-20 ENCOUNTER — OFFICE VISIT (OUTPATIENT)
Dept: RHEUMATOLOGY | Age: 69
End: 2024-05-20
Payer: MEDICARE

## 2024-05-20 VITALS — WEIGHT: 153 LBS | HEIGHT: 63 IN | BODY MASS INDEX: 27.11 KG/M2

## 2024-05-20 DIAGNOSIS — M35.01 SJOGREN'S SYNDROME WITH KERATOCONJUNCTIVITIS SICCA (HCC): Chronic | ICD-10-CM

## 2024-05-20 DIAGNOSIS — M35.01 SJOGREN'S SYNDROME WITH KERATOCONJUNCTIVITIS SICCA (HCC): Primary | Chronic | ICD-10-CM

## 2024-05-20 DIAGNOSIS — E85.89 OTHER AMYLOIDOSIS (HCC): ICD-10-CM

## 2024-05-20 LAB
ALBUMIN: 4.1 G/DL (ref 3.5–5.2)
ALP BLD-CCNC: 114 U/L (ref 35–104)
ALT SERPL-CCNC: 8 U/L (ref 0–32)
ANION GAP SERPL CALCULATED.3IONS-SCNC: 11 MMOL/L (ref 7–16)
AST SERPL-CCNC: 21 U/L (ref 0–31)
BACTERIA: ABNORMAL
BASOPHILS ABSOLUTE: 0.03 K/UL (ref 0–0.2)
BASOPHILS RELATIVE PERCENT: 1 % (ref 0–2)
BILIRUB SERPL-MCNC: 0.3 MG/DL (ref 0–1.2)
BILIRUBIN, URINE: NEGATIVE
BUN BLDV-MCNC: 13 MG/DL (ref 6–23)
C-REACTIVE PROTEIN: 4 MG/L (ref 0–5)
CALCIUM SERPL-MCNC: 9.1 MG/DL (ref 8.6–10.2)
CHLORIDE BLD-SCNC: 101 MMOL/L (ref 98–107)
CO2: 25 MMOL/L (ref 22–29)
COLOR: YELLOW
CREAT SERPL-MCNC: 0.8 MG/DL (ref 0.5–1)
EOSINOPHILS ABSOLUTE: 0.01 K/UL (ref 0.05–0.5)
EOSINOPHILS RELATIVE PERCENT: 0 % (ref 0–6)
GFR, ESTIMATED: 87 ML/MIN/1.73M2
GLUCOSE BLD-MCNC: 87 MG/DL (ref 74–99)
GLUCOSE URINE: NEGATIVE MG/DL
HCT VFR BLD CALC: 39.6 % (ref 34–48)
HEMOGLOBIN: 12.4 G/DL (ref 11.5–15.5)
IMMATURE GRANULOCYTES %: 0 % (ref 0–5)
IMMATURE GRANULOCYTES ABSOLUTE: <0.03 K/UL (ref 0–0.58)
KETONES, URINE: NEGATIVE MG/DL
LEUKOCYTE ESTERASE, URINE: ABNORMAL
LYMPHOCYTES ABSOLUTE: 1.01 K/UL (ref 1.5–4)
LYMPHOCYTES RELATIVE PERCENT: 20 % (ref 20–42)
MCH RBC QN AUTO: 28.2 PG (ref 26–35)
MCHC RBC AUTO-ENTMCNC: 31.3 G/DL (ref 32–34.5)
MCV RBC AUTO: 90.2 FL (ref 80–99.9)
MONOCYTES ABSOLUTE: 0.43 K/UL (ref 0.1–0.95)
MONOCYTES RELATIVE PERCENT: 8 % (ref 2–12)
NEUTROPHILS ABSOLUTE: 3.7 K/UL (ref 1.8–7.3)
NEUTROPHILS RELATIVE PERCENT: 71 % (ref 43–80)
NITRITE, URINE: POSITIVE
PDW BLD-RTO: 14.8 % (ref 11.5–15)
PH, URINE: 6 (ref 5–9)
PLATELET # BLD: 225 K/UL (ref 130–450)
PMV BLD AUTO: 12.7 FL (ref 7–12)
POTASSIUM SERPL-SCNC: 4.6 MMOL/L (ref 3.5–5)
PROTEIN UA: NEGATIVE MG/DL
RBC # BLD: 4.39 M/UL (ref 3.5–5.5)
RBC UA: ABNORMAL /HPF
SODIUM BLD-SCNC: 137 MMOL/L (ref 132–146)
SPECIFIC GRAVITY UA: 1.02 (ref 1–1.03)
TOTAL PROTEIN: 9.4 G/DL (ref 6.4–8.3)
TURBIDITY: ABNORMAL
URINE HGB: NEGATIVE
UROBILINOGEN, URINE: 0.2 EU/DL (ref 0–1)
WBC # BLD: 5.2 K/UL (ref 4.5–11.5)
WBC UA: ABNORMAL /HPF

## 2024-05-20 PROCEDURE — 3017F COLORECTAL CA SCREEN DOC REV: CPT | Performed by: INTERNAL MEDICINE

## 2024-05-20 PROCEDURE — G8399 PT W/DXA RESULTS DOCUMENT: HCPCS | Performed by: INTERNAL MEDICINE

## 2024-05-20 PROCEDURE — G2211 COMPLEX E/M VISIT ADD ON: HCPCS | Performed by: INTERNAL MEDICINE

## 2024-05-20 PROCEDURE — G8427 DOCREV CUR MEDS BY ELIG CLIN: HCPCS | Performed by: INTERNAL MEDICINE

## 2024-05-20 PROCEDURE — 99214 OFFICE O/P EST MOD 30 MIN: CPT | Performed by: INTERNAL MEDICINE

## 2024-05-20 PROCEDURE — 1036F TOBACCO NON-USER: CPT | Performed by: INTERNAL MEDICINE

## 2024-05-20 PROCEDURE — 1090F PRES/ABSN URINE INCON ASSESS: CPT | Performed by: INTERNAL MEDICINE

## 2024-05-20 PROCEDURE — G8417 CALC BMI ABV UP PARAM F/U: HCPCS | Performed by: INTERNAL MEDICINE

## 2024-05-20 PROCEDURE — 1123F ACP DISCUSS/DSCN MKR DOCD: CPT | Performed by: INTERNAL MEDICINE

## 2024-05-20 ASSESSMENT — ENCOUNTER SYMPTOMS
ABDOMINAL PAIN: 0
DIARRHEA: 0
SHORTNESS OF BREATH: 0
TROUBLE SWALLOWING: 0
COLOR CHANGE: 0
NAUSEA: 0
COUGH: 0
VOMITING: 0

## 2024-05-20 NOTE — PROGRESS NOTES
Results   Component Value Date    WBC 3.7 (L) 10/23/2023    HGB 10.8 (L) 10/23/2023    HCT 34.2 10/23/2023    MCV 85.3 10/23/2023     10/23/2023     Lab Results   Component Value Date     10/23/2023    K 4.0 10/23/2023     10/23/2023    CO2 27 10/23/2023    BUN 17 10/23/2023    CREATININE 0.9 10/23/2023    GLUCOSE 90 10/23/2023    CALCIUM 9.1 10/23/2023    BILITOT 0.3 08/30/2023    ALKPHOS 125 (H) 08/30/2023    AST 16 08/30/2023    ALT 7 08/30/2023    LABGLOM >60 10/23/2023    GFRAA >60 09/22/2022     No results found for: \"BE\"  No results found for: \"RHEUMFACTOR\"  Lab Results   Component Value Date    SEDRATE 95 (H) 04/11/2023     Lab Results   Component Value Date    CRP 0.3 04/11/2023            An electronic signature was used to authenticate this note.    This note was generated with a voice recognition dictation system. Please disregard any errors or omission which have escaped my review.    --Juarez Pinon DO

## 2024-05-21 LAB
RHEUMATOID FACTOR: 44 IU/ML (ref 0–13)
SED RATE, AUTOMATED: 80 MM/HR (ref 0–30)

## 2024-05-22 LAB
ALBUMIN (CALCULATED): 3.2 G/DL (ref 3.5–4.7)
ALPHA-1-GLOBULIN: 0.3 G/DL (ref 0.2–0.4)
ALPHA-2-GLOBULIN: 0.9 G/DL (ref 0.5–1)
BETA GLOBULIN: 1.3 G/DL (ref 0.8–1.3)
GAMMA GLOBULIN: 3.3 G/DL (ref 0.7–1.6)
PATHOLOGIST: ABNORMAL
PROTEIN ELECTROPHORESIS, SERUM: ABNORMAL
TOTAL PROTEIN: 9 G/DL (ref 6.4–8.3)

## 2024-05-25 ENCOUNTER — TRANSCRIBE ORDERS (OUTPATIENT)
Dept: ADMINISTRATIVE | Age: 69
End: 2024-05-25

## 2024-05-25 DIAGNOSIS — R91.1 SOLITARY LUNG NODULE: Primary | ICD-10-CM

## 2024-06-21 ENCOUNTER — HOSPITAL ENCOUNTER (OUTPATIENT)
Dept: CT IMAGING | Age: 69
End: 2024-06-21
Payer: MEDICARE

## 2024-06-21 DIAGNOSIS — R91.1 SOLITARY LUNG NODULE: ICD-10-CM

## 2024-06-21 PROCEDURE — 71250 CT THORAX DX C-: CPT

## 2024-07-03 ENCOUNTER — APPOINTMENT (OUTPATIENT)
Dept: RHEUMATOLOGY | Facility: CLINIC | Age: 69
End: 2024-07-03
Payer: MEDICARE

## 2024-07-03 VITALS
WEIGHT: 158 LBS | OXYGEN SATURATION: 97 % | TEMPERATURE: 97.1 F | BODY MASS INDEX: 26.98 KG/M2 | SYSTOLIC BLOOD PRESSURE: 127 MMHG | DIASTOLIC BLOOD PRESSURE: 72 MMHG | HEIGHT: 64 IN | HEART RATE: 88 BPM

## 2024-07-03 DIAGNOSIS — J99 AMYLOIDOSIS OF LUNG (MULTI): ICD-10-CM

## 2024-07-03 DIAGNOSIS — E85.4 AMYLOIDOSIS OF LUNG (MULTI): ICD-10-CM

## 2024-07-03 DIAGNOSIS — E85.9 AMYLOIDOSIS, UNSPECIFIED TYPE (MULTI): Primary | ICD-10-CM

## 2024-07-03 PROCEDURE — 99214 OFFICE O/P EST MOD 30 MIN: CPT

## 2024-07-03 PROCEDURE — 1036F TOBACCO NON-USER: CPT

## 2024-07-03 PROCEDURE — 1159F MED LIST DOCD IN RCRD: CPT

## 2024-07-03 NOTE — PROGRESS NOTES
Recall:  67 yo female, hx of bilateral hip replacement, gastric ulcer, with Sjogren sd diagnosis in 2011 is for new incidental finding of amyloidosis on lung biopsy. Based on the prior CT C/A/P and PET-CT, there is no organomegaly or major uptake besides the lungs. She has a history of M spike in 2011 that resolved on more recent workup. Workup done is not suggestive of systemic Amyloidosis.    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  Only on symptomatic ttt for sicca sxs, no HCQ or other therapies.  No ocular sxs besides dry eyes  Dental carries. Intermittent arthralgias, not inflammatory. Mostly in the hands. No numbness, tinglining.  No skin rash. No weakness. No SOB.  Cough for years,green sputum. Getting slightly  worse. Recurrent sinus infections. Resolves with antibitiocs.   Trouble swallowing. Chronic. To food.   Diarrhea occaisional.  No mouth or nose ulcers.   Miscarriage once, first trimester.  No hx of blood clots  No weight loss.   No glandular swelling.  she has not seen much benefit from cevimeline, Salagen, or xerostomia spray.   Patient was incidentally noted to have a right upper lobe nodule, with lung biopsy revealing amyloidosis, without malignancy. No further identification was done on the biopsy.     Family hx: no amyloidosis or rheumatic disease  Non smoker no alcohol , no drug use.    HPI  67 yo female, hx of with Sjogren sd diagnosis in 2011 is for new incidental finding of amyloidosis on lung biopsy. Patient currently has no concerns. Patient had a recent CT chest 6/2024 which showed enlarging of Plaque-like 2.2 x 0.5 by 1.9 cm solid density in the inferior right upper lobe abutting the right minor fissure previously measured 1.9 x 1.7 x 0.5 cm   on 08/30/2023. Her pulmonary doctor had recommended resection of the lesion.     Patient also reports a sinus infection 3 weeks ago. Complicated 5 day  course of Z-pack and doxycycline?   For your dry eyes, she uses OTC tear drops. She does not take anything for dry mouth     Component      Latest Ref Rng 5/13/2024   GLUCOSE      74 - 99 mg/dL 84    SODIUM      136 - 145 mmol/L 138    POTASSIUM      3.5 - 5.3 mmol/L 4.9    CHLORIDE      98 - 107 mmol/L 103    Bicarbonate      21 - 32 mmol/L 27    Anion Gap      10 - 20 mmol/L 13    Blood Urea Nitrogen      6 - 23 mg/dL 14    Creatinine      0.50 - 1.05 mg/dL 0.76    EGFR      >60 mL/min/1.73m*2 86    Calcium      8.6 - 10.6 mg/dL 9.3    Albumin      3.4 - 5.0 g/dL 3.8    Alkaline Phosphatase      33 - 136 U/L 96    Total Protein      6.4 - 8.2 g/dL 8.6 (H)    AST      9 - 39 U/L 18    Bilirubin Total      0.0 - 1.2 mg/dL 0.3    ALT      7 - 45 U/L 12    Color, Urine      Light-Yellow, Yellow, Dark-Yellow  Yellow    Appearance, Urine      Clear  Clear    Specific Gravity, Urine      1.005 - 1.035  1.020    pH, Urine      5.0, 5.5, 6.0, 6.5, 7.0, 7.5, 8.0  5.0    Protein, Urine      NEGATIVE, 10 (TRACE), 20 (TRACE) mg/dL NEGATIVE    Glucose, Urine      Normal mg/dL Normal    Blood, Urine      NEGATIVE  NEGATIVE    Ketones, Urine      NEGATIVE mg/dL NEGATIVE    Bilirubin, Urine      NEGATIVE  NEGATIVE    Urobilinogen, Urine      Normal mg/dL Normal    Nitrite, Urine      NEGATIVE  2+ !    Leukocyte Esterase, Urine      NEGATIVE  250 Froylan/µL !    LEUKOCYTES (10*3/UL) IN BLOOD BY AUTOMATED COUNT, La[place]      4.4 - 11.3 x10*3/uL 4.9    nRBC      0.0 - 0.0 /100 WBCs 0.0    ERYTHROCYTES (10*6/UL) IN BLOOD BY AUTOMATED COUNT, La[place]      4.00 - 5.20 x10*6/uL 4.49    HEMOGLOBIN      12.0 - 16.0 g/dL 11.9 (L)    HEMATOCRIT      36.0 - 46.0 % 39.8    MCV      80 - 100 fL 89    MCH      26.0 - 34.0 pg 26.5    MCHC      32.0 - 36.0 g/dL 29.9 (L)    RED CELL DISTRIBUTION WIDTH      11.5 - 14.5 % 15.2 (H)    PLATELETS (10*3/UL) IN BLOOD AUTOMATED COUNT, La[place]      150 - 450 x10*3/uL 250    IRON      35 - 150 ug/dL 116     UIBC      110 - 370 ug/dL 336    TIBC      240 - 445 ug/dL 452 (H)    % Saturation      25 - 45 % 26    Retic %      0.5 - 2.0 % 1.4    Retic Absolute      0.017 - 0.110 x10*6/uL 0.063    Reticulocyte Hemoglobin      28 - 38 pg 31    Immature Retic fraction      <=16.0 % 8.7    WBC, Urine      1-5, NONE /HPF 11-20 !    RBC, Urine      NONE, 1-2, 3-5 /HPF 1-2    Bacteria, Urine      NONE SEEN /HPF 1+ !    Mucus, Urine      Reference range not established. /LPF FEW    Total Protein, Urine      5 - 24 mg/dL 12    Creatinine, Urine Random      20.0 - 320.0 mg/dL 119.8    T. Protein/Creatinine Ratio      0.00 - 0.17 mg/mg Creat 0.10    C4 Complement      10 - 50 mg/dL 25    C3 Complement      87 - 200 mg/dL 135    Anti-DNA (DS)      <5.0 IU/mL 1.0    C-Reactive Protein      <1.00 mg/dL 0.49    Sed Rate      0 - 30 mm/h 23    FERRITIN      8 - 150 ng/mL 24    LDH      84 - 246 U/L 144    Fibrinogen      200 - 400 mg/dL 428 (H)          CT Chest wo iv Con 6/21/2024  Plaque-like 2.2 x 0.5 by 1.9 cm solid density in the inferior right upper   lobe abutting the right minor fissure previously measured 1.9 x 1.7 x 0.5 cm   on 08/30/2023 and is best seen on axial images 51-55/135 and sagittal images   30-41/160.     There is no evidence of pleural effusion or pneumothorax.  No airspace or   interstitial lung disease is identified.  There is no evidence of new   pulmonary nodule.  Tracheobronchial tree is fairly well preserved.     There is no evidence of axillary, supraclavicular, mediastinal or hilar lymph   node enlargement.  There is no evidence of pericardial effusion great the   adrenal glands are not enlarged.  Bone windows reveal no evidence of acute   fracture or osteolytic/osteoblastic lesion.  Degenerative changes are present   in the midthoracic spine.     CT angio Chest  FINDINGS:   Pulmonary Arteries: Pulmonary arteries are adequately opacified for   evaluation.  No evidence of intraluminal filling defect to  suggest pulmonary   embolism.  Main pulmonary artery is normal in caliber.     Mediastinum: No evidence of mediastinal lymphadenopathy.  The heart and   pericardium demonstrate no acute abnormality.  There is no acute abnormality   of the thoracic aorta.     Lungs/pleura: There is no pulmonary infiltrate     There is a curvilinear masslike opacity seen within the right upper lobe   abutting the minor fissure.  The curvilinear masslike opacity measures 1.4 cm   x 1.6 cm x 0.6 cm.  The borders are irregular as demonstrated on the axial   and sagittal images.  While this could represent a Cesilia fissural lymph node   the size of the soft tissue opacity and irregular borders are worrisome for   underlying malignancy.  Dedicated PET-CT scan is recommended.     The left lung is clear.     Upper Abdomen: Limited images of the upper abdomen are unremarkable.     Soft Tissues/Bones: No acute bone or soft tissue abnormality.     PET-CT  Impression    1.  Irregular nodular density in the right upper lobe abutting the right  minor fissure demonstrates focal hypermetabolic activity on PET; malignancy  is not excluded.  Tissue diagnosis is recommended.    No definite hypermetabolic mediastinal or hilar lymphadenopathy.    2.  Bilateral hip arthroplasties.  Heterogeneous activity along the left hip  arthroplasty associated with mild subcutaneous stranding and uptake in the  lateral left hip could be postsurgical/inflammatory.  Clinical correlation is  suggested.    3.  Mild asymmetric increased activity is noted in the left tonsillar region  for which correlation with clinical examination is recommended.      1/25/11    Rheumatoid factor - 111  SUDHIR - 516   SSA - positive  SSB positive  Negative:  Anti - RNP, Am, Scl70, Jo1, centromere, histone  C3 133 []  C4 20 [10-40]      Objective     Physical Exam  General: She is not in acute distress.  HENT:  Head: Normocephalic and atraumatic.  Nose: Nose  normal.  Mouth/Throat:  Comments: She has poor salivary pooling  Eyes:  General: No scleral icterus.  Pulmonary:  Effort: Pulmonary effort is normal.  Musculoskeletal:  General: No swelling, tenderness or deformity.  Skin:  General: Skin is warm and dry.  Findings: No rash besides fungal infection over the foot  Neurological:  General: No focal deficit present.  Mental Status: She is alert and oriented to person, place, and time. Mental status is at baseline.  Psychiatric:  Mood and Affect: Mood normal.  Behavior: Behavior normal.      Assessment/Plan   69 yo female, hx of bilateral hip replacement, gastric ulcer, with Sjogren sd diagnosis in 2011 is for new incidental finding of amyloidosis on lung biopsy. Based on the prior CT C/A/P and PET-CT, there is no organomegaly or major uptake besides the lungs. She has a history of M spike in 2011 that resolved on more recent workup. 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 At this time given her long standing Sjogren it is imperative to rule out plasma cell dyscrasia prior to proceeding with lung resection (rec by pulmonary physician) given increased growth in size of the nodule. Referral to both hematology and cardiothoracic surgery provided.       Rishi Wilson DO   Rheumatology Fellow,PGY-IV    Teaching Statement    Patient interviewed and examined with Fellow.  I personally verified the key and critical portions of the history and physical examination and reviewed the documentation. History and physical as recorded above. I agree with the assessment and plan of the fellow.  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   She has polyclonal gammopathy.  Continues to have dry eyes and dry mouth.   Will refer to hematology to rule plasma cell dyscrasia and Thoracic surgery for possible resection.    Reviewed and approved by MIKE JUAREZ on 7/3/24  at 5:00 PM.

## 2024-07-03 NOTE — PROGRESS NOTES
I saw and evaluated the patient. I personally obtained the key and critical portions of the history and physical exam or was physically present for key and critical portions performed by the resident/fellow. I reviewed the resident/fellow's documentation and discussed the patient with the resident/fellow. I agree with the resident/fellow's medical decision making as documented in the note.    Rylee Monte MD

## 2024-07-12 ENCOUNTER — OFFICE VISIT (OUTPATIENT)
Dept: SURGERY | Facility: HOSPITAL | Age: 69
End: 2024-07-12
Payer: MEDICARE

## 2024-07-12 VITALS
SYSTOLIC BLOOD PRESSURE: 117 MMHG | WEIGHT: 157.6 LBS | DIASTOLIC BLOOD PRESSURE: 75 MMHG | RESPIRATION RATE: 14 BRPM | BODY MASS INDEX: 27.05 KG/M2 | OXYGEN SATURATION: 100 % | HEART RATE: 67 BPM | TEMPERATURE: 96.6 F

## 2024-07-12 DIAGNOSIS — E85.4 AMYLOIDOSIS OF LUNG (MULTI): Primary | ICD-10-CM

## 2024-07-12 DIAGNOSIS — E85.4 AMYLOIDOSIS OF LUNG (MULTI): ICD-10-CM

## 2024-07-12 DIAGNOSIS — J99 AMYLOIDOSIS OF LUNG (MULTI): ICD-10-CM

## 2024-07-12 DIAGNOSIS — J99 AMYLOIDOSIS OF LUNG (MULTI): Primary | ICD-10-CM

## 2024-07-12 PROCEDURE — 99215 OFFICE O/P EST HI 40 MIN: CPT | Performed by: THORACIC SURGERY (CARDIOTHORACIC VASCULAR SURGERY)

## 2024-07-12 PROCEDURE — 1126F AMNT PAIN NOTED NONE PRSNT: CPT | Performed by: THORACIC SURGERY (CARDIOTHORACIC VASCULAR SURGERY)

## 2024-07-12 PROCEDURE — 99205 OFFICE O/P NEW HI 60 MIN: CPT | Performed by: THORACIC SURGERY (CARDIOTHORACIC VASCULAR SURGERY)

## 2024-07-12 ASSESSMENT — ENCOUNTER SYMPTOMS
EYES NEGATIVE: 1
HEMATOLOGIC/LYMPHATIC NEGATIVE: 1
STRIDOR: 0
DIARRHEA: 0
ABDOMINAL DISTENTION: 0
CHOKING: 0
ABDOMINAL PAIN: 0
UNEXPECTED WEIGHT CHANGE: 0
ALLERGIC/IMMUNOLOGIC NEGATIVE: 1
PALPITATIONS: 0
WHEEZING: 0
COUGH: 0
VOMITING: 0
PSYCHIATRIC NEGATIVE: 1
ENDOCRINE NEGATIVE: 1
CONSTIPATION: 0
APPETITE CHANGE: 0
NEUROLOGICAL NEGATIVE: 1
NAUSEA: 0
FATIGUE: 0
DIAPHORESIS: 0
SHORTNESS OF BREATH: 0
MUSCULOSKELETAL NEGATIVE: 1
CHEST TIGHTNESS: 0
CHILLS: 0
FEVER: 0

## 2024-07-12 ASSESSMENT — PAIN SCALES - GENERAL: PAINLEVEL: 0-NO PAIN

## 2024-07-12 NOTE — H&P (VIEW-ONLY)
Subjective   Patient ID: Bethany Cuenca is a 69 y.o. female who presents for Follow-up.  HPI    69-year-old female with history of Sjogren's syndrome who was found to have a right upper lobe lung nodule.  She underwent a PET scan showing that this nodule had an SUV max of 3.7.  This nodule measures about 1.6 x 1.8 cm.  A biopsy was performed with a questionable diagnosis of amyloidosis.  Serum solid nodule has been growing.  She has no history of smoking.  She is here to discuss surgical resection.  I have reviewed the images and I think this nodule is in a favorable location for a wedge resection.  We discussed the mechanics of a robotic assisted lung resection including the length of stay and the potential complications.  She is going to visit with hematology and she is scheduled to have PFTs done.  [place] [facility] [place]    Review of Systems   Constitutional:  Negative for appetite change, chills, diaphoresis, fatigue, fever and unexpected weight change.   HENT: Negative.     Eyes: Negative.    Respiratory:  Negative for cough, choking, chest tightness, shortness of breath, wheezing and stridor.    Cardiovascular:  Negative for chest pain, palpitations and leg swelling.   Gastrointestinal:  Negative for abdominal distention, abdominal pain, constipation, diarrhea, nausea and vomiting.   Endocrine: Negative.    Genitourinary: Negative.    Musculoskeletal: Negative.    Skin: Negative.    Allergic/Immunologic: Negative.    Neurological: Negative.    Hematological: Negative.    Psychiatric/Behavioral: Negative.     All other systems reviewed and are negative.      Objective   Physical Exam  Constitutional:       Appearance: Normal appearance.   HENT:      Head: Normocephalic and atraumatic.      Nose: Nose normal.      Mouth/Throat:      Mouth: Mucous membranes are moist.      Pharynx: Oropharynx is clear.   Eyes:      Extraocular Movements: Extraocular movements intact.       Conjunctiva/sclera: Conjunctivae normal.      Pupils: Pupils are equal, round, and reactive to light.   Cardiovascular:      Rate and Rhythm: Normal rate and regular rhythm.      Pulses: Normal pulses.      Heart sounds: Normal heart sounds.   Pulmonary:      Effort: Pulmonary effort is normal. No respiratory distress.      Breath sounds: Normal breath sounds. No stridor. No wheezing, rhonchi or rales.   Chest:      Chest wall: No tenderness.   Abdominal:      General: Abdomen is flat. Bowel sounds are normal.      Palpations: Abdomen is soft.   Musculoskeletal:         General: Normal range of motion.      Cervical back: Normal range of motion and neck supple.   Skin:     General: Skin is warm and dry.      Capillary Refill: Capillary refill takes less than 2 seconds.   Neurological:      General: No focal deficit present.      Mental Status: She is alert and oriented to person, place, and time.         Assessment/Plan   Diagnoses and all orders for this visit:  Amyloidosis of lung (Multi)    -     Case Request Operating Room: Resection Robot-Assisted Lung Wedge  -     Request for Pre-Admission Testing Visit; Blaine Tubbs MD 07/12/24 4:34 PM

## 2024-07-12 NOTE — PROGRESS NOTES
Subjective   Patient ID: Bethany Cuenca is a 69 y.o. female who presents for Follow-up.  HPI    69-year-old female with history of Sjogren's syndrome who was found to have a right upper lobe lung nodule.  She underwent a PET scan showing that this nodule had an SUV max of 3.7.  This nodule measures about 1.6 x 1.8 cm.  A biopsy was performed with a questionable diagnosis of amyloidosis.  Serum solid nodule has been growing.  She has no history of smoking.  She is here to discuss surgical resection.  I have reviewed the images and I think this nodule is in a favorable location for a wedge resection.  We discussed the mechanics of a robotic assisted lung resection including the length of stay and the potential complications.  She is going to visit with hematology and she is scheduled to have PFTs done.  Will plan to do this resection at [facility] [place] on [place] 8.    Review of Systems   Constitutional:  Negative for appetite change, chills, diaphoresis, fatigue, fever and unexpected weight change.   HENT: Negative.     Eyes: Negative.    Respiratory:  Negative for cough, choking, chest tightness, shortness of breath, wheezing and stridor.    Cardiovascular:  Negative for chest pain, palpitations and leg swelling.   Gastrointestinal:  Negative for abdominal distention, abdominal pain, constipation, diarrhea, nausea and vomiting.   Endocrine: Negative.    Genitourinary: Negative.    Musculoskeletal: Negative.    Skin: Negative.    Allergic/Immunologic: Negative.    Neurological: Negative.    Hematological: Negative.    Psychiatric/Behavioral: Negative.     All other systems reviewed and are negative.      Objective   Physical Exam  Constitutional:       Appearance: Normal appearance.   HENT:      Head: Normocephalic and atraumatic.      Nose: Nose normal.      Mouth/Throat:      Mouth: Mucous membranes are moist.      Pharynx: Oropharynx is clear.   Eyes:      Extraocular Movements: Extraocular movements intact.       Conjunctiva/sclera: Conjunctivae normal.      Pupils: Pupils are equal, round, and reactive to light.   Cardiovascular:      Rate and Rhythm: Normal rate and regular rhythm.      Pulses: Normal pulses.      Heart sounds: Normal heart sounds.   Pulmonary:      Effort: Pulmonary effort is normal. No respiratory distress.      Breath sounds: Normal breath sounds. No stridor. No wheezing, rhonchi or rales.   Chest:      Chest wall: No tenderness.   Abdominal:      General: Abdomen is flat. Bowel sounds are normal.      Palpations: Abdomen is soft.   Musculoskeletal:         General: Normal range of motion.      Cervical back: Normal range of motion and neck supple.   Skin:     General: Skin is warm and dry.      Capillary Refill: Capillary refill takes less than 2 seconds.   Neurological:      General: No focal deficit present.      Mental Status: She is alert and oriented to person, place, and time.         Assessment/Plan   Diagnoses and all orders for this visit:  Amyloidosis of lung (Multi)    -     Case Request Operating Room: Resection Robot-Assisted Lung Wedge  -     Request for Pre-Admission Testing Visit; Blaine Tubbs MD 07/12/24 4:34 PM

## 2024-07-23 ENCOUNTER — LAB (OUTPATIENT)
Dept: LAB | Facility: HOSPITAL | Age: 69
End: 2024-07-23
Payer: MEDICARE

## 2024-07-23 ENCOUNTER — OFFICE VISIT (OUTPATIENT)
Dept: HEMATOLOGY/ONCOLOGY | Facility: HOSPITAL | Age: 69
End: 2024-07-23
Payer: MEDICARE

## 2024-07-23 VITALS
TEMPERATURE: 97.3 F | OXYGEN SATURATION: 100 % | BODY MASS INDEX: 27.21 KG/M2 | HEART RATE: 95 BPM | WEIGHT: 158.51 LBS | RESPIRATION RATE: 16 BRPM | SYSTOLIC BLOOD PRESSURE: 131 MMHG | DIASTOLIC BLOOD PRESSURE: 68 MMHG

## 2024-07-23 DIAGNOSIS — E85.9 AMYLOIDOSIS, UNSPECIFIED TYPE (MULTI): ICD-10-CM

## 2024-07-23 DIAGNOSIS — E85.9 AMYLOIDOSIS, UNSPECIFIED TYPE (MULTI): Primary | ICD-10-CM

## 2024-07-23 LAB
ALBUMIN SERPL BCP-MCNC: 4 G/DL (ref 3.4–5)
ALP SERPL-CCNC: 83 U/L (ref 33–136)
ALT SERPL W P-5'-P-CCNC: 9 U/L (ref 7–45)
ANION GAP SERPL CALC-SCNC: 10 MMOL/L (ref 10–20)
AST SERPL W P-5'-P-CCNC: 17 U/L (ref 9–39)
BASOPHILS # BLD AUTO: 0.03 X10*3/UL (ref 0–0.1)
BASOPHILS NFR BLD AUTO: 0.6 %
BILIRUB SERPL-MCNC: 0.3 MG/DL (ref 0–1.2)
BUN SERPL-MCNC: 16 MG/DL (ref 6–23)
CALCIUM SERPL-MCNC: 9.3 MG/DL (ref 8.6–10.3)
CHLORIDE SERPL-SCNC: 102 MMOL/L (ref 98–107)
CO2 SERPL-SCNC: 28 MMOL/L (ref 21–32)
CREAT SERPL-MCNC: 0.97 MG/DL (ref 0.5–1.05)
EGFRCR SERPLBLD CKD-EPI 2021: 64 ML/MIN/1.73M*2
EOSINOPHIL # BLD AUTO: 0.02 X10*3/UL (ref 0–0.7)
EOSINOPHIL NFR BLD AUTO: 0.4 %
ERYTHROCYTE [DISTWIDTH] IN BLOOD BY AUTOMATED COUNT: 14.8 % (ref 11.5–14.5)
GLUCOSE SERPL-MCNC: 77 MG/DL (ref 74–99)
HCT VFR BLD AUTO: 40.3 % (ref 36–46)
HGB BLD-MCNC: 13.1 G/DL (ref 12–16)
IGA SERPL-MCNC: 233 MG/DL (ref 70–400)
IGG SERPL-MCNC: 3100 MG/DL (ref 700–1600)
IGM SERPL-MCNC: 184 MG/DL (ref 40–230)
IMM GRANULOCYTES # BLD AUTO: 0.02 X10*3/UL (ref 0–0.7)
IMM GRANULOCYTES NFR BLD AUTO: 0.4 % (ref 0–0.9)
LDH SERPL L TO P-CCNC: 120 U/L (ref 84–246)
LYMPHOCYTES # BLD AUTO: 0.98 X10*3/UL (ref 1.2–4.8)
LYMPHOCYTES NFR BLD AUTO: 20.9 %
MCH RBC QN AUTO: 28.4 PG (ref 26–34)
MCHC RBC AUTO-ENTMCNC: 32.5 G/DL (ref 32–36)
MCV RBC AUTO: 87 FL (ref 80–100)
MONOCYTES # BLD AUTO: 0.43 X10*3/UL (ref 0.1–1)
MONOCYTES NFR BLD AUTO: 9.2 %
NEUTROPHILS # BLD AUTO: 3.21 X10*3/UL (ref 1.2–7.7)
NEUTROPHILS NFR BLD AUTO: 68.5 %
NRBC BLD-RTO: 0 /100 WBCS (ref 0–0)
PLATELET # BLD AUTO: 248 X10*3/UL (ref 150–450)
POTASSIUM SERPL-SCNC: 3.9 MMOL/L (ref 3.5–5.3)
PROT SERPL-MCNC: 8.9 G/DL (ref 6.4–8.2)
PROT SERPL-MCNC: 9.2 G/DL (ref 6.4–8.2)
PROT UR-ACNC: 9 MG/DL (ref 5–25)
RBC # BLD AUTO: 4.62 X10*6/UL (ref 4–5.2)
SODIUM SERPL-SCNC: 136 MMOL/L (ref 136–145)
WBC # BLD AUTO: 4.7 X10*3/UL (ref 4.4–11.3)

## 2024-07-23 PROCEDURE — 80053 COMPREHEN METABOLIC PANEL: CPT

## 2024-07-23 PROCEDURE — 99205 OFFICE O/P NEW HI 60 MIN: CPT | Performed by: INTERNAL MEDICINE

## 2024-07-23 PROCEDURE — 1126F AMNT PAIN NOTED NONE PRSNT: CPT | Performed by: INTERNAL MEDICINE

## 2024-07-23 PROCEDURE — 36415 COLL VENOUS BLD VENIPUNCTURE: CPT

## 2024-07-23 PROCEDURE — 83521 IG LIGHT CHAINS FREE EACH: CPT | Performed by: INTERNAL MEDICINE

## 2024-07-23 PROCEDURE — 99215 OFFICE O/P EST HI 40 MIN: CPT | Performed by: INTERNAL MEDICINE

## 2024-07-23 PROCEDURE — 83615 LACTATE (LD) (LDH) ENZYME: CPT

## 2024-07-23 PROCEDURE — 84155 ASSAY OF PROTEIN SERUM: CPT | Mod: 59 | Performed by: INTERNAL MEDICINE

## 2024-07-23 PROCEDURE — 86334 IMMUNOFIX E-PHORESIS SERUM: CPT | Performed by: INTERNAL MEDICINE

## 2024-07-23 PROCEDURE — 1159F MED LIST DOCD IN RCRD: CPT | Performed by: INTERNAL MEDICINE

## 2024-07-23 PROCEDURE — 84156 ASSAY OF PROTEIN URINE: CPT | Performed by: INTERNAL MEDICINE

## 2024-07-23 PROCEDURE — 86335 IMMUNFIX E-PHORSIS/URINE/CSF: CPT | Performed by: INTERNAL MEDICINE

## 2024-07-23 PROCEDURE — 82784 ASSAY IGA/IGD/IGG/IGM EACH: CPT | Performed by: INTERNAL MEDICINE

## 2024-07-23 PROCEDURE — 85025 COMPLETE CBC W/AUTO DIFF WBC: CPT

## 2024-07-23 ASSESSMENT — PAIN SCALES - GENERAL: PAINLEVEL: 0-NO PAIN

## 2024-07-23 NOTE — PROGRESS NOTES
Patient ID: Bethany Cuenca is a 69 y.o. female.  Referring Physician: Rylee Monte MD  [id number] [facility]  Department [place] [address]  Primary Care Provider: No Assigned PCP Generic Provider, MD Rodriguez    Patient with newly diagnosed amyloidosis from lung nodule is here for further discussion and management.  She developed pulmonay nodule bx done 10/23/23 consistent with amyloidosis.     Patient developed lung nodule RUL(1.9x1.7cm x 0.5 cm, SUV 3.7) in 10/2023 and biopsy was consistent with amyloidosis.([place] red positive)  Follow up CT 6/21/24 showed persistent nodule(2.2x1.9x0.5 cm). Wedge resection is planned on 8/8/24.   BW 11/27/23 showed IgG 3540, Kappa LC 7.06 mg/dL, Lambda 4.63, K/L ratio normal(1.52) SPEP/UPEP showed no paraproteins. Hgb, Cr, alb, Ca from 7/23/24 were all WNL.    7/23/24: first visit with me. She remains asymptomatic. No recent weight loss, SOB, night sweats or f/c/n/v/d.       Review of Systems   All other systems reviewed and are negative.    PMHx; Sjogren's syndrome(dx 2011), PUD,  bilateral hip replacement      Objective   BSA: 1.8 meters squared  /68   Pulse 95   Temp 36.3 °C (97.3 °F)   Resp 16   Wt 71.9 kg (158 lb 8.2 oz)   SpO2 100%   BMI 27.21 kg/m²     No family history on file.  Oncology History    No history exists.       Bethany Cuenca  reports that she has never smoked. She has been exposed to tobacco smoke. She has never used smokeless tobacco.  She  reports that she does not currently use alcohol.  She  reports no history of drug use.    Physical Exam  Constitutional:       Appearance: Normal appearance.   HENT:      Head: Normocephalic and atraumatic.      Mouth/Throat:      Mouth: Mucous membranes are moist.      Pharynx: Oropharynx is clear.   Eyes:      Extraocular Movements: Extraocular movements intact.      Pupils: Pupils are equal, round, and reactive to light.   Cardiovascular:      Rate and Rhythm: Normal rate  and regular rhythm.      Heart sounds: No murmur heard.  Pulmonary:      Effort: Pulmonary effort is normal.      Breath sounds: Normal breath sounds.   Abdominal:      General: Abdomen is flat. Bowel sounds are normal.      Palpations: Abdomen is soft.   Musculoskeletal:         General: Normal range of motion.      Cervical back: Normal range of motion and neck supple.      Right lower leg: No edema.      Left lower leg: No edema.   Skin:     General: Skin is warm and dry.   Neurological:      General: No focal deficit present.      Mental Status: She is alert.   Psychiatric:         Mood and Affect: Mood normal.         Behavior: Behavior normal.         Judgment: Judgment normal.         Performance Status:  Asymptomatic      Assessment/Plan    Pulmonary nodule, RUL-> Bx congo red positive.(Amyloidosis)  Wedge resection scheduled on 8/8/24.  - agree with plan. If still positive for congo red, need to send out for mass spect([place]) for typing  - patient denies any other symptoms.  - Hgb, Cr, alb, Ca, LDH from today 7/23/24 were all WNL  - will repeat SPEP/UPEP with immunofixation.     RTC after surgery         Luis E Shanks MD

## 2024-07-24 LAB
KAPPA LC SERPL-MCNC: 5.41 MG/DL (ref 0.33–1.94)
KAPPA LC/LAMBDA SER: 1.29 {RATIO} (ref 0.26–1.65)
LAMBDA LC SERPL-MCNC: 4.19 MG/DL (ref 0.57–2.63)

## 2024-07-25 ENCOUNTER — PRE-ADMISSION TESTING (OUTPATIENT)
Dept: PREADMISSION TESTING | Facility: HOSPITAL | Age: 69
End: 2024-07-25
Payer: MEDICARE

## 2024-07-25 ENCOUNTER — TELEPHONE (OUTPATIENT)
Dept: RHEUMATOLOGY | Age: 69
End: 2024-07-25

## 2024-07-25 VITALS
RESPIRATION RATE: 16 BRPM | WEIGHT: 157 LBS | BODY MASS INDEX: 26.8 KG/M2 | HEIGHT: 64 IN | TEMPERATURE: 97.6 F | DIASTOLIC BLOOD PRESSURE: 77 MMHG | SYSTOLIC BLOOD PRESSURE: 121 MMHG | HEART RATE: 65 BPM

## 2024-07-25 DIAGNOSIS — J99 AMYLOIDOSIS OF LUNG (MULTI): Primary | ICD-10-CM

## 2024-07-25 DIAGNOSIS — E85.4 AMYLOIDOSIS OF LUNG (MULTI): Primary | ICD-10-CM

## 2024-07-25 LAB
ABO GROUP (TYPE) IN BLOOD: NORMAL
ALBUMIN MFR UR ELPH: 23.7 %
ALBUMIN: 4.1 G/DL (ref 3.4–5)
ALPHA 1 GLOBULIN: 0.3 G/DL (ref 0.2–0.6)
ALPHA 2 GLOBULIN: 0.8 G/DL (ref 0.4–1.1)
ALPHA1 GLOB MFR UR ELPH: 5.9 %
ALPHA2 GLOB MFR UR ELPH: 16.2 %
ANTIBODY SCREEN: NORMAL
B-GLOBULIN MFR UR ELPH: 29.1 %
BETA GLOBULIN: 1 G/DL (ref 0.5–1.2)
GAMMA GLOB MFR UR ELPH: 25.1 %
GAMMA GLOBULIN: 2.8 G/DL (ref 0.5–1.4)
IMMUNOFIXATION COMMENT: ABNORMAL
IMMUNOFIXATION COMMENT: NORMAL
PATH REVIEW - SERUM IMMUNOFIXATION: ABNORMAL
PATH REVIEW - URINE IMMUNOFIXATION: NORMAL
PATH REVIEW-SERUM PROTEIN ELECTROPHORESIS: ABNORMAL
PATH REVIEW-URINE PROTEIN ELECTROPHORESIS: NORMAL
PROTEIN ELECTROPHORESIS COMMENT: ABNORMAL
RH FACTOR (ANTIGEN D): NORMAL
URINE ELECTROPHORESIS COMMENT: NORMAL

## 2024-07-25 PROCEDURE — 87081 CULTURE SCREEN ONLY: CPT | Performed by: NURSE PRACTITIONER

## 2024-07-25 PROCEDURE — 99204 OFFICE O/P NEW MOD 45 MIN: CPT | Performed by: NURSE PRACTITIONER

## 2024-07-25 PROCEDURE — 36415 COLL VENOUS BLD VENIPUNCTURE: CPT

## 2024-07-25 PROCEDURE — 86901 BLOOD TYPING SEROLOGIC RH(D): CPT | Performed by: NURSE PRACTITIONER

## 2024-07-25 RX ORDER — CHLORHEXIDINE GLUCONATE ORAL RINSE 1.2 MG/ML
15 SOLUTION DENTAL AS NEEDED
Qty: 473 ML | Refills: 0 | Status: SHIPPED | OUTPATIENT
Start: 2024-07-25 | End: 2024-07-27

## 2024-07-25 RX ORDER — CHLORHEXIDINE GLUCONATE 40 MG/ML
SOLUTION TOPICAL DAILY PRN
Qty: 473 ML | Refills: 0 | Status: SHIPPED | OUTPATIENT
Start: 2024-07-25 | End: 2024-07-30

## 2024-07-25 ASSESSMENT — ENCOUNTER SYMPTOMS
CARDIOVASCULAR NEGATIVE: 1
CONSTITUTIONAL NEGATIVE: 1
GASTROINTESTINAL NEGATIVE: 1
ARTHRALGIAS: 1
NEUROLOGICAL NEGATIVE: 1
ENDOCRINE NEGATIVE: 1
COUGH: 1
NECK NEGATIVE: 1

## 2024-07-25 ASSESSMENT — DUKE ACTIVITY SCORE INDEX (DASI)
CAN YOU WALK A BLOCK OR TWO ON LEVEL GROUND: YES
CAN YOU RUN A SHORT DISTANCE: YES
CAN YOU PARTICIPATE IN STRENOUS SPORTS LIKE SWIMMING, SINGLES TENNIS, FOOTBALL, BASKETBALL, OR SKIING: YES
CAN YOU HAVE SEXUAL RELATIONS: YES
CAN YOU DO HEAVY WORK AROUND THE HOUSE LIKE SCRUBBING FLOORS OR LIFTING AND MOVING HEAVY FURNITURE: YES
TOTAL_SCORE: 58.2
CAN YOU DO LIGHT WORK AROUND THE HOUSE LIKE DUSTING OR WASHING DISHES: YES
CAN YOU DO YARD WORK LIKE RAKING LEAVES, WEEDING OR PUSHING A MOWER: YES
CAN YOU DO MODERATE WORK AROUND THE HOUSE LIKE VACUUMING, SWEEPING FLOORS OR CARRYING GROCERIES: YES
DASI METS SCORE: 9.9
CAN YOU TAKE CARE OF YOURSELF (EAT, DRESS, BATHE, OR USE TOILET): YES
CAN YOU CLIMB A FLIGHT OF STAIRS OR WALK UP A HILL: YES
CAN YOU PARTICIPATE IN MODERATE RECREATIONAL ACTIVITIES LIKE GOLF, BOWLING, DANCING, DOUBLES TENNIS OR THROWING A BASEBALL OR FOOTBALL: YES
CAN YOU WALK INDOORS, SUCH AS AROUND YOUR HOUSE: YES

## 2024-07-25 ASSESSMENT — LIFESTYLE VARIABLES: SMOKING_STATUS: NONSMOKER

## 2024-07-25 NOTE — TELEPHONE ENCOUNTER
Patient notified of voiced gratitude for the response.    Electronically signed by Denia Rosenthal CMA on 7/25/2024 at 12:57 PM

## 2024-07-25 NOTE — PREPROCEDURE INSTRUCTIONS
Fasting Guidelines    NPO Instructions:    Do not eat any food after midnight the night before your surgery/procedure.  You may have up to TEN ounces of clear liquids until TWO hours before your instructed arrival time to the hospital. This includes water, black tea/coffee, (no milk or cream), apple juice, and/or electrolyte drinks (Gatorade).  You may chew gum up to TWO hours before your surgery/procedure.    Additional Instructions:     We have sent a prescription for Hibiclens soap and Peridex mouth wash to your preferred pharmacy.  If you have not already, Please  your prescription and start using as directed before surgery.  Follow the instruction sheet provided to you at your CPM/PAT appointment.    Avoid herbal supplements, multivitamins and NSAIDS (non-steroidal anti-inflammatory drugs) such as Advil, Aleve, Ibuprofen, Naproxen, Excedrin, Meloxicam or Celebrex for at least 7 days prior to surgery. May take Tylenol as needed.    Avoid tobacco and alcohol products for 24 hours prior to surgery.    CONTACT SURGEON'S OFFICE IF YOU DEVELOP:  * Fever = 100.4 F   * New respiratory symptoms (e.g. cough, shortness of breath, respiratory distress, sore throat)  * Recent loss of taste or smell  *Flu like symptoms such as headache, fatigue or gastrointestinal symptoms  * You develop any open sores, shingles, burning or painful urination   AND/OR:  * You no longer wish to have the surgery.  * Any other personal circumstances change that may lead to the need to cancel or defer this surgery.  *You were admitted to any hospital within one week of your planned procedure.    Seven/Six Days before Surgery:  Review your medication instructions, stop indicated medications    Day of Surgery:  Review your medication instructions, take indicated medications  Wear comfortable loose fitting clothing  Do not use moisturizers, creams, lotions or perfume  All jewelry and valuables should be left at home      [facility]  [phone]           Patient Information: Pre-Operative Infection Prevention Measures     Why did I have my nose, under my arms, and groin swabbed?  The purpose of the swab is to identify Staphylococcus aureus inside your nose or on your skin.  The swab was sent to the laboratory for culture.  A positive swab/culture for Staphylococcus aureus is called colonization or carriage.      What is Staphylococcus aureus?  Staphylococcus aureus, also known as “staph”, is a germ found on the skin or in the nose of healthy people.  Sometimes Staphylococcus aureus can get into the body and cause an infection.  This can be minor (such as pimples, boils, or other skin problems).  It might also be serious (such as a blood infection, pneumonia, or a surgical site infection).    What is Staphylococcus aureus colonization or carriage?  Colonization or carriage means that a person has the germ but is not sick from it.  These bacteria can be spread on the hands or when breathing or sneezing.    How is Staphylococcus aureus spread?  It is most often spread by close contact with a person or item that carries it.    What happens if my culture is positive for Staphylococcus aureus?  Your doctor/medical team will use this information to guide any antibiotic treatment which may be necessary.  Regardless of the culture results, we will clean the inside of your nose with a betadine swab just before you have your surgery.      Will I get an infection if I have Staphylococcus aureus in my nose or on my skin?  Anyone can get an infection with Staphylococcus aureus.  However, the best way to reduce your risk of infection is to follow the instructions provided to you for the use of your CHG soap and dental rinse.        Patient Information: Oral/Dental Rinse    What is oral/dental rinse?   It is a mouthwash. It is a way of cleaning the mouth with a germ-killing solution before your surgery.  The solution contains  chlorhexidine, commonly known as CHG.   It is used inside the mouth to kill a bacteria known as Staphylococcus aureus.  Let your doctor know if you are allergic to Chlorhexidine.    Why do I need to use CHG oral/dental rinse?  The CHG oral/dental rinse helps to kill a bacteria in your mouth known as Staphylococcus aureus.     This reduces the risk of infection at the surgical site.      Using your CHG oral/dental rinse  STEPS:  Use your CHG oral/dental rinse after you brush your teeth the night before (at bedtime) and the morning of your surgery.  Follow all directions on your prescription label.    Use the cap on the container to measure 15ml   Swish (gargle if you can) the mouthwash in your mouth for at least 30 seconds, (do not swallow) and spit out  After you use your CHG rinse, do not rinse your mouth with water, drink or eat.  Please refer to the prescription label for the appropriate time to resume oral intake      What side effects might I have using the CHG oral/dental rinse?  CHG rinse will stick to plaque on the teeth.  Brush and floss just before use.  Teeth brushing will help avoid staining of plaque during use.      Patient Information: Home Preoperative Antibacterial Shower      What is a home preoperative antibacterial shower?  This shower is a way of cleaning the skin with a germ-killing solution before surgery.  The solution contains chlorhexidine, commonly known as CHG.  CHG is a skin cleanser with germ-killing ability.  Let your doctor know if you are allergic to chlorhexidine.    Why do I need to take a preoperative antibacterial shower?  Skin is not sterile.  It is best to try to make your skin as free of germs as possible before surgery.  Proper cleansing with a germ-killing soap before surgery can lower the number of germs on your skin.  This helps to reduce the risk of infection at the surgical site.  Following the instructions listed below will help you prepare your skin for surgery.       How do I use the solution?  Steps:  Begin using your CHG soap 5 days before your scheduled surgery on ________________________.    First, wash and rinse your hair using the CHG soap. Keep CHG soap away from ear canals and eyes.  Rinse completely, do not condition.  Hair extensions should be removed.  Wash your face with your normal soap and rinse.    Apply the CHG solution to a clean wet washcloth.  Turn the water off or move away from the water spray to avoid premature rinsing of the CHG soap as you are applying.   Firmly lather your entire body from the neck down.  Do not use on your face.  Pay special attention to the area(s) where your incision(s) will be located unless they are on your face.  Avoid scrubbing your skin too hard.  The important point is to have the CHG soap sit on your skin for 3 minutes.    When the 3 minutes are up, turn on the water and rinse the CHG solution off your body completely.   DO NOT wash with regular soap after you have used the CHG soap solution  Pat yourself dry with a clean, freshly-laundered towel.  DO NOT apply powders, deodorants, or lotions.  Dress in clean, freshly laundered nightclothes.    Be sure to sleep with clean, freshly laundered sheets.  Be aware that CHG will cause stains on fabrics; if you wash them with bleach after use.  Rinse your washcloth and other linens that have contact with CHG completely.  Use only non-chlorine detergents to launder the items used.   The morning of surgery is the fifth day.  Repeat the above steps and dress in clean comfortable clothing     Whom should I contact if I have any questions regarding the use of CHG soap?  Call the [facility], [facility] at [phone] if you have any questions.               Preoperative Brain Exercises    What are brain exercises?  A brain exercise is any activity that engages your thinking (cognitive) skills.    What types of activities are  considered brain exercises?  Jigsaw puzzles, crossword puzzles, word jumble, memory games, word search, and many more.  Many can be found free online or on your phone via a mobile steven.    Why should I do brain exercises before my surgery?  More recent research has shown brain exercise before surgery can lower the risk of postoperative delirium (confusion) which can be especially important for older adults.  Patients who did brain exercises for 5 to 10 hours the days before surgery, cut their risk of postoperative delirium in half up to 1 week after surgery.         [facility]    Preoperative Deep Breathing Exercises    Why it is important to do deep breathing exercises before my surgery?  Deep breathing exercises strengthen your breathing muscles.  This helps you to recover after your surgery and decreases the chance of breathing complications.      How are the deep breathing exercises done?  Sit straight with your back supported.  Breathe in deeply and slowly through your nose. Your lower rib cage should expand and your abdomen may move forward.  Hold that breath for 3 to 5 seconds.  Breathe out through pursed lips, slowly and completely.  Rest and repeat 10 times every hour while awake.  Rest longer if you become dizzy or lightheaded.         Patient and Family Education             Ways You Can Help Prevent Blood Clots             This handout explains some simple things you can do to help prevent blood clots.      Blood clots are blockages that can form in the body's veins. When a blood clot forms in your deep veins, it may be called a deep vein thrombosis, or DVT for short. Blood clots can happen in any part of the body where blood flows, but they are most common in the arms and legs. If a piece of a blood clot breaks free and travels to the lungs, it is called a pulmonary embolus (PE). A PE can be a very serious problem.         Being in the hospital or having surgery can raise your  chances of getting a blood clot because you may not be well enough to move around as much as you normally do.         Ways you can help prevent blood clots in the hospital         Wearing SCDs. SCDs stands for Sequential Compression Devices.   SCDs are special sleeves that wrap around your legs  They attach to a pump that fills them with air to gently squeeze your legs every few minutes.   This helps return the blood in your legs to your heart.   SCDs should only be taken off when walking or bathing.   SCDs may not be comfortable, but they can help save your life.               Wearing compression stockings - if your doctor orders them. These special snug fitting stockings gently squeeze your legs to help blood flow.       Walking. Walking helps move the blood in your legs.   If your doctor says it is ok, try walking the halls at least   5 times a day. Ask us to help you get up, so you don't fall.      Taking any blood thinning medicines your doctor orders.        Page 1 of 2     [facility]; 3/23   Ways you can help prevent blood clots at home       Wearing compression stockings - if your doctor orders them. ? Walking - to help move the blood in your legs.       Taking any blood thinning medicines your doctor orders.      Signs of a blood clot or PE      Tell your doctor or nurse know right away if you have of the problems listed below.    If you are at home, seek medical care right away. Call 911 for chest pain or problems breathing.               Signs of a blood clot (DVT) - such as pain,  swelling, redness or warmth in your arm or leg      Signs of a pulmonary embolism (PE) - such as chest     pain or feeling short of breath

## 2024-07-25 NOTE — TELEPHONE ENCOUNTER
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    Please advise if there are any instructions for patient that she will need to know prior to that procedure from our office.    Electronically signed by Denia Rosenthal CMA on 7/25/2024 at 12:07 PM

## 2024-07-25 NOTE — CPM/PAT H&P
CPM/PAT Evaluation       Name: Bethany Cuenca (Cheryl Makoski)  /Age: 1955/69 y.o.     Visit Type:   In-Person       Chief Complaint: Amyloidosis of lung (Multi)    HPI  Patient is a 69-year-old female with a past medical history significant for cataracts status post repair, osteoarthritis and amyloidosis of right lung.  Patient is being evaluated in [facility] in anticipation of a robotic assisted right upper lobe wedge resection with mediastinal lymphadenectomy with Dr. Jillian Tubbs on 24.  Past Medical History:   Diagnosis Date    Amyloidosis of lung (Multi)     Arthritis     Cataract      Past Surgical History:   Procedure Laterality Date    CATARACT EXTRACTION      DILATION AND CURETTAGE OF UTERUS      HIP ARTHROPLASTY      bilateral    SHOULDER ARTHROSCOPY Left        Patient Sexual activity questions deferred to the physician.    Family History   Problem Relation Name Age of Onset    Lung cancer Father      Pancreatic cancer Brother         Allergies   Allergen Reactions    Bee Venom Protein (Honey Bee) Hives and Swelling     Does not have epi pen    Penicillins Hives    Iodine Itching and Swelling     Swelling around the vaginal area       Prior to Admission medications    Medication Sig Start Date End Date Taking? Authorizing Provider   cholecalciferol (Vitamin D-3) 25 MCG (1000 UT) capsule Take 1 capsule (25 mcg) by mouth once daily.   Yes Historical Provider, MD   venlafaxine (Effexor) 25 mg tablet Take 1 tablet (25 mg) by mouth once daily.   Yes Historical Provider, MD   ferrous sulfate 325 (65 Fe) MG EC tablet Take 1 tablet by mouth every other day. Do not crush, chew, or split. 23  Cara Carrillo MD   gabapentin (Neurontin) 100 mg capsule  23   Historical Provider, MD   naltrexone (Depade) 50 mg tablet Take 4 mg by mouth once daily.    Historical Provider, MD   pantoprazole (ProtoNix) 40 mg EC tablet Take 1 tablet (40 mg) by mouth once daily in the morning. Take before  meals. 8/30/23   Historical Provider, MD MCMAHON ROS:   Constitutional:   neg    Neuro/Psych:   neg    Eyes:    use of corrective lenses  Ears:   neg    Nose:   neg    Mouth:   neg    Throat:   neg    Neck:   neg    Cardio:   neg    Respiratory:    cough  Endocrine:   neg    GI:   neg    :   neg    Musculoskeletal:    arthralgias  Hematologic:   neg    Skin:  neg        Physical Exam  Constitutional:       Appearance: Normal appearance.   HENT:      Head: Normocephalic and atraumatic.      Nose: Congestion present.      Mouth/Throat:      Mouth: Mucous membranes are moist.   Cardiovascular:      Rate and Rhythm: Normal rate and regular rhythm.      Pulses: Normal pulses.      Heart sounds: Normal heart sounds.   Pulmonary:      Effort: Pulmonary effort is normal.      Breath sounds: Normal breath sounds.   Abdominal:      Palpations: Abdomen is soft.   Musculoskeletal:         General: Normal range of motion.      Cervical back: Normal range of motion.   Skin:     General: Skin is warm.   Neurological:      General: No focal deficit present.      Mental Status: She is alert and oriented to person, place, and time.   Psychiatric:         Mood and Affect: Mood normal.         Behavior: Behavior normal.          PAT AIRWAY:   Airway:     Mallampati::  II    TM distance::  >3 FB    Neck ROM::  Full  normal        Visit Vitals  /77   Pulse 65   Temp 36.4 °C (97.6 °F)   Resp 16       DASI Risk Score      Flowsheet Row Most Recent Value   DASI SCORE 58.2   METS Score (Will be calculated only when all the questions are answered) 9.9          Caprini DVT Assessment      Flowsheet Row Most Recent Value   DVT Score 9   Current Status Major surgery planned, lasting over 3 hours   History Prior major surgery   Age 60-75 years   BMI 30 or less          Modified Frailty Index      Flowsheet Row Most Recent Value   Modified Frailty Index Calculator 0          CHADS2 Stroke Risk  Current as of 31 minutes ago        N/A 3  to 100%: High Risk   2 to < 3%: Medium Risk   0 to < 2%: Low Risk     Last Change: N/A          This score determines the patient's risk of having a stroke if the patient has atrial fibrillation.        This score is not applicable to this patient. Components are not calculated.          Revised Cardiac Risk Index      Flowsheet Row Most Recent Value   Revised Cardiac Risk Calculator 1          Apfel Simplified Score      Flowsheet Row Most Recent Value   Apfel Simplified Score Calculator 3          Risk Analysis Index Results This Encounter    No data found in the last 1 encounters.       Stop Bang Score      Flowsheet Row Most Recent Value   Do you snore loudly? 0   Do you often feel tired or fatigued after your sleep? 0   Has anyone ever observed you stop breathing in your sleep? 0   Do you have or are you being treated for high blood pressure? 0   Recent BMI (Calculated) 26.9   Is BMI greater than 35 kg/m2? 0=No   Age older than 50 years old? 1=Yes   Is your neck circumference greater than 17 inches (Male) or 16 inches (Female)? 0   Gender - Male 0=No   STOP-BANG Total Score 1            Assessment and Plan:     Anesthesia:  The patient denies problems with anesthesia in the past such as PONV, prolonged sedation, awareness, dental damage, aspiration, cardiac arrest, difficult intubation, or unexpected hospital admissions.     Neuro:   The patient has no neurological diagnoses or significant findings on chart review, clinical presentation, and evaluation. No grossly apparent neurological perioperative risk. The patient is at increased risk for postoperative delirium secondary to age 65 or older. The patient is at increased risk for perioperative stroke secondary to increased age, female gender, general anesthesia, operative time >2.5 hours. Handouts for preoperative brain exercises given to patient.    HEENT/Airway  No diagnoses, significant findings on chart review, clinical presentation, or  evaluation.    Cardiovascular  The patient is scheduled for non-cardiac surgery associated with elevated risk. The patient has no major cardiac contraindications to non- cardiac surgery.  RCRI  The patient meets 0-1 RCRI criteria and therefore has a less than 1% risk of major adverse cardiac complications.  METS  The patient's functional capacity capacity is greater than 4 METS.  EKG  The patient has no EKG or echocardiographic changes concerning for myocardial ischemia.   Heart Failure  The patient has no known history of heart failure.  Additionally, the patient reports no symptoms of heart failure and demonstrates no signs of heart failure.  Hypertension Evaluation  The patient has no known history of hypertension and has a normal blood pressure today.  Heart Rhythm Evaluation  The patient has no history of arrhythmias.  Heart Valve Evaluation  The patient has no known history of valvular heart disease. The patient has no symptoms or physical exam findings to suggest valvular heart disease.  Cardiology Evaluation  The patient is not followed by cardiology.    The patient has a 30-day risk for MACE of 0 predictors, 3.9% risk for cardiac death, nonfatal myocardial infarction, and nonfatal cardiac arrest.  LUCY score which indicates a 0.1% risk of intraoperative or 30-day postoperative MACE (major adverse cardiac event).    Pulmonary   The patient has findings on chart review, clinical presentation and evaluation significant for amyloidosis of lung . The patient is at increased risk of perioperative pulmonary complications secondary to thoracic surgery, advanced age greater than 60.    The patient has a stop bang score of 1, which places patient at low risk for having RICHI.    ARISCAT 50, High, 42.1% risk of in-hospital postoperative pulmonary complications  PRODIGY 8, low risk of respiratory depression episode. Patient given PI sheet for preoperative deep breathing exercises.    Hematology  No diagnoses or  significant findings on chart review or clinical presentation and evaluation.  Antiplatelet management   The patient is not currently receiving antiplatelet therapy.  Anticoagulation management  The patient is not currently receiving anticoagulation therapy.    Caprini score 9, high risk of perioperative VTE.     Patient instructed to ambulate as soon as possible postoperatively to decrease thromboembolic risk. Initiate mechanical DVT prophylaxis as soon as possible and initiate chemical prophylaxis when deemed safe from a bleeding standpoint post surgery.     Transfusion Evaluation  A type and screen was obtained given the likelihood for perioperative transfusion of blood or blood products.    Gastrointestinal  No diagnoses or significant findings on chart review or clinical presentation and evaluation.    Eat 10- 0,  self-perceived oropharyngeal dysphagia scale (0-40)     Genitourinary  No diagnoses or significant findings on chart review or clinical presentation and evaluation.    Renal  The patient has no known history of chronic kidney disease. The patient has specific risk factors associated with increased risk of perioperative renal complications related to age greater than 55.    Musculoskeletal  No diagnoses or significant findings on chart review or clinical presentation and evaluation.    Endocrine  Diabetes Evaluation  The patient has no history of diabetes mellitus.  Thyroid Disease Evaluation  The patient has no history of thyroid disease.    ID  No diagnoses or significant findings on chart review or clinical presentation and evaluation. MRSA screening obtained. Prescriptions and instructions given for Hibiclens and Peridex.    -Preoperative medication instructions were provided and reviewed with the patient.  Any additional testing or evaluation was explained to the patient.  NPO Instructions were discussed, and the patient's questions were answered prior to conclusion of this encounter.     Recent  Results (from the past 336 hour(s))   Lactate Dehydrogenase    Collection Time: 07/23/24  3:49 PM   Result Value Ref Range     84 - 246 U/L   CBC and Auto Differential    Collection Time: 07/23/24  3:49 PM   Result Value Ref Range    WBC 4.7 4.4 - 11.3 x10*3/uL    nRBC 0.0 0.0 - 0.0 /100 WBCs    RBC 4.62 4.00 - 5.20 x10*6/uL    Hemoglobin 13.1 12.0 - 16.0 g/dL    Hematocrit 40.3 36.0 - 46.0 %    MCV 87 80 - 100 fL    MCH 28.4 26.0 - 34.0 pg    MCHC 32.5 32.0 - 36.0 g/dL    RDW 14.8 (H) 11.5 - 14.5 %    Platelets 248 150 - 450 x10*3/uL    Neutrophils % 68.5 40.0 - 80.0 %    Immature Granulocytes %, Automated 0.4 0.0 - 0.9 %    Lymphocytes % 20.9 13.0 - 44.0 %    Monocytes % 9.2 2.0 - 10.0 %    Eosinophils % 0.4 0.0 - 6.0 %    Basophils % 0.6 0.0 - 2.0 %    Neutrophils Absolute 3.21 1.20 - 7.70 x10*3/uL    Immature Granulocytes Absolute, Automated 0.02 0.00 - 0.70 x10*3/uL    Lymphocytes Absolute 0.98 (L) 1.20 - 4.80 x10*3/uL    Monocytes Absolute 0.43 0.10 - 1.00 x10*3/uL    Eosinophils Absolute 0.02 0.00 - 0.70 x10*3/uL    Basophils Absolute 0.03 0.00 - 0.10 x10*3/uL   Comprehensive Metabolic Panel    Collection Time: 07/23/24  3:49 PM   Result Value Ref Range    Glucose 77 74 - 99 mg/dL    Sodium 136 136 - 145 mmol/L    Potassium 3.9 3.5 - 5.3 mmol/L    Chloride 102 98 - 107 mmol/L    Bicarbonate 28 21 - 32 mmol/L    Anion Gap 10 10 - 20 mmol/L    Urea Nitrogen 16 6 - 23 mg/dL    Creatinine 0.97 0.50 - 1.05 mg/dL    eGFR 64 >60 mL/min/1.73m*2    Calcium 9.3 8.6 - 10.3 mg/dL    Albumin 4.0 3.4 - 5.0 g/dL    Alkaline Phosphatase 83 33 - 136 U/L    Total Protein 9.2 (H) 6.4 - 8.2 g/dL    AST 17 9 - 39 U/L    Bilirubin, Total 0.3 0.0 - 1.2 mg/dL    ALT 9 7 - 45 U/L   Immunoglobulins (IgG, IgA, IgM)    Collection Time: 07/23/24  3:49 PM   Result Value Ref Range    IgG 3,100 (H) 700 - 1,600 mg/dL    IgA 233 70 - 400 mg/dL    IgM 184 40 - 230 mg/dL   Sammy Martinez/Lambda Free Light Chain, Serum    Collection Time: 07/23/24   3:49 PM   Result Value Ref Range    Ig Kappa Free Light Chain 5.41 (H) 0.33 - 1.94 mg/dL    Ig Lambda Free Light Chain 4.19 (H) 0.57 - 2.63 mg/dL    Kappa/Lambda Ratio 1.29 0.26 - 1.65   Protein, Total    Collection Time: 07/23/24  3:49 PM   Result Value Ref Range    Total Protein 8.9 (H) 6.4 - 8.2 g/dL   Serum Protein Electrophoresis + Immunofixation    Collection Time: 07/23/24  3:49 PM   Result Value Ref Range    Albumin 4.1 3.4 - 5.0 g/dL    Alpha 1 Globulin 0.3 0.2 - 0.6 g/dL    Alpha 2 Globulin 0.8 0.4 - 1.1 g/dL    Beta Globulin 1.0 0.5 - 1.2 g/dL    Gamma 2.8 (H) 0.5 - 1.4 g/dL    Protein Electrophoresis Comment Increase in polyclonal gamma globulins.       Immunofixation Comment No monoclonal protein detected by immunofixation.     Path Review - Serum Protein Electrophoresis        Reviewed and approved by ANISH WEBSTER on 7/25/24 at 6:22 PM.        Path Review - Serum Immunofixation       Reviewed and approved by ANISH WEBSTER on 7/25/24 at 6:22 PM.       Protein, Urine Random    Collection Time: 07/23/24  3:49 PM   Result Value Ref Range    Total Protein, Urine Random 9 5 - 25 mg/dL   Urine Protein Electrophoresis + Immunofixation    Collection Time: 07/23/24  3:49 PM   Result Value Ref Range    Albumin % 23.7 %    Alpha 1 Globulin % 5.9 %    Alpha 2 Globulin % 16.2 %    Beta Globulin % 29.1 %    Gamma Globulin % 25.1 %    Urine Electrophoresis Comment Normal.        Path Review-Urine Protein Electrophoresis        Reviewed and approved by ANISH WEBSTER on 7/25/24 at 5:31 PM.        Path Review - Urine Immunofixation       Reviewed and approved by ANISH WEBSTER on 7/25/24 at 5:31 PM.        Immunofixation Comment No monoclonal protein detected by immunofixation.    Type And Screen    Collection Time: 07/25/24 11:06 AM   Result Value Ref Range    ABO TYPE O     Rh TYPE POS     ANTIBODY SCREEN NEG    Staphylococcus aureus/MRSA colonization, Culture    Collection Time: 07/25/24 11:10 AM    Specimen:  Nares/Axilla/Groin; Swab   Result Value Ref Range    Staph/MRSA Screen Culture No Staphylococcus aureus isolated

## 2024-07-27 LAB — STAPHYLOCOCCUS SPEC CULT: NORMAL

## 2024-08-06 ENCOUNTER — TELEMEDICINE (OUTPATIENT)
Dept: HEMATOLOGY/ONCOLOGY | Facility: HOSPITAL | Age: 69
End: 2024-08-06
Payer: MEDICARE

## 2024-08-06 DIAGNOSIS — E85.9 AMYLOIDOSIS, UNSPECIFIED TYPE (MULTI): Primary | ICD-10-CM

## 2024-08-06 PROCEDURE — 99214 OFFICE O/P EST MOD 30 MIN: CPT | Performed by: INTERNAL MEDICINE

## 2024-08-06 PROCEDURE — 1036F TOBACCO NON-USER: CPT | Performed by: INTERNAL MEDICINE

## 2024-08-06 NOTE — PROGRESS NOTES
Patient ID: Bethany Cuenca is a 69 y.o. female.  Referring Physician: No referring provider defined for this encounter.  Primary Care Provider: Jamil Tomlin MD      Subjective    Patient with newly diagnosed amyloidosis from lung nodule is here for further discussion and management.  Patient developed lung nodule RUL(1.9x1.7cm x 0.5 cm, SUV 3.7) in 10/2023 and biopsy was consistent with amyloidosis.(Congo red positive)  Follow up CT 6/21/24 showed persistent nodule(2.2x1.9x0.5 cm). Wedge resection is planned on 8/8/24.   BW 11/27/23 showed IgG 3540, Kappa LC 7.06 mg/dL, Lambda 4.63, K/L ratio normal(1.52) SPEP/UPEP showed no paraproteins. Hgb, Cr, alb, Ca from 7/23/24 were all WNL.    7/23/24: first visit with me. She remains asymptomatic. No recent weight loss, SOB, night sweats or f/c/n/v/d.   8/6/24: follow up virtual visit for BW review. No new c/o      Review of Systems   All other systems reviewed and are negative.    PMHx; Sjogren's syndrome(dx 2011), PUD,  bilateral hip replacement      Objective   BSA: There is no height or weight on file to calculate BSA.  There were no vitals taken for this visit.    Family History   Problem Relation Name Age of Onset    Lung cancer Father      Pancreatic cancer Brother       Oncology History    No history exists.       Bethany Cuenca  reports that she has never smoked. She has been exposed to tobacco smoke. She has never used smokeless tobacco.  She  reports that she does not currently use alcohol.  She  reports no history of drug use.    Physical Exam  Constitutional:       Appearance: Normal appearance.   HENT:      Head: Normocephalic and atraumatic.      Mouth/Throat:      Mouth: Mucous membranes are moist.      Pharynx: Oropharynx is clear.   Eyes:      Extraocular Movements: Extraocular movements intact.      Pupils: Pupils are equal, round, and reactive to light.   Cardiovascular:      Rate and Rhythm: Normal rate and regular rhythm.      Heart sounds: No  murmur heard.  Pulmonary:      Effort: Pulmonary effort is normal.      Breath sounds: Normal breath sounds.   Abdominal:      General: Abdomen is flat. Bowel sounds are normal.      Palpations: Abdomen is soft.   Musculoskeletal:         General: Normal range of motion.      Cervical back: Normal range of motion and neck supple.      Right lower leg: No edema.      Left lower leg: No edema.   Skin:     General: Skin is warm and dry.   Neurological:      General: No focal deficit present.      Mental Status: She is alert.   Psychiatric:         Mood and Affect: Mood normal.         Behavior: Behavior normal.         Judgment: Judgment normal.         Performance Status:  Asymptomatic      Assessment/Plan    Pulmonary nodule, RUL-> Bx congo red positive.(Amyloidosis)  Wedge resection scheduled on 8/8/24.  - agree with surgery plan. If still positive for congo red, need to send out for mass spect([facility]) for typing  - patient denies any other symptoms.  - Hgb, Cr, alb, Ca, LDH from today 7/23/24 were all WNL  - IgG 3100 Kappa 5.4, Lambda 4.19 K/L ratio 1.29   - SPEP and UPEP negative for paraprotein.    RTC after surgery         Ok-Lori Shanks MD

## 2024-08-07 NOTE — PROGRESS NOTES
Pharmacy Medication History Review    Bethany Cuenca is a 69 y.o. female who is planned to be admitted for Amyloidosis of lung (Multi). Pharmacy called the patient prior to their scheduled procedure and reviewed the patient's [id number] medications for accuracy.    Medications ADDED:  none  Medications CHANGED:  none  Medications REMOVED:   Ferrous sulfate 325mg  Gabapentin 100mg  Naltrexone  Pantoprazole 40mg    Please review updated prior to admission medication list and comments regarding how patient may be taking medications differently by going to Admission tab --> Admission Orders --> Admit Orders / Review prior to admission medications.     Preferred pharmacy and allergies to be confirmed with patient by nursing the day of procedure.     Sources used to complete the med history include spoke with patient, surescripts, oarrs, care everywhere    Below are additional concerns with the patient's PTA list.  Patient states they only take venlafaxine and vitamin d3 at this time    Cara Coleman    [facility]  Please reach out via Secure Chat for questions

## 2024-08-08 ENCOUNTER — HOSPITAL ENCOUNTER (INPATIENT)
Facility: HOSPITAL | Age: 69
LOS: 1 days | Discharge: HOME | End: 2024-08-09
Attending: THORACIC SURGERY (CARDIOTHORACIC VASCULAR SURGERY) | Admitting: THORACIC SURGERY (CARDIOTHORACIC VASCULAR SURGERY)
Payer: MEDICARE

## 2024-08-08 ENCOUNTER — ANESTHESIA (OUTPATIENT)
Dept: OPERATING ROOM | Facility: HOSPITAL | Age: 69
End: 2024-08-08
Payer: MEDICARE

## 2024-08-08 ENCOUNTER — APPOINTMENT (OUTPATIENT)
Dept: RADIOLOGY | Facility: HOSPITAL | Age: 69
End: 2024-08-08
Payer: MEDICARE

## 2024-08-08 ENCOUNTER — ANESTHESIA EVENT (OUTPATIENT)
Dept: OPERATING ROOM | Facility: HOSPITAL | Age: 69
End: 2024-08-08
Payer: MEDICARE

## 2024-08-08 ENCOUNTER — APPOINTMENT (OUTPATIENT)
Dept: CARDIOLOGY | Facility: HOSPITAL | Age: 69
End: 2024-08-08
Payer: MEDICARE

## 2024-08-08 DIAGNOSIS — E85.4 AMYLOIDOSIS OF LUNG (MULTI): ICD-10-CM

## 2024-08-08 DIAGNOSIS — G89.18 POST-OP PAIN: Primary | ICD-10-CM

## 2024-08-08 DIAGNOSIS — J99 AMYLOIDOSIS OF LUNG (MULTI): ICD-10-CM

## 2024-08-08 DIAGNOSIS — R91.1 LUNG NODULE: ICD-10-CM

## 2024-08-08 PROCEDURE — 0BBC4ZZ EXCISION OF RIGHT UPPER LUNG LOBE, PERCUTANEOUS ENDOSCOPIC APPROACH: ICD-10-PCS | Performed by: THORACIC SURGERY (CARDIOTHORACIC VASCULAR SURGERY)

## 2024-08-08 PROCEDURE — 93005 ELECTROCARDIOGRAM TRACING: CPT

## 2024-08-08 PROCEDURE — 2500000005 HC RX 250 GENERAL PHARMACY W/O HCPCS

## 2024-08-08 PROCEDURE — 2500000001 HC RX 250 WO HCPCS SELF ADMINISTERED DRUGS (ALT 637 FOR MEDICARE OP)

## 2024-08-08 PROCEDURE — 32666 THORACOSCOPY W/WEDGE RESECT: CPT | Performed by: THORACIC SURGERY (CARDIOTHORACIC VASCULAR SURGERY)

## 2024-08-08 PROCEDURE — 2500000001 HC RX 250 WO HCPCS SELF ADMINISTERED DRUGS (ALT 637 FOR MEDICARE OP): Performed by: PHYSICIAN ASSISTANT

## 2024-08-08 PROCEDURE — 8E0W4CZ ROBOTIC ASSISTED PROCEDURE OF TRUNK REGION, PERCUTANEOUS ENDOSCOPIC APPROACH: ICD-10-PCS | Performed by: THORACIC SURGERY (CARDIOTHORACIC VASCULAR SURGERY)

## 2024-08-08 PROCEDURE — 2720000007 HC OR 272 NO HCPCS: Performed by: THORACIC SURGERY (CARDIOTHORACIC VASCULAR SURGERY)

## 2024-08-08 PROCEDURE — 32666 THORACOSCOPY W/WEDGE RESECT: CPT | Performed by: PHYSICIAN ASSISTANT

## 2024-08-08 PROCEDURE — 31622 DX BRONCHOSCOPE/WASH: CPT | Performed by: THORACIC SURGERY (CARDIOTHORACIC VASCULAR SURGERY)

## 2024-08-08 PROCEDURE — 7100000002 HC RECOVERY ROOM TIME - EACH INCREMENTAL 1 MINUTE: Performed by: THORACIC SURGERY (CARDIOTHORACIC VASCULAR SURGERY)

## 2024-08-08 PROCEDURE — 88307 TISSUE EXAM BY PATHOLOGIST: CPT | Mod: TC,SUR | Performed by: THORACIC SURGERY (CARDIOTHORACIC VASCULAR SURGERY)

## 2024-08-08 PROCEDURE — 7100000001 HC RECOVERY ROOM TIME - INITIAL BASE CHARGE: Performed by: THORACIC SURGERY (CARDIOTHORACIC VASCULAR SURGERY)

## 2024-08-08 PROCEDURE — 32674 THORACOSCOPY LYMPH NODE EXC: CPT | Performed by: PHYSICIAN ASSISTANT

## 2024-08-08 PROCEDURE — 3600000010 HC OR TIME - EACH INCREMENTAL 1 MINUTE - PROCEDURE LEVEL FIVE: Performed by: THORACIC SURGERY (CARDIOTHORACIC VASCULAR SURGERY)

## 2024-08-08 PROCEDURE — 2500000004 HC RX 250 GENERAL PHARMACY W/ HCPCS (ALT 636 FOR OP/ED): Performed by: PHYSICIAN ASSISTANT

## 2024-08-08 PROCEDURE — 3600000005 HC OR TIME - INITIAL BASE CHARGE - PROCEDURE LEVEL FIVE: Performed by: THORACIC SURGERY (CARDIOTHORACIC VASCULAR SURGERY)

## 2024-08-08 PROCEDURE — 3700000002 HC GENERAL ANESTHESIA TIME - EACH INCREMENTAL 1 MINUTE: Performed by: THORACIC SURGERY (CARDIOTHORACIC VASCULAR SURGERY)

## 2024-08-08 PROCEDURE — 1200000002 HC GENERAL ROOM WITH TELEMETRY DAILY

## 2024-08-08 PROCEDURE — 0BJ08ZZ INSPECTION OF TRACHEOBRONCHIAL TREE, VIA NATURAL OR ARTIFICIAL OPENING ENDOSCOPIC: ICD-10-PCS | Performed by: THORACIC SURGERY (CARDIOTHORACIC VASCULAR SURGERY)

## 2024-08-08 PROCEDURE — 71045 X-RAY EXAM CHEST 1 VIEW: CPT | Performed by: RADIOLOGY

## 2024-08-08 PROCEDURE — 2780000003 HC OR 278 NO HCPCS: Performed by: THORACIC SURGERY (CARDIOTHORACIC VASCULAR SURGERY)

## 2024-08-08 PROCEDURE — 2500000004 HC RX 250 GENERAL PHARMACY W/ HCPCS (ALT 636 FOR OP/ED)

## 2024-08-08 PROCEDURE — 07T74ZZ RESECTION OF THORAX LYMPHATIC, PERCUTANEOUS ENDOSCOPIC APPROACH: ICD-10-PCS | Performed by: THORACIC SURGERY (CARDIOTHORACIC VASCULAR SURGERY)

## 2024-08-08 PROCEDURE — 2500000005 HC RX 250 GENERAL PHARMACY W/O HCPCS: Performed by: THORACIC SURGERY (CARDIOTHORACIC VASCULAR SURGERY)

## 2024-08-08 PROCEDURE — 71045 X-RAY EXAM CHEST 1 VIEW: CPT

## 2024-08-08 PROCEDURE — 32674 THORACOSCOPY LYMPH NODE EXC: CPT | Performed by: THORACIC SURGERY (CARDIOTHORACIC VASCULAR SURGERY)

## 2024-08-08 PROCEDURE — 3700000001 HC GENERAL ANESTHESIA TIME - INITIAL BASE CHARGE: Performed by: THORACIC SURGERY (CARDIOTHORACIC VASCULAR SURGERY)

## 2024-08-08 PROCEDURE — A32666 PR THORACOSCOPY W/THERA WEDGE RESEXN INITIAL UNILAT: Performed by: STUDENT IN AN ORGANIZED HEALTH CARE EDUCATION/TRAINING PROGRAM

## 2024-08-08 RX ORDER — NALOXONE HYDROCHLORIDE 0.4 MG/ML
0.2 INJECTION, SOLUTION INTRAMUSCULAR; INTRAVENOUS; SUBCUTANEOUS AS NEEDED
Status: DISCONTINUED | OUTPATIENT
Start: 2024-08-08 | End: 2024-08-09 | Stop reason: HOSPADM

## 2024-08-08 RX ORDER — ALBUTEROL SULFATE 0.83 MG/ML
2.5 SOLUTION RESPIRATORY (INHALATION) ONCE
Status: DISCONTINUED | OUTPATIENT
Start: 2024-08-08 | End: 2024-08-08 | Stop reason: HOSPADM

## 2024-08-08 RX ORDER — AMOXICILLIN 250 MG
2 CAPSULE ORAL 2 TIMES DAILY
Status: DISCONTINUED | OUTPATIENT
Start: 2024-08-08 | End: 2024-08-09 | Stop reason: HOSPADM

## 2024-08-08 RX ORDER — CEFAZOLIN SODIUM 2 G/100ML
2 INJECTION, SOLUTION INTRAVENOUS ONCE
Status: DISCONTINUED | OUTPATIENT
Start: 2024-08-08 | End: 2024-08-08 | Stop reason: HOSPADM

## 2024-08-08 RX ORDER — ACETAMINOPHEN 325 MG/1
650 TABLET ORAL EVERY 6 HOURS
Status: DISCONTINUED | OUTPATIENT
Start: 2024-08-08 | End: 2024-08-09 | Stop reason: HOSPADM

## 2024-08-08 RX ORDER — ONDANSETRON HYDROCHLORIDE 2 MG/ML
4 INJECTION, SOLUTION INTRAVENOUS EVERY 8 HOURS PRN
Status: DISCONTINUED | OUTPATIENT
Start: 2024-08-08 | End: 2024-08-09 | Stop reason: HOSPADM

## 2024-08-08 RX ORDER — VENLAFAXINE 25 MG/1
25 TABLET ORAL DAILY
Status: DISCONTINUED | OUTPATIENT
Start: 2024-08-09 | End: 2024-08-09 | Stop reason: HOSPADM

## 2024-08-08 RX ORDER — HEPARIN SODIUM 5000 [USP'U]/ML
5000 INJECTION, SOLUTION INTRAVENOUS; SUBCUTANEOUS EVERY 8 HOURS
Status: DISCONTINUED | OUTPATIENT
Start: 2024-08-08 | End: 2024-08-09 | Stop reason: HOSPADM

## 2024-08-08 RX ORDER — FENTANYL CITRATE 50 UG/ML
INJECTION, SOLUTION INTRAMUSCULAR; INTRAVENOUS AS NEEDED
Status: DISCONTINUED | OUTPATIENT
Start: 2024-08-08 | End: 2024-08-08

## 2024-08-08 RX ORDER — DEXMEDETOMIDINE HYDROCHLORIDE 4 UG/ML
INJECTION, SOLUTION INTRAVENOUS CONTINUOUS PRN
Status: DISCONTINUED | OUTPATIENT
Start: 2024-08-08 | End: 2024-08-08

## 2024-08-08 RX ORDER — HYDROMORPHONE HYDROCHLORIDE 1 MG/ML
0.2 INJECTION, SOLUTION INTRAMUSCULAR; INTRAVENOUS; SUBCUTANEOUS EVERY 5 MIN PRN
Status: DISCONTINUED | OUTPATIENT
Start: 2024-08-08 | End: 2024-08-08 | Stop reason: HOSPADM

## 2024-08-08 RX ORDER — HYDROMORPHONE HCL/0.9% NACL/PF 15 MG/30ML
PATIENT CONTROLLED ANALGESIA SYRINGE INTRAVENOUS CONTINUOUS
Status: DISCONTINUED | OUTPATIENT
Start: 2024-08-08 | End: 2024-08-09

## 2024-08-08 RX ORDER — HYDROMORPHONE HYDROCHLORIDE 1 MG/ML
0.5 INJECTION, SOLUTION INTRAMUSCULAR; INTRAVENOUS; SUBCUTANEOUS EVERY 5 MIN PRN
Status: DISCONTINUED | OUTPATIENT
Start: 2024-08-08 | End: 2024-08-08 | Stop reason: HOSPADM

## 2024-08-08 RX ORDER — METHADONE IN SOD CHLOR,ISO-OSM 10 MG/ML
SYRINGE (ML) INTRAVENOUS AS NEEDED
Status: DISCONTINUED | OUTPATIENT
Start: 2024-08-08 | End: 2024-08-08

## 2024-08-08 RX ORDER — PROPOFOL 10 MG/ML
INJECTION, EMULSION INTRAVENOUS
Status: COMPLETED
Start: 2024-08-08 | End: 2024-08-08

## 2024-08-08 RX ORDER — ROCURONIUM BROMIDE 10 MG/ML
INJECTION, SOLUTION INTRAVENOUS AS NEEDED
Status: DISCONTINUED | OUTPATIENT
Start: 2024-08-08 | End: 2024-08-08

## 2024-08-08 RX ORDER — GABAPENTIN 300 MG/1
CAPSULE ORAL AS NEEDED
Status: DISCONTINUED | OUTPATIENT
Start: 2024-08-08 | End: 2024-08-08

## 2024-08-08 RX ORDER — CEFAZOLIN 1 G/1
INJECTION, POWDER, FOR SOLUTION INTRAVENOUS AS NEEDED
Status: DISCONTINUED | OUTPATIENT
Start: 2024-08-08 | End: 2024-08-08

## 2024-08-08 RX ORDER — CEFAZOLIN SODIUM 2 G/100ML
2 INJECTION, SOLUTION INTRAVENOUS EVERY 8 HOURS
Status: COMPLETED | OUTPATIENT
Start: 2024-08-08 | End: 2024-08-09

## 2024-08-08 RX ORDER — PROPOFOL 10 MG/ML
INJECTION, EMULSION INTRAVENOUS AS NEEDED
Status: DISCONTINUED | OUTPATIENT
Start: 2024-08-08 | End: 2024-08-08

## 2024-08-08 RX ORDER — LIDOCAINE HYDROCHLORIDE 20 MG/ML
INJECTION, SOLUTION INFILTRATION; PERINEURAL AS NEEDED
Status: DISCONTINUED | OUTPATIENT
Start: 2024-08-08 | End: 2024-08-08

## 2024-08-08 RX ORDER — SODIUM CHLORIDE 0.9 G/100ML
IRRIGANT IRRIGATION AS NEEDED
Status: DISCONTINUED | OUTPATIENT
Start: 2024-08-08 | End: 2024-08-08 | Stop reason: HOSPADM

## 2024-08-08 RX ORDER — FENTANYL CITRATE 50 UG/ML
INJECTION, SOLUTION INTRAMUSCULAR; INTRAVENOUS
Status: COMPLETED
Start: 2024-08-08 | End: 2024-08-08

## 2024-08-08 RX ORDER — SODIUM CHLORIDE, SODIUM LACTATE, POTASSIUM CHLORIDE, CALCIUM CHLORIDE 600; 310; 30; 20 MG/100ML; MG/100ML; MG/100ML; MG/100ML
100 INJECTION, SOLUTION INTRAVENOUS CONTINUOUS
Status: DISCONTINUED | OUTPATIENT
Start: 2024-08-08 | End: 2024-08-08 | Stop reason: HOSPADM

## 2024-08-08 RX ORDER — ONDANSETRON HYDROCHLORIDE 2 MG/ML
INJECTION, SOLUTION INTRAVENOUS AS NEEDED
Status: DISCONTINUED | OUTPATIENT
Start: 2024-08-08 | End: 2024-08-08

## 2024-08-08 RX ORDER — ONDANSETRON HYDROCHLORIDE 2 MG/ML
4 INJECTION, SOLUTION INTRAVENOUS ONCE AS NEEDED
Status: DISCONTINUED | OUTPATIENT
Start: 2024-08-08 | End: 2024-08-08 | Stop reason: HOSPADM

## 2024-08-08 RX ORDER — HEPARIN SODIUM 5000 [USP'U]/ML
INJECTION, SOLUTION INTRAVENOUS; SUBCUTANEOUS AS NEEDED
Status: DISCONTINUED | OUTPATIENT
Start: 2024-08-08 | End: 2024-08-08

## 2024-08-08 RX ORDER — BUPIVACAINE HCL/EPINEPHRINE 0.25-.0005
VIAL (ML) INJECTION AS NEEDED
Status: DISCONTINUED | OUTPATIENT
Start: 2024-08-08 | End: 2024-08-08 | Stop reason: HOSPADM

## 2024-08-08 RX ORDER — LIDOCAINE HYDROCHLORIDE 10 MG/ML
0.1 INJECTION INFILTRATION; PERINEURAL ONCE
Status: DISCONTINUED | OUTPATIENT
Start: 2024-08-08 | End: 2024-08-08 | Stop reason: HOSPADM

## 2024-08-08 RX ORDER — ACETAMINOPHEN 325 MG/1
TABLET ORAL AS NEEDED
Status: DISCONTINUED | OUTPATIENT
Start: 2024-08-08 | End: 2024-08-08

## 2024-08-08 RX ORDER — PHENYLEPHRINE HCL IN 0.9% NACL 0.4MG/10ML
SYRINGE (ML) INTRAVENOUS AS NEEDED
Status: DISCONTINUED | OUTPATIENT
Start: 2024-08-08 | End: 2024-08-08

## 2024-08-08 RX ORDER — LABETALOL HYDROCHLORIDE 5 MG/ML
5 INJECTION, SOLUTION INTRAVENOUS ONCE AS NEEDED
Status: DISCONTINUED | OUTPATIENT
Start: 2024-08-08 | End: 2024-08-08 | Stop reason: HOSPADM

## 2024-08-08 RX ORDER — IPRATROPIUM BROMIDE AND ALBUTEROL SULFATE 2.5; .5 MG/3ML; MG/3ML
3 SOLUTION RESPIRATORY (INHALATION) EVERY 6 HOURS PRN
Status: DISCONTINUED | OUTPATIENT
Start: 2024-08-08 | End: 2024-08-09 | Stop reason: HOSPADM

## 2024-08-08 SDOH — SOCIAL STABILITY: SOCIAL INSECURITY: HAS ANYONE EVER THREATENED TO HURT YOUR FAMILY OR YOUR PETS?: NO

## 2024-08-08 SDOH — SOCIAL STABILITY: SOCIAL INSECURITY: ARE THERE ANY APPARENT SIGNS OF INJURIES/BEHAVIORS THAT COULD BE RELATED TO ABUSE/NEGLECT?: NO

## 2024-08-08 SDOH — SOCIAL STABILITY: SOCIAL INSECURITY: HAVE YOU HAD THOUGHTS OF HARMING ANYONE ELSE?: NO

## 2024-08-08 SDOH — SOCIAL STABILITY: SOCIAL INSECURITY: WERE YOU ABLE TO COMPLETE ALL THE BEHAVIORAL HEALTH SCREENINGS?: YES

## 2024-08-08 SDOH — SOCIAL STABILITY: SOCIAL INSECURITY: DO YOU FEEL UNSAFE GOING BACK TO THE PLACE WHERE YOU ARE LIVING?: NO

## 2024-08-08 SDOH — SOCIAL STABILITY: SOCIAL INSECURITY: DO YOU FEEL ANYONE HAS EXPLOITED OR TAKEN ADVANTAGE OF YOU FINANCIALLY OR OF YOUR PERSONAL PROPERTY?: NO

## 2024-08-08 SDOH — SOCIAL STABILITY: SOCIAL INSECURITY: ARE YOU OR HAVE YOU BEEN THREATENED OR ABUSED PHYSICALLY, EMOTIONALLY, OR SEXUALLY BY ANYONE?: NO

## 2024-08-08 SDOH — SOCIAL STABILITY: SOCIAL INSECURITY: ABUSE: ADULT

## 2024-08-08 SDOH — SOCIAL STABILITY: SOCIAL INSECURITY: HAVE YOU HAD ANY THOUGHTS OF HARMING ANYONE ELSE?: NO

## 2024-08-08 SDOH — HEALTH STABILITY: MENTAL HEALTH: CURRENT SMOKER: 0

## 2024-08-08 SDOH — SOCIAL STABILITY: SOCIAL INSECURITY: DOES ANYONE TRY TO KEEP YOU FROM HAVING/CONTACTING OTHER FRIENDS OR DOING THINGS OUTSIDE YOUR HOME?: NO

## 2024-08-08 ASSESSMENT — PAIN SCALES - GENERAL
PAINLEVEL_OUTOF10: 0 - NO PAIN
PAINLEVEL_OUTOF10: 3
PAINLEVEL_OUTOF10: 2
PAINLEVEL_OUTOF10: 3
PAINLEVEL_OUTOF10: 0 - NO PAIN
PAINLEVEL_OUTOF10: 4
PAINLEVEL_OUTOF10: 0 - NO PAIN
PAINLEVEL_OUTOF10: 2
PAINLEVEL_OUTOF10: 3
PAINLEVEL_OUTOF10: 2
PAINLEVEL_OUTOF10: 3
PAINLEVEL_OUTOF10: 2
PAIN_LEVEL: 0
PAINLEVEL_OUTOF10: 3
PAINLEVEL_OUTOF10: 4
PAINLEVEL_OUTOF10: 4
PAINLEVEL_OUTOF10: 3
PAINLEVEL_OUTOF10: 3
PAINLEVEL_OUTOF10: 0 - NO PAIN
PAINLEVEL_OUTOF10: 3
PAINLEVEL_OUTOF10: 2
PAINLEVEL_OUTOF10: 2

## 2024-08-08 ASSESSMENT — PAIN - FUNCTIONAL ASSESSMENT
PAIN_FUNCTIONAL_ASSESSMENT: UNABLE TO SELF-REPORT
PAIN_FUNCTIONAL_ASSESSMENT: 0-10

## 2024-08-08 ASSESSMENT — LIFESTYLE VARIABLES
AUDIT-C TOTAL SCORE: 0
AUDIT-C TOTAL SCORE: 0
HOW OFTEN DO YOU HAVE A DRINK CONTAINING ALCOHOL: NEVER
SKIP TO QUESTIONS 9-10: 1
HOW OFTEN DO YOU HAVE 6 OR MORE DRINKS ON ONE OCCASION: NEVER
HOW MANY STANDARD DRINKS CONTAINING ALCOHOL DO YOU HAVE ON A TYPICAL DAY: PATIENT DOES NOT DRINK

## 2024-08-08 ASSESSMENT — COLUMBIA-SUICIDE SEVERITY RATING SCALE - C-SSRS
6. HAVE YOU EVER DONE ANYTHING, STARTED TO DO ANYTHING, OR PREPARED TO DO ANYTHING TO END YOUR LIFE?: NO
1. IN THE PAST MONTH, HAVE YOU WISHED YOU WERE DEAD OR WISHED YOU COULD GO TO SLEEP AND NOT WAKE UP?: NO
2. HAVE YOU ACTUALLY HAD ANY THOUGHTS OF KILLING YOURSELF?: NO

## 2024-08-08 ASSESSMENT — ACTIVITIES OF DAILY LIVING (ADL)
ADEQUATE_TO_COMPLETE_ADL: YES
DRESSING YOURSELF: INDEPENDENT
JUDGMENT_ADEQUATE_SAFELY_COMPLETE_DAILY_ACTIVITIES: YES
LACK_OF_TRANSPORTATION: NO
FEEDING YOURSELF: INDEPENDENT
PATIENT'S MEMORY ADEQUATE TO SAFELY COMPLETE DAILY ACTIVITIES?: YES
GROOMING: INDEPENDENT
BATHING: INDEPENDENT
ASSISTIVE_DEVICE: EYEGLASSES
HEARING - LEFT EAR: FUNCTIONAL
HEARING - RIGHT EAR: FUNCTIONAL
WALKS IN HOME: INDEPENDENT
TOILETING: INDEPENDENT

## 2024-08-08 ASSESSMENT — COGNITIVE AND FUNCTIONAL STATUS - GENERAL
MOBILITY SCORE: 20
DAILY ACTIVITIY SCORE: 21
WALKING IN HOSPITAL ROOM: A LITTLE
CLIMB 3 TO 5 STEPS WITH RAILING: A LITTLE
HELP NEEDED FOR BATHING: A LITTLE
STANDING UP FROM CHAIR USING ARMS: A LITTLE
DRESSING REGULAR UPPER BODY CLOTHING: A LITTLE
MOVING TO AND FROM BED TO CHAIR: A LITTLE
PATIENT BASELINE BEDBOUND: NO
TOILETING: A LITTLE

## 2024-08-08 ASSESSMENT — PAIN DESCRIPTION - ORIENTATION: ORIENTATION: RIGHT

## 2024-08-08 ASSESSMENT — PAIN DESCRIPTION - LOCATION: LOCATION: RIB CAGE

## 2024-08-08 ASSESSMENT — PATIENT HEALTH QUESTIONNAIRE - PHQ9
2. FEELING DOWN, DEPRESSED OR HOPELESS: NOT AT ALL
1. LITTLE INTEREST OR PLEASURE IN DOING THINGS: NOT AT ALL
SUM OF ALL RESPONSES TO PHQ9 QUESTIONS 1 & 2: 0

## 2024-08-08 ASSESSMENT — PAIN SCALES - PAIN ASSESSMENT IN ADVANCED DEMENTIA (PAINAD): TOTALSCORE: MEDICATION (SEE MAR)

## 2024-08-08 NOTE — CARE PLAN
Problem: Fall/Injury  Goal: Not fall by end of shift  Outcome: Progressing  Goal: Be free from injury by end of the shift  Outcome: Progressing  Goal: Verbalize understanding of personal risk factors for fall in the hospital  Outcome: Progressing  Goal: Verbalize understanding of risk factor reduction measures to prevent injury from fall in the home  Outcome: Progressing  Goal: Use assistive devices by end of the shift  Outcome: Progressing  Goal: Pace activities to prevent fatigue by end of the shift  Outcome: Progressing     Problem: Pain - Adult  Goal: Verbalizes/displays adequate comfort level or baseline comfort level  Outcome: Progressing     Problem: Safety - Adult  Goal: Free from fall injury  Outcome: Progressing     Problem: Discharge Planning  Goal: Discharge to home or other facility with appropriate resources  Outcome: Progressing     Problem: Chronic Conditions and Co-morbidities  Goal: Patient's chronic conditions and co-morbidity symptoms are monitored and maintained or improved  Outcome: Progressing     Problem: Respiratory  Goal: Clear secretions with interventions this shift  Outcome: Progressing  Goal: Minimize anxiety/maximize coping throughout shift  Outcome: Progressing  Goal: Minimal/no exertional discomfort or dyspnea this shift  Outcome: Progressing  Goal: No signs of respiratory distress (eg. Use of accessory muscles. Peds grunting)  Outcome: Progressing  Goal: Patent airway maintained this shift  Outcome: Progressing  Goal: Tolerate mechanical ventilation evidenced by VS/agitation level this shift  Outcome: Progressing  Goal: Tolerate pulmonary toileting this shift  Outcome: Progressing  Goal: Verbalize decreased shortness of breath this shift  Outcome: Progressing  Goal: Wean oxygen to maintain O2 saturation per order/standard this shift  Outcome: Progressing  Goal: Increase self care and/or family involvement in next 24 hours  Outcome: Progressing   The patient's goals for the shift  include      The clinical goals for the shift include Patient will remain HDS throughout shift.

## 2024-08-08 NOTE — OP NOTE
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]     Procedures  Resection Robot-Assisted Lung Wedge; Resection Robot-Assisted Lung Wedge [id number]  [id number] - [place] THORACOSCOPY W/THERA WEDGE RESEXN INITIAL UNILAT  Flexible bronchoscopy  Robotic assisted right upper lobe wedge resection  Robotic assisted mediastinal lymphadenectomy  Multilevel intercostal nerve blocks    Surgeons      * Christiano Tubbs - Primary    Resident/Fellow/Other Assistant:  Surgeons and Role:     * Loan Dimas PA-C - MARYANN First Assist    Procedure Summary  Anesthesia: General  ASA: II  Anesthesia Staff: Anesthesiologist: Jose Terrazas MD  Anesthesia Resident: Emery Esposito DO  Estimated Blood Loss: 10 mL  Intra-op Medications:   Administrations occurring from 0715 to 1030 on 24:   Medication Name Total Dose   sodium chloride 0.9 % irrigation solution 1,000 mL   BUPivacaine-EPINEPHrine (Marcaine w/EPI) 0.25 %-1:200,000 injection 30 mL   fentaNYL PF (Sublimaze) injection  - Omnicell Override Pull Cannot be calculated   propofol (Diprivan) injection  - Omnicell Override Pull Cannot be calculated   sugammadex (Bridion) injection  - Omnicell Override Pull Cannot be calculated   lactated Ringer's infusion Cannot be calculated   oxygen (O2) therapy 270 L              Anesthesia Record               Intraprocedure I/O Totals          Intake    Dexmedetomidine 0.00 mL    The total shown is the total volume documented since Anesthesia Start was filed.    LR bolus 400.00 mL    Total Intake 400 mL          Specimen:   ID Type Source Tests Collected by Time   1 : RIGHT UPPER LOBE WEDGE Tissue LUNG WEDGE RESECTION  RIGHT (SPECIFY SITE) SURGICAL PATHOLOGY EXAM Christiano Tubbs MD 8/8/2024 0804   2 : LEVEL 9 LYMPH NODE Tissue LYMPH NODE PULMONARY (SPECIFY SITE) SURGICAL PATHOLOGY EXAM Christiano Tubbs MD 8/8/2024 0813   3 : LEVEL 10 LYMPH NODE Tissue LYMPH NODE PULMONARY (SPECIFY SITE) SURGICAL PATHOLOGY EXAM Christiano Tubbs MD 8/8/2024 0822   4 : LEVEL 7 LYMPH NODE Tissue LYMPH NODE PULMONARY (SPECIFY SITE) SURGICAL PATHOLOGY EXAM Christiano Tubbs MD 8/8/2024 0822   5 : LEVEL 4R LYMPH NODE Tissue LYMPH NODE PULMONARY (SPECIFY SITE) SURGICAL PATHOLOGY EXAM Christiano Tubbs MD 8/8/2024 0828        Staff:   Cleveulator: Elis Park Person: Keya  Circulator: Kristy         Drains and/or Catheters:   Chest Tube 1 Right Pleural 24 Fr (Active)   Function -20 cm H2O 08/08/24 1445   Chest Tube Air Leak No 08/08/24 1445   Patency Intervention Tip/tilt 08/08/24 1445   Drainage Description Serosanguineous 08/08/24 1445   Dressing Status Clean;Dry 08/08/24 1445   Site Assessment Clean;Dry;Intact 08/08/24 1445   Surrounding Skin Intact 08/08/24 1445   Output (mL) 0 mL 08/08/24 1500       Tourniquet Times:         Implants:     Findings: Wedge resection with no evidence of malignancy lesion consistent with amyloid.    Indications: Bethany Cuenca is an 69 y.o. 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  History of amyloidosis of the lung here for lung resection of the lung nodule.    The patient was seen in the preoperative area. The risks, benefits, complications, treatment options, non-operative alternatives, expected recovery and outcomes were discussed with the patient. The possibilities of reaction to medication, pulmonary aspiration, injury to surrounding structures, bleeding, recurrent infection, the need for additional procedures, failure to diagnose a condition, and creating a complication requiring transfusion or operation were discussed with the patient. The  patient concurred with the proposed plan, giving informed consent.  The site of surgery was properly noted/marked if necessary per policy. The patient has been actively warmed in preoperative area. Preoperative antibiotics have been ordered and given within 1 hours of incision. Venous thrombosis prophylaxis have been ordered including bilateral sequential compression devices and chemical prophylaxis    Procedure Details: After identifying the patient in the preoperative area, the patient was brought to the room.  Patient placed in the supine position.  Timeout performed.  General anesthesia induced with a double-lumen tube.  Flexible bronchoscopy performed to assess position of endotracheal tube and also to assess the airway.  No endobronchial lesions were identified.  Patient placed in the left lateral decubitus with the right side up.  All bony prominences were padded.  Chest was prepped and draped in surgical fashion.  A 1 cm incision was performed in the eighth intercostal space with midaxillary line.  After gaining access to the chest cavity, multilevel rib blocks with Marcaine were done under direct visualization.  1 to 2 cc of quarter percent Marcaine were injected into each interspace from spaces 3-8.  Additional ports were placed in the same interspace 1 anterior and 2 posterior.  An assist port was placed between arms 2 and 3.  Robot was docked. I proceeded to scrub out and moved to the console.  I was able to identify the nodule in the right upper lobe.  Using a combination of black loads of robotic stapler proceeded to wedge of this lesion with good gross margins.  Specimen was removed from the chest in an Endo Catch bag.  This was sent for frozen section.  In the meanwhile, attention was turned to the inferior pulmonary ligament which was dissected and Station 9 lymph nodes were harvested and sent for permanent section.  The dissection was taken posteriorly to the level of the hilum and also the  subcarinal space lymph nodes were harvested and sent for permanent section.  Station 4R lymph nodes were harvested and sent for permanent section.  At this point frozen section was reported as negative for malignancy and consistent with amyloidosis. Hemostasis was achieved at all times.  24 Hebrew chest tube placed robot undocked.  Lung insufflated under direct visualization.  All wounds were closed in layers.  Patient tolerated procedure well and transferred to the postanesthesia care unit in adequate conditions.  All counts were correct.  No qualified resident or fellow to bedside assist during this robotic case so Ashley Godfrey acted as a first assistant during this robotic case.  Complications:  None; patient tolerated the procedure well.    Disposition: PACU - hemodynamically stable.  Condition: stable         Attending Attestation: I was present and scrubbed for the entire procedure.    Christiano Tubbs  Phone Number: [phone]

## 2024-08-08 NOTE — ANESTHESIA PROCEDURE NOTES
Airway  Date/Time: 8/8/2024 7:53 AM  Urgency: elective    Airway not difficult    Staffing  Performed: resident   Authorized by: Jose Terrazas MD    Performed by: Emery Esposito DO  Patient location during procedure: OR    Indications and Patient Condition  Indications for airway management: anesthesia  Spontaneous Ventilation: absent  Sedation level: deep  Preoxygenated: yes  Patient position: sniffing  Mask difficulty assessment: 1 - vent by mask  Planned trial extubation    Final Airway Details  Final airway type: endotracheal airway      Successful airway: ETT - double lumen left  Cuffed: yes   Successful intubation technique: direct laryngoscopy  Facilitating devices/methods: intubating stylet  Endotracheal tube insertion site: oral  Blade: Yovany  Blade size: #4  ETT DL size (fr): 37  Cormack-Lehane Classification: grade I - full view of glottis  Placement verified by: chest auscultation and capnometry   Measured from: lips  ETT to lips (cm): 23  Number of attempts at approach: 1    Additional Comments  Maria 3 used with 37 L RADHA, used bronchoscope as stylet

## 2024-08-08 NOTE — ANESTHESIA PREPROCEDURE EVALUATION
Patient: Bethany Cuenca    Procedure Information       Date/Time: 08/08/24 0715    Procedure: Resection Robot-Assisted Lung Wedge; Resection Robot-Assisted Lung Wedge [id number] (Right: Chest) - Robotic assisted right upper lobe wedge resection with mediastinal lymphadenectomy; ROBOT approved by Jennifer Ramirez.    Location: [facility] OR 14 / [facility] OR    Surgeons: Christiano Tubbs MD            Relevant Problems   Anesthesia (within normal limits)       Clinical information reviewed:   Tobacco  Allergies  Meds   Med Hx  Surg Hx  OB Status  Fam Hx  Soc   Hx        NPO Detail:  NPO/Void Status  Carbohydrate Drink Given Prior to Surgery? : N  Date of Last Liquid: 08/08/24  Time of Last Liquid: 0500  Date of Last Solid: 08/07/24  Time of Last Solid: 1700  Last Intake Type: Clear fluids  Time of Last Void: 0600         Physical Exam    Airway  Mallampati: II  TM distance: >3 FB     Cardiovascular   Rhythm: regular  Rate: normal     Dental    Pulmonary   Breath sounds clear to auscultation     Abdominal            Anesthesia Plan    History of general anesthesia?: yes  History of complications of general anesthesia?: no    ASA 2     general     The patient is not a current smoker.  Patient was previously instructed to abstain from smoking on day of procedure.  Patient did not smoke on day of procedure.    intravenous induction   Anesthetic plan and risks discussed with patient.  Use of blood products discussed with patient who.    Plan discussed with attending and resident.

## 2024-08-08 NOTE — ANESTHESIA PROCEDURE NOTES
Peripheral IV  Date/Time: 8/8/2024 7:45 AM      Placement  Needle size: 18 G  Laterality: right  Location: hand  Local anesthetic: none  Site prep: alcohol  Technique: anatomical landmarks  Attempts: 1

## 2024-08-08 NOTE — ANESTHESIA POSTPROCEDURE EVALUATION
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])    Surgeons: Christiano Tubbs MD Responsible Provider: Jose Terrazas MD    Anesthesia Type: general ASA Status: 2            Anesthesia Type: general    Vitals Value Taken Time   /64 08/08/24 0910   Temp 36.5 08/08/24 0910   Pulse 66 08/08/24 0910   Resp 16 08/08/24 0910   SpO2 100 08/08/24 0910       Anesthesia Post Evaluation    Patient location during evaluation: PACU  Patient participation: complete - patient participated  Level of consciousness: awake and alert  Pain score: 0  Pain management: adequate  Airway patency: patent  Cardiovascular status: stable  Respiratory status: spontaneous ventilation  Hydration status: acceptable  Postoperative Nausea and Vomiting: none        No notable events documented.

## 2024-08-08 NOTE — BRIEF OP NOTE
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]     Procedures  Bronchoscopy, right robotic assisted upper lobe wedge resection, mediastinal lymph node dissection, and intercostal nerve rib blocks     Surgeons      * Christiano Tubbs - Primary    Resident/Fellow/Other Assistant:  Surgeons and Role:     * Loan Dimas PA-C - MARYANN First Assist    Procedure Summary  Anesthesia: General  ASA: II  Anesthesia Staff: Anesthesiologist: Jose Terrazas MD  Anesthesia Resident: Emery Esposito DO  Estimated Blood Loss: 10mL  Intra-op Medications:   Administrations occurring from 0715 to 1030 on 24:   Medication Name Total Dose   sodium chloride 0.9 % irrigation solution 1,000 mL   BUPivacaine-EPINEPHrine (Marcaine w/EPI) 0.25 %-1:200,000 injection 30 mL   oxygen (O2) therapy 510 L   fentaNYL PF (Sublimaze) injection  - Omnicell Override Pull Cannot be calculated   propofol (Diprivan) injection  - Omnicell Override Pull Cannot be calculated   sugammadex (Bridion) injection  - Omnicell Override Pull Cannot be calculated          Anesthesia Record               Intraprocedure I/O Totals          Intake    Dexmedetomidine 0.00 mL    The total shown is the total volume documented since Anesthesia Start was filed.    LR bolus 400.00 mL    Total Intake 400 mL          Specimen:   ID Type Source Tests Collected by Time   1 : RIGHT UPPER LOBE WEDGE Tissue LUNG WEDGE RESECTION RIGHT (SPECIFY SITE) SURGICAL PATHOLOGY EXAM Christiano Tubbs MD 2024 0804   2 : LEVEL 9 LYMPH NODE Tissue LYMPH NODE PULMONARY (SPECIFY SITE) SURGICAL PATHOLOGY EXAM Christiano Tubbs MD 2024 0813   3 : LEVEL 10 LYMPH NODE Tissue LYMPH NODE PULMONARY (SPECIFY SITE) SURGICAL PATHOLOGY EXAM Christiano Walsh  MD Arley 8/8/2024 0822   4 : LEVEL 7 LYMPH NODE Tissue LYMPH NODE PULMONARY (SPECIFY SITE) SURGICAL PATHOLOGY EXAM Christiano Tubbs MD 8/8/2024 0822   5 : LEVEL 4R LYMPH NODE Tissue LYMPH NODE PULMONARY (SPECIFY SITE) SURGICAL PATHOLOGY EXAM Christiano Tubbs MD 8/8/2024 0828      Staff:   Cleveulator: Elis Park Person: Keya  Circulator: Kristy    Findings: Frozen pathology of the right upper lobe wedge resection consistent with amyloid cells. No malignant cells noted.     Complications:  None; patient tolerated the procedure well.     Disposition: PACU - hemodynamically stable.  Condition: stable  Specimens Collected:   ID Type Source Tests Collected by Time   1 : RIGHT UPPER LOBE WEDGE Tissue LUNG WEDGE RESECTION RIGHT (SPECIFY SITE) SURGICAL PATHOLOGY EXAM Christiano Tubbs MD 8/8/2024 0804   2 : LEVEL 9 LYMPH NODE Tissue LYMPH NODE PULMONARY (SPECIFY SITE) SURGICAL PATHOLOGY EXAM Christiano Tubbs MD 8/8/2024 0813   3 : LEVEL 10 LYMPH NODE Tissue LYMPH NODE PULMONARY (SPECIFY SITE) SURGICAL PATHOLOGY EXAM Christiano Tubbs MD 8/8/2024 0822   4 : LEVEL 7 LYMPH NODE Tissue LYMPH NODE PULMONARY (SPECIFY SITE) SURGICAL PATHOLOGY EXAM Christiano Tubbs MD 8/8/2024 0822   5 : LEVEL 4R LYMPH NODE Tissue LYMPH NODE PULMONARY (SPECIFY SITE) SURGICAL PATHOLOGY EXAM Christiano Tubbs MD 8/8/2024 0828     I was the bedside assistant in Dr. Walsh's robotic assisted right upper lobe wedge resection and mediastinal lymph node dissection.     Loan Dimsa PA-C

## 2024-08-08 NOTE — INTERVAL H&P NOTE
H&P reviewed. The patient was examined and there are no changes to the H&P.    No changes, no fever chills, N/V, chest pain, dyspnea..    ROS: negative except above    A/P: RUL nodule  To OR for wedge, robotic  To be admitted

## 2024-08-09 ENCOUNTER — APPOINTMENT (OUTPATIENT)
Dept: RADIOLOGY | Facility: HOSPITAL | Age: 69
End: 2024-08-09
Payer: MEDICARE

## 2024-08-09 VITALS
SYSTOLIC BLOOD PRESSURE: 109 MMHG | TEMPERATURE: 97.9 F | HEIGHT: 64 IN | WEIGHT: 156.9 LBS | HEART RATE: 73 BPM | DIASTOLIC BLOOD PRESSURE: 68 MMHG | RESPIRATION RATE: 17 BRPM | BODY MASS INDEX: 26.79 KG/M2 | OXYGEN SATURATION: 92 %

## 2024-08-09 LAB
ANION GAP SERPL CALC-SCNC: 9 MMOL/L (ref 10–20)
BUN SERPL-MCNC: 16 MG/DL (ref 6–23)
CALCIUM SERPL-MCNC: 8.2 MG/DL (ref 8.6–10.6)
CHLORIDE SERPL-SCNC: 102 MMOL/L (ref 98–107)
CO2 SERPL-SCNC: 28 MMOL/L (ref 21–32)
CREAT SERPL-MCNC: 0.83 MG/DL (ref 0.5–1.05)
EGFRCR SERPLBLD CKD-EPI 2021: 77 ML/MIN/1.73M*2
ERYTHROCYTE [DISTWIDTH] IN BLOOD BY AUTOMATED COUNT: 14.7 % (ref 11.5–14.5)
GLUCOSE SERPL-MCNC: 100 MG/DL (ref 74–99)
HCT VFR BLD AUTO: 33.3 % (ref 36–46)
HGB BLD-MCNC: 10.3 G/DL (ref 12–16)
MAGNESIUM SERPL-MCNC: 1.85 MG/DL (ref 1.6–2.4)
MCH RBC QN AUTO: 27.9 PG (ref 26–34)
MCHC RBC AUTO-ENTMCNC: 30.9 G/DL (ref 32–36)
MCV RBC AUTO: 90 FL (ref 80–100)
NRBC BLD-RTO: 0 /100 WBCS (ref 0–0)
PLATELET # BLD AUTO: 188 X10*3/UL (ref 150–450)
POTASSIUM SERPL-SCNC: 3.9 MMOL/L (ref 3.5–5.3)
RBC # BLD AUTO: 3.69 X10*6/UL (ref 4–5.2)
SODIUM SERPL-SCNC: 135 MMOL/L (ref 136–145)
WBC # BLD AUTO: 6.6 X10*3/UL (ref 4.4–11.3)

## 2024-08-09 PROCEDURE — 83735 ASSAY OF MAGNESIUM: CPT | Performed by: PHYSICIAN ASSISTANT

## 2024-08-09 PROCEDURE — 2500000004 HC RX 250 GENERAL PHARMACY W/ HCPCS (ALT 636 FOR OP/ED): Performed by: PHYSICIAN ASSISTANT

## 2024-08-09 PROCEDURE — 2500000001 HC RX 250 WO HCPCS SELF ADMINISTERED DRUGS (ALT 637 FOR MEDICARE OP): Performed by: PHYSICIAN ASSISTANT

## 2024-08-09 PROCEDURE — 71045 X-RAY EXAM CHEST 1 VIEW: CPT | Performed by: RADIOLOGY

## 2024-08-09 PROCEDURE — 80048 BASIC METABOLIC PNL TOTAL CA: CPT | Performed by: PHYSICIAN ASSISTANT

## 2024-08-09 PROCEDURE — 71045 X-RAY EXAM CHEST 1 VIEW: CPT

## 2024-08-09 PROCEDURE — 99024 POSTOP FOLLOW-UP VISIT: CPT | Performed by: PHYSICIAN ASSISTANT

## 2024-08-09 PROCEDURE — 36415 COLL VENOUS BLD VENIPUNCTURE: CPT | Performed by: PHYSICIAN ASSISTANT

## 2024-08-09 PROCEDURE — 85027 COMPLETE CBC AUTOMATED: CPT | Performed by: PHYSICIAN ASSISTANT

## 2024-08-09 RX ORDER — TRAMADOL HYDROCHLORIDE 50 MG/1
50 TABLET ORAL EVERY 6 HOURS PRN
Qty: 20 TABLET | Refills: 0 | Status: SHIPPED | OUTPATIENT
Start: 2024-08-09 | End: 2024-08-16

## 2024-08-09 RX ORDER — TRAMADOL HYDROCHLORIDE 50 MG/1
50 TABLET ORAL EVERY 6 HOURS PRN
Status: DISCONTINUED | OUTPATIENT
Start: 2024-08-09 | End: 2024-08-09 | Stop reason: HOSPADM

## 2024-08-09 ASSESSMENT — PAIN SCALES - GENERAL
PAINLEVEL_OUTOF10: 2
PAINLEVEL_OUTOF10: 4
PAINLEVEL_OUTOF10: 2

## 2024-08-09 ASSESSMENT — COGNITIVE AND FUNCTIONAL STATUS - GENERAL
STANDING UP FROM CHAIR USING ARMS: A LITTLE
DAILY ACTIVITIY SCORE: 21
DRESSING REGULAR UPPER BODY CLOTHING: A LITTLE
TOILETING: A LITTLE
MOVING TO AND FROM BED TO CHAIR: A LITTLE
CLIMB 3 TO 5 STEPS WITH RAILING: A LITTLE
HELP NEEDED FOR BATHING: A LITTLE
MOBILITY SCORE: 20
WALKING IN HOSPITAL ROOM: A LITTLE

## 2024-08-09 ASSESSMENT — PAIN - FUNCTIONAL ASSESSMENT
PAIN_FUNCTIONAL_ASSESSMENT: 0-10
PAIN_FUNCTIONAL_ASSESSMENT: 0-10

## 2024-08-09 ASSESSMENT — PAIN DESCRIPTION - ORIENTATION: ORIENTATION: RIGHT

## 2024-08-09 ASSESSMENT — PAIN DESCRIPTION - LOCATION: LOCATION: RIB CAGE

## 2024-08-09 NOTE — PROGRESS NOTES
Met with patient and introduced myself as Care Coordinator and member of the discharge planning team.  Pt is s/p r UL wedge resection, LND. She plans to return home at time of discharge with her . She is independent in ADL's. Her PCP is Dr. Tomlin. She uses [facility] in [place]. No home care needs were identified at this time. Care coordinator will continue to follow for home going needs.  Christine Ambriz RN

## 2024-08-09 NOTE — CARE PLAN
The patient's goals for the shift include      The clinical goals for the shift include Pt will report controlled pain throughout shift.      Problem: Fall/Injury  Goal: Not fall by end of shift  Outcome: Progressing  Goal: Be free from injury by end of the shift  Outcome: Progressing  Goal: Verbalize understanding of personal risk factors for fall in the hospital  Outcome: Progressing  Goal: Verbalize understanding of risk factor reduction measures to prevent injury from fall in the home  Outcome: Progressing  Goal: Use assistive devices by end of the shift  Outcome: Progressing  Goal: Pace activities to prevent fatigue by end of the shift  Outcome: Progressing     Problem: Pain - Adult  Goal: Verbalizes/displays adequate comfort level or baseline comfort level  Outcome: Progressing     Problem: Safety - Adult  Goal: Free from fall injury  Outcome: Progressing     Problem: Discharge Planning  Goal: Discharge to home or other facility with appropriate resources  Outcome: Progressing     Problem: Chronic Conditions and Co-morbidities  Goal: Patient's chronic conditions and co-morbidity symptoms are monitored and maintained or improved  Outcome: Progressing     Problem: Respiratory  Goal: Clear secretions with interventions this shift  Outcome: Progressing  Goal: Minimize anxiety/maximize coping throughout shift  Outcome: Progressing  Goal: Minimal/no exertional discomfort or dyspnea this shift  Outcome: Progressing  Goal: No signs of respiratory distress (eg. Use of accessory muscles. Peds grunting)  Outcome: Progressing  Goal: Patent airway maintained this shift  Outcome: Progressing  Goal: Tolerate mechanical ventilation evidenced by VS/agitation level this shift  Outcome: Progressing  Goal: Tolerate pulmonary toileting this shift  Outcome: Progressing  Goal: Verbalize decreased shortness of breath this shift  Outcome: Progressing  Goal: Wean oxygen to maintain O2 saturation per order/standard this shift  Outcome:  Progressing  Goal: Increase self care and/or family involvement in next 24 hours  Outcome: Progressing

## 2024-08-09 NOTE — CARE PLAN
The patient's goals for the shift include rest and pain control      The clinical goals for the shift include patient will remain hemodynamically stable throughout shift      Problem: Fall/Injury  Goal: Not fall by end of shift  Outcome: Progressing  Goal: Be free from injury by end of the shift  Outcome: Progressing  Goal: Verbalize understanding of personal risk factors for fall in the hospital  Outcome: Progressing  Goal: Verbalize understanding of risk factor reduction measures to prevent injury from fall in the home  Outcome: Progressing  Goal: Use assistive devices by end of the shift  Outcome: Progressing  Goal: Pace activities to prevent fatigue by end of the shift  Outcome: Progressing     Problem: Pain - Adult  Goal: Verbalizes/displays adequate comfort level or baseline comfort level  Outcome: Progressing     Problem: Safety - Adult  Goal: Free from fall injury  Outcome: Progressing     Problem: Discharge Planning  Goal: Discharge to home or other facility with appropriate resources  Outcome: Progressing     Problem: Chronic Conditions and Co-morbidities  Goal: Patient's chronic conditions and co-morbidity symptoms are monitored and maintained or improved  Outcome: Progressing     Problem: Respiratory  Goal: Clear secretions with interventions this shift  Outcome: Progressing  Goal: Minimize anxiety/maximize coping throughout shift  Outcome: Progressing  Goal: Minimal/no exertional discomfort or dyspnea this shift  Outcome: Progressing  Goal: No signs of respiratory distress (eg. Use of accessory muscles. Peds grunting)  Outcome: Progressing  Goal: Patent airway maintained this shift  Outcome: Progressing  Goal: Tolerate mechanical ventilation evidenced by VS/agitation level this shift  Outcome: Progressing  Goal: Tolerate pulmonary toileting this shift  Outcome: Progressing  Goal: Verbalize decreased shortness of breath this shift  Outcome: Progressing  Goal: Wean oxygen to maintain O2 saturation per  order/standard this shift  Outcome: Progressing  Goal: Increase self care and/or family involvement in next 24 hours  Outcome: Progressing

## 2024-08-09 NOTE — DISCHARGE SUMMARY
Discharge Diagnosis  Amyloidosis of lung (Multi)    Issues Requiring Follow-Up  Pathology; surgical incisions    Test Results Pending At Discharge  Pending Labs       Order Current Status    Surgical Pathology Exam In process            Hospital Course  Pt underwent a right robotic upper lobe wedge on 8/8/2024 with Dr Walsh.  Her post-op course was uneventful.  The chest tube was removed on POD#1.  On POD#1, the patient was deemed stable for discharge to home.  The pt was given instructions on her physical limitations and restrictions.  The patient was given instructions on follow-up appointments and discharge medications.  The patient was stable at the time of discharge.        Pertinent Physical Exam At Time of Discharge  Physical Exam  HEENT: Normocephalic and atraumatic.   NECK: Supple. Trach midline. No JVD.   CHEST: Breathing comfortably on room air.  Lungs CTA B.  Chest tube with 180ml drainage since the OR; no air leak appreciated  HEART: Regular rate and rhythm. NSR with rate in 70s per tele review.   ABDOMEN: Soft, ND, nontender, +BS.   NEUROLOGIC: Alert and oriented. Grossly intact.   EXTREMITIES: Moves all extremities equally.  No lower extremity edema. No calf tenderness.      Imaging:  CXR reviewed from this morning; post-pull CXR remains stable in comparison with this morning's CXR    Home Medications     Medication List      START taking these medications     traMADol 50 mg tablet; Commonly known as: Ultram; Take 1 tablet (50 mg)   by mouth every 6 hours if needed for moderate pain (4 - 6) for up to 7   days.     CONTINUE taking these medications     cholecalciferol 25 MCG (1000 UT) capsule; Commonly known as: Vitamin D-3   ferrous sulfate 325 (65 Fe) MG EC tablet; Take 1 tablet by mouth every   other day. Do not crush, chew, or split.   venlafaxine 25 mg tablet; Commonly known as: Effexor       Outpatient Follow-Up  Future Appointments   Date Time Provider Department Center   8/23/2024 11:00 AM  [id number]       Elder Coates PA-C

## 2024-08-19 DIAGNOSIS — R91.1 LUNG NODULE: Primary | ICD-10-CM

## 2024-08-20 LAB
LAB AP ASR DISCLAIMER: NORMAL
LABORATORY COMMENT REPORT: NORMAL
Lab: NORMAL
PATH REPORT.FINAL DX SPEC: NORMAL
PATH REPORT.GROSS SPEC: NORMAL
PATH REPORT.RELEVANT HX SPEC: NORMAL
PATH REPORT.TOTAL CANCER: NORMAL

## 2024-08-23 ENCOUNTER — OFFICE VISIT (OUTPATIENT)
Dept: SURGERY | Facility: HOSPITAL | Age: 69
End: 2024-08-23
Payer: MEDICARE

## 2024-08-23 ENCOUNTER — HOSPITAL ENCOUNTER (OUTPATIENT)
Dept: RADIOLOGY | Facility: HOSPITAL | Age: 69
Discharge: HOME | End: 2024-08-23
Payer: MEDICARE

## 2024-08-23 VITALS
DIASTOLIC BLOOD PRESSURE: 67 MMHG | RESPIRATION RATE: 18 BRPM | BODY MASS INDEX: 27.35 KG/M2 | WEIGHT: 159.39 LBS | HEART RATE: 68 BPM | OXYGEN SATURATION: 99 % | TEMPERATURE: 96.8 F | SYSTOLIC BLOOD PRESSURE: 139 MMHG

## 2024-08-23 DIAGNOSIS — J99 AMYLOIDOSIS OF LUNG (MULTI): Primary | ICD-10-CM

## 2024-08-23 DIAGNOSIS — E85.4 AMYLOIDOSIS OF LUNG (MULTI): Primary | ICD-10-CM

## 2024-08-23 DIAGNOSIS — R91.1 LUNG NODULE: ICD-10-CM

## 2024-08-23 PROCEDURE — 1159F MED LIST DOCD IN RCRD: CPT | Performed by: THORACIC SURGERY (CARDIOTHORACIC VASCULAR SURGERY)

## 2024-08-23 PROCEDURE — 99211 OFF/OP EST MAY X REQ PHY/QHP: CPT | Performed by: THORACIC SURGERY (CARDIOTHORACIC VASCULAR SURGERY)

## 2024-08-23 PROCEDURE — 1125F AMNT PAIN NOTED PAIN PRSNT: CPT | Performed by: THORACIC SURGERY (CARDIOTHORACIC VASCULAR SURGERY)

## 2024-08-23 PROCEDURE — 71046 X-RAY EXAM CHEST 2 VIEWS: CPT

## 2024-08-23 PROCEDURE — 1111F DSCHRG MED/CURRENT MED MERGE: CPT | Performed by: THORACIC SURGERY (CARDIOTHORACIC VASCULAR SURGERY)

## 2024-08-23 ASSESSMENT — ENCOUNTER SYMPTOMS
HEMATOLOGIC/LYMPHATIC NEGATIVE: 1
EYES NEGATIVE: 1
CHEST TIGHTNESS: 0
PALPITATIONS: 0
SHORTNESS OF BREATH: 0
DIARRHEA: 0
CHILLS: 0
MUSCULOSKELETAL NEGATIVE: 1
UNEXPECTED WEIGHT CHANGE: 0
FATIGUE: 0
WHEEZING: 0
APPETITE CHANGE: 0
ALLERGIC/IMMUNOLOGIC NEGATIVE: 1
NAUSEA: 0
VOMITING: 0
CHOKING: 0
STRIDOR: 0
FEVER: 0
ENDOCRINE NEGATIVE: 1
DIAPHORESIS: 0
ABDOMINAL PAIN: 0
NEUROLOGICAL NEGATIVE: 1
COUGH: 0
ABDOMINAL DISTENTION: 0
PSYCHIATRIC NEGATIVE: 1
CONSTIPATION: 0

## 2024-08-23 ASSESSMENT — PAIN SCALES - GENERAL: PAINLEVEL: 7

## 2024-09-12 ENCOUNTER — APPOINTMENT (OUTPATIENT)
Dept: HEMATOLOGY/ONCOLOGY | Facility: HOSPITAL | Age: 69
End: 2024-09-12
Payer: MEDICARE

## 2024-11-13 ENCOUNTER — OFFICE VISIT (OUTPATIENT)
Dept: ORTHOPEDIC SURGERY | Age: 69
End: 2024-11-13

## 2024-11-13 VITALS
OXYGEN SATURATION: 98 % | TEMPERATURE: 98.2 F | HEART RATE: 74 BPM | DIASTOLIC BLOOD PRESSURE: 71 MMHG | SYSTOLIC BLOOD PRESSURE: 115 MMHG

## 2024-11-13 DIAGNOSIS — Z96.642 S/P TOTAL LEFT HIP ARTHROPLASTY: Primary | ICD-10-CM

## 2024-11-13 RX ORDER — ALBUTEROL SULFATE 90 UG/1
INHALANT RESPIRATORY (INHALATION)
COMMUNITY
Start: 2024-10-08

## 2024-11-13 RX ORDER — BUDESONIDE AND FORMOTEROL FUMARATE 160; 4.5 UG/1; UG/1
2 AEROSOL, METERED RESPIRATORY (INHALATION)
COMMUNITY
Start: 2024-10-08

## 2024-11-13 NOTE — PROGRESS NOTES
Follow Up Post Operative Visit     Surgery: Left total hip arthroplasty  Date: 7/31/2023    Subjective:    Ginger Ferrari is here for follow up visit s/p above procedure.  She is doing very well.  She is ambulating normally and is very happy with her results.  Her only pain she experiences when she tries to lay on her left hip when she is sleeping.    She did recently have a partial lobectomy for her amyloidosis and has recovered well.  This was performed in [place].        Controlled Substances Monitoring:        Physical Exam:    BP: 115/71    General: Alert and oriented x3, no acute distress  Cardiovascular/pulmonary: No labored breathing, peripheral perfusion intact  Musculoskeletal:    Left hip: She is ambulating normally with no antalgia and no assistive device.  She has no pain with range of motion of her hip.  On standing single-leg testing she has no Trendelenburg sign.  Her antalgic gait is resolved.  She has full hip range of motion without pain.  Thigh soft compressible.  Knee range of motion is full with 5 out of 5 strength.   Ankle plantar dorsiflexion intact      Imaging: AP pelvis and multiple views of the left hip demonstrate well-placed total hip arthroplasty without signs of complication.  Images independently interpreted by myself    Assessment and Plan: 1.5 years status post left total hip arthroplasty  -She is very happy with the results.  We talked about dental prophylaxis with antibiotics.  No restrictions.  All of her questions answered to her satisfaction.  She can follow-up with me on an as-needed basis            Antony Vickers DO   Orthopaedic Surgery   11/13/24  10:09 AM    Note: This report was completed using Highlighter voiced recognition software.  Every effort has been made to ensure accuracy; however, inadvertent computerized transcription errors may be present.

## 2024-11-15 ENCOUNTER — TELEMEDICINE (OUTPATIENT)
Dept: RHEUMATOLOGY | Age: 69
End: 2024-11-15

## 2024-11-15 DIAGNOSIS — E85.89 OTHER AMYLOIDOSIS (HCC): ICD-10-CM

## 2024-11-15 DIAGNOSIS — M35.01 SJOGREN'S SYNDROME WITH KERATOCONJUNCTIVITIS SICCA (HCC): Primary | Chronic | ICD-10-CM

## 2024-11-15 ASSESSMENT — ENCOUNTER SYMPTOMS
DIARRHEA: 0
TROUBLE SWALLOWING: 0
COLOR CHANGE: 0
SHORTNESS OF BREATH: 0
COUGH: 0
NAUSEA: 0
ABDOMINAL PAIN: 0
VOMITING: 0

## 2024-11-15 NOTE — PROGRESS NOTES
Ginger Ferrari, was evaluated through a synchronous (real-time) audio-video encounter. The patient (or guardian if applicable) is aware that this is a billable service, which includes applicable co-pays. This Virtual Visit was conducted with patient's (and/or legal guardian's) consent. Patient identification was verified, and a caregiver was present when appropriate.   The patient was located at Home: [address] [place] [address]  Provider was located at Home (Appt Dept State): [place]  Confirm you are appropriately licensed, registered, or certified to deliver care in the state where the patient is located as indicated above. If you are not or unsure, please re-schedule the visit: Yes, I confirm.      Total time spent for this encounter: Not billed by time    --Juarez Pinon DO on 11/15/2024 at 9:43 AM    An electronic signature was used to authenticate this note.     Ginger Ferrari 1955 is a 69 y.o. female, here for evaluation of the following chief complaint(s):  Follow-up (Patient present for a  VV today for follow up on sjogrens syndrome. Patient states that she feels about the same since last visit, no new concerns or issues noted. )      Assessment & Plan   ASSESSMENT/PLAN:    Ginger Ferrari 1955 is a 69 y.o. female seen in follow-up for Sjogren's syndrome and  amyloidosis.    1.   Sjogren's syndrome-manifest as positive SSA, SSB, low positive rheumatoid factor, sicca symptoms. Unfortunately she has not seen much benefit from cevimeline, Salagen, or xerostomia spray.  She does not have any obvious extraglandular manifestations at this point.  Will update labs as below.    2.   Amyloidosis-patient was incidentally found to have a lung nodule.  She had biopsy done and pathology was most consistent with amyloidosis.  Clinically, other than dry eyes and dry mouth from Sjogren's and some mild arthralgias from OA she really has no other complaints.  Amyloidosis sometimes can be

## 2024-12-02 ENCOUNTER — OFFICE VISIT (OUTPATIENT)
Dept: PAIN MANAGEMENT | Age: 69
End: 2024-12-02
Payer: MEDICARE

## 2024-12-02 ENCOUNTER — PREP FOR PROCEDURE (OUTPATIENT)
Dept: PAIN MANAGEMENT | Age: 69
End: 2024-12-02

## 2024-12-02 VITALS
RESPIRATION RATE: 16 BRPM | WEIGHT: 153 LBS | OXYGEN SATURATION: 96 % | SYSTOLIC BLOOD PRESSURE: 135 MMHG | HEART RATE: 80 BPM | BODY MASS INDEX: 27.11 KG/M2 | TEMPERATURE: 98.5 F | DIASTOLIC BLOOD PRESSURE: 82 MMHG | HEIGHT: 63 IN

## 2024-12-02 DIAGNOSIS — M19.012 OSTEOARTHRITIS OF LEFT SHOULDER, UNSPECIFIED OSTEOARTHRITIS TYPE: ICD-10-CM

## 2024-12-02 DIAGNOSIS — S43.402S SPRAIN OF LEFT SHOULDER, UNSPECIFIED SHOULDER SPRAIN TYPE, SEQUELA: ICD-10-CM

## 2024-12-02 DIAGNOSIS — M54.12 CERVICAL RADICULAR PAIN: Primary | ICD-10-CM

## 2024-12-02 DIAGNOSIS — M50.30 DDD (DEGENERATIVE DISC DISEASE), CERVICAL: Primary | ICD-10-CM

## 2024-12-02 DIAGNOSIS — M54.12 CERVICAL RADICULAR PAIN: ICD-10-CM

## 2024-12-02 PROCEDURE — G8484 FLU IMMUNIZE NO ADMIN: HCPCS | Performed by: ANESTHESIOLOGY

## 2024-12-02 PROCEDURE — G8417 CALC BMI ABV UP PARAM F/U: HCPCS | Performed by: ANESTHESIOLOGY

## 2024-12-02 PROCEDURE — G8399 PT W/DXA RESULTS DOCUMENT: HCPCS | Performed by: ANESTHESIOLOGY

## 2024-12-02 PROCEDURE — G8427 DOCREV CUR MEDS BY ELIG CLIN: HCPCS | Performed by: ANESTHESIOLOGY

## 2024-12-02 PROCEDURE — 1090F PRES/ABSN URINE INCON ASSESS: CPT | Performed by: ANESTHESIOLOGY

## 2024-12-02 PROCEDURE — 1123F ACP DISCUSS/DSCN MKR DOCD: CPT | Performed by: ANESTHESIOLOGY

## 2024-12-02 PROCEDURE — 99204 OFFICE O/P NEW MOD 45 MIN: CPT | Performed by: ANESTHESIOLOGY

## 2024-12-02 PROCEDURE — 1036F TOBACCO NON-USER: CPT | Performed by: ANESTHESIOLOGY

## 2024-12-02 PROCEDURE — 1159F MED LIST DOCD IN RCRD: CPT | Performed by: ANESTHESIOLOGY

## 2024-12-02 PROCEDURE — 3017F COLORECTAL CA SCREEN DOC REV: CPT | Performed by: ANESTHESIOLOGY

## 2024-12-02 RX ORDER — HYDROCODONE BITARTRATE AND ACETAMINOPHEN 5; 325 MG/1; MG/1
1 TABLET ORAL 2 TIMES DAILY PRN
Qty: 14 TABLET | Refills: 0 | Status: SHIPPED | OUTPATIENT
Start: 2024-12-02 | End: 2024-12-09

## 2024-12-02 RX ORDER — SODIUM CHLORIDE 0.9 % (FLUSH) 0.9 %
5-40 SYRINGE (ML) INJECTION EVERY 12 HOURS SCHEDULED
Status: CANCELLED | OUTPATIENT
Start: 2024-12-02

## 2024-12-02 RX ORDER — SODIUM CHLORIDE 0.9 % (FLUSH) 0.9 %
5-40 SYRINGE (ML) INJECTION PRN
Status: CANCELLED | OUTPATIENT
Start: 2024-12-02

## 2024-12-02 RX ORDER — SODIUM CHLORIDE 9 MG/ML
INJECTION, SOLUTION INTRAVENOUS PRN
Status: CANCELLED | OUTPATIENT
Start: 2024-12-02

## 2024-12-02 RX ORDER — METHYLPREDNISOLONE 4 MG/1
TABLET ORAL
Qty: 1 KIT | Refills: 0 | Status: SHIPPED | OUTPATIENT
Start: 2024-12-02 | End: 2024-12-08

## 2024-12-02 NOTE — H&P (VIEW-ONLY)
per Session: 0 min   Stress: Not on file   Social Connections: Not on file   Intimate Partner Violence: Not At Risk (11/8/2022)    Humiliation, Afraid, Rape, and Kick questionnaire     Fear of Current or Ex-Partner: No     Emotionally Abused: No     Physically Abused: No     Sexually Abused: No   Housing Stability: Low Risk  (8/8/2024)    Received from [facility]    Housing Stability Vital Sign     Unable to Pay for Housing in the Last Year: No     Number of Times Moved in the Last Year: 1     Homeless in the Last Year: No       Family History   Problem Relation Age of Onset    Liver Disease Mother     Cancer Father         lung liver cancer major smoker    Cancer Brother         pancreatic cancer       REVIEW OF SYSTEMS:     Patient specifically denies fever/chills, chest pain, shortness of breath, new bowel or bladder complaints.  All other review of systems was negative.  Review of Systems - Documented reviewed.    PHYSICAL EXAMINATION:      /82   Pulse 80   Temp 98.5 °F (36.9 °C)   Resp 16   Ht 1.6 m (5' 3\")   Wt 69.4 kg (153 lb)   SpO2 96%   BMI 27.10 kg/m²     General:      General appearance:  Pleasant and well-hydrated, in no distress and A & O x 3  Build:Normal Weight  Function: Rises from seated position easily and Moves about room without difficulty    HEENT:    Head:normocephalic, atraumatic  Pupils:regular, round, equal  Sclera: icterus absent    Lungs:    Breathing:normal breathing pattern     CVS:     RRR    Abdomen:    Shape:non-distended and normal  Tenderness:none  Guarding:none    Cervical spine:    Inspection:normal  Palpation:tenderness paravertebral muscles, tenderness trapezium, left, right and positive.  Range of motion:reduced flexion, extension, rotation bilaterally and is painful.  Spurling's: negative bilaterally    Thoracic spine:     Spine inspection:normal   Palpation:No tenderness over the midline and paraspinal area, bilaterally  Range of

## 2024-12-02 NOTE — PROGRESS NOTES
[facility]        [address] [place] [id number]  Dept: [phone]          Consult Note      Patient:  Ginger Ferrari,  1955    Date of Service:  24     Requesting Physician:  Juarez Arboelda DC    Reason for Consult:      Patient presents with complaints of neck pain      HISTORY OF PRESENT ILLNESS:      Ms. Ginger Ferrari is a 69 y.o. female presented today to [facility] for evaluation of neck pain and left UE pain following a fall abut a month ago.    Also has left shoulder pain.    Pain is constant and is described as aching, sharp, and stabbing.     Pain does radiate to left arm. She  has numbness, tingling, weakness of the left UE     Patient does not have bladder or bowel dysfunction.    Alleviating factors include: rest. Aggravating factors include: movement, bending, lifting.     Pain causes functional limitations/ limits Adl's : Yes    Nursing notes and details of the pain history reviewed. Please see intake notes for details.    Has been evaluated by Dr. Nkechi DC - treated conservatively.    Had MRI of C spine and MRI of Left shoulder at [facility].    She had left shoulder injection and TPI by her primary team recently.    Previous treatments:   Physical Therapy/Chiropractic treatment: yes.    Medications: - NSAID's : yes             - Membrane stabilizers : no            - Opioids : no            - Adjuvants or Others : yes    Spine Surgeries: no    She has not been on anticoagulation medications     Employment: retired.    Imaging:   MRI cervical spine 2024: [facility].  FINDINGS:  POSTERIOR FOSSA CONTENTS: Visualized posterior fossa contents, brain stem and skull base structures are unremarkable.    VERTEBRAL BODIES: The vertebral bodies are normal in height, marrow signal, and alignment.There is straightening of the normal cervical lordosis. This is likely positional in nature related to spasm    INTERVERTEBRAL

## 2024-12-02 NOTE — PROGRESS NOTES
Ginger Ferrari presents to the [facility] on 12/2/2024. Ginger is complaining of pain in neck and left shoulder from fall down steps about 1 month ago. The pain is constant. The pain is described as aching, throbbing, and numb. And swollen Pain is rated on her best day at a 8, on her worst day at a 8, and on average at a 8 on the VAS scale. She took her last dose of Motrin and Tylenol unk.     Any procedures since your last visit: No  Pacemaker or defibrillator: No managed by na.    She is  on NSAIDS and is not on anticoagulation medications to include none and is managed by na.     Do you want someone present when the provider examines you? No    Medication Contract and Consent for Opioid Use Documents Filed        No documents found                    /82   Pulse 80   Temp 98.5 °F (36.9 °C)   Resp 16   Ht 1.6 m (5' 3\")   Wt 69.4 kg (153 lb)   SpO2 96%   BMI 27.10 kg/m²      No LMP recorded. Patient is postmenopausal.

## 2024-12-09 ENCOUNTER — TELEPHONE (OUTPATIENT)
Dept: PAIN MANAGEMENT | Age: 69
End: 2024-12-09

## 2024-12-09 NOTE — TELEPHONE ENCOUNTER
Ginger L Vega called in and stated that she finished a Medrol Dose Pack on Saturday, she feels pain and burning in her left shoulder and bilateral wrist and hands.  She said she is taking Norco, 1/2 tab prn and it is not effective.  She asked if she can have another Medrol Dose pack or something different to control her pain.  Please advise.

## 2024-12-11 ENCOUNTER — TELEPHONE (OUTPATIENT)
Dept: PAIN MANAGEMENT | Age: 69
End: 2024-12-11

## 2024-12-11 DIAGNOSIS — M54.12 CERVICAL RADICULOPATHY: ICD-10-CM

## 2024-12-11 RX ORDER — GABAPENTIN 300 MG/1
CAPSULE ORAL
Qty: 90 CAPSULE | Refills: 0 | Status: SHIPPED | OUTPATIENT
Start: 2024-12-11 | End: 2025-01-10

## 2024-12-11 NOTE — TELEPHONE ENCOUNTER
Call to Ginger Ferrari that procedure was scheduled for 12/19/2024 and that [facility] should call her a few days before for the pre op call and between 2:00 PM and 4:00 PM  the business day before with the arrival time. Instructed Ginger to hold ibuprofen for 24 hours, Celebrex, Mobic, and naprosyn for 4 days and any aspirin containing products, CoQ 10, or fish oil for 7 days. Instructed to call office back if any questions. Ginger verbalized understanding.    Electronically signed by Maritza Samson RN on 12/11/2024 at 5:28 PM

## 2024-12-16 RX ORDER — VENLAFAXINE HYDROCHLORIDE 37.5 MG/1
37.5 CAPSULE, EXTENDED RELEASE ORAL DAILY
COMMUNITY

## 2024-12-16 NOTE — PROGRESS NOTES
[facility] PAIN MANAGEMENT  INSTRUCTIONS  ...........................................................................................................................................     [x] Parking the day of Surgery is located in the Main Entrance lot.  Upon entering the door, make immediate right into the surgery reception room    [x]  Bring photo ID and insurance card     [x] You may have a light breakfast day of procedure    [x]  Wear loose comfortable clothing    [x]  Please follow instructions for medications as given per Dr's office    [x] You can expect a call the business day prior to procedure to notify you of your arrival time     [x] Please arrange for     []  Other instructions

## 2024-12-19 ENCOUNTER — HOSPITAL ENCOUNTER (OUTPATIENT)
Age: 69
Setting detail: OUTPATIENT SURGERY
Discharge: HOME | End: 2024-12-19
Attending: ANESTHESIOLOGY | Admitting: ANESTHESIOLOGY
Payer: MEDICARE

## 2024-12-19 ENCOUNTER — HOSPITAL ENCOUNTER (OUTPATIENT)
Dept: GENERAL RADIOLOGY | Age: 69
Setting detail: OUTPATIENT SURGERY
Discharge: HOME | End: 2024-12-21
Attending: ANESTHESIOLOGY
Payer: MEDICARE

## 2024-12-19 VITALS
WEIGHT: 155 LBS | DIASTOLIC BLOOD PRESSURE: 67 MMHG | HEART RATE: 68 BPM | HEIGHT: 64 IN | SYSTOLIC BLOOD PRESSURE: 131 MMHG | BODY MASS INDEX: 26.46 KG/M2 | RESPIRATION RATE: 18 BRPM | OXYGEN SATURATION: 98 %

## 2024-12-19 DIAGNOSIS — R52 PAIN MANAGEMENT: ICD-10-CM

## 2024-12-19 PROCEDURE — 2709999900 HC NON-CHARGEABLE SUPPLY: Performed by: ANESTHESIOLOGY

## 2024-12-19 PROCEDURE — 3600000012 HC SURGERY LEVEL 2 ADDTL 15MIN: Performed by: ANESTHESIOLOGY

## 2024-12-19 PROCEDURE — 6360000002 HC RX W HCPCS: Performed by: ANESTHESIOLOGY

## 2024-12-19 PROCEDURE — 7100000010 HC PHASE II RECOVERY - FIRST 15 MIN: Performed by: ANESTHESIOLOGY

## 2024-12-19 PROCEDURE — 3600000002 HC SURGERY LEVEL 2 BASE: Performed by: ANESTHESIOLOGY

## 2024-12-19 PROCEDURE — 6360000004 HC RX CONTRAST MEDICATION: Performed by: ANESTHESIOLOGY

## 2024-12-19 PROCEDURE — 7100000011 HC PHASE II RECOVERY - ADDTL 15 MIN: Performed by: ANESTHESIOLOGY

## 2024-12-19 PROCEDURE — 2580000003 HC RX 258: Performed by: ANESTHESIOLOGY

## 2024-12-19 RX ORDER — SODIUM CHLORIDE 0.9 % (FLUSH) 0.9 %
5-40 SYRINGE (ML) INJECTION EVERY 12 HOURS SCHEDULED
Status: DISCONTINUED | OUTPATIENT
Start: 2024-12-19 | End: 2024-12-19 | Stop reason: HOSPADM

## 2024-12-19 RX ORDER — SODIUM CHLORIDE 0.9 % (FLUSH) 0.9 %
5-40 SYRINGE (ML) INJECTION PRN
Status: DISCONTINUED | OUTPATIENT
Start: 2024-12-19 | End: 2024-12-19 | Stop reason: HOSPADM

## 2024-12-19 RX ORDER — SODIUM CHLORIDE 9 MG/ML
INJECTION, SOLUTION INTRAVENOUS PRN
Status: DISCONTINUED | OUTPATIENT
Start: 2024-12-19 | End: 2024-12-19 | Stop reason: HOSPADM

## 2024-12-19 RX ORDER — IOPAMIDOL 612 MG/ML
INJECTION, SOLUTION INTRATHECAL PRN
Status: DISCONTINUED | OUTPATIENT
Start: 2024-12-19 | End: 2024-12-19 | Stop reason: ALTCHOICE

## 2024-12-19 RX ORDER — LIDOCAINE HYDROCHLORIDE 5 MG/ML
INJECTION, SOLUTION INFILTRATION; INTRAVENOUS PRN
Status: DISCONTINUED | OUTPATIENT
Start: 2024-12-19 | End: 2024-12-19 | Stop reason: ALTCHOICE

## 2024-12-19 RX ORDER — DEXAMETHASONE SODIUM PHOSPHATE 10 MG/ML
INJECTION, SOLUTION INTRAMUSCULAR; INTRAVENOUS PRN
Status: DISCONTINUED | OUTPATIENT
Start: 2024-12-19 | End: 2024-12-19 | Stop reason: ALTCHOICE

## 2024-12-19 RX ORDER — SODIUM CHLORIDE 9 MG/ML
INJECTION, SOLUTION INTRAMUSCULAR; INTRAVENOUS; SUBCUTANEOUS PRN
Status: DISCONTINUED | OUTPATIENT
Start: 2024-12-19 | End: 2024-12-19 | Stop reason: ALTCHOICE

## 2024-12-19 ASSESSMENT — PAIN DESCRIPTION - PAIN TYPE: TYPE: CHRONIC PAIN

## 2024-12-19 ASSESSMENT — PAIN DESCRIPTION - LOCATION: LOCATION: NECK;SHOULDER

## 2024-12-19 ASSESSMENT — PAIN DESCRIPTION - DESCRIPTORS: DESCRIPTORS: DISCOMFORT

## 2024-12-19 ASSESSMENT — PAIN DESCRIPTION - FREQUENCY: FREQUENCY: CONTINUOUS

## 2024-12-19 ASSESSMENT — PAIN SCALES - GENERAL: PAINLEVEL_OUTOF10: 4

## 2024-12-19 ASSESSMENT — PAIN DESCRIPTION - ORIENTATION: ORIENTATION: RIGHT;LEFT

## 2024-12-19 ASSESSMENT — PAIN - FUNCTIONAL ASSESSMENT: PAIN_FUNCTIONAL_ASSESSMENT: 0-10

## 2024-12-19 NOTE — OP NOTE
Operative Note      Patient: Ginger Ferrari  YOB: 1955  MRN: [id number]    Date of Procedure: 2024    Pre-Op Diagnosis Codes:      * Cervical radiculopathy [M54.12]    Post-Op Diagnosis: Same       Procedure(s):  CERVICAL 6-7 EPIDURAL STEROID INJECTION LEFT PARAMEDIAN UNDER FLUOROSCOPIC GUIDANCE        Surgeon(s):  Murphy Angeles MD    Assistant:   * No surgical staff found *    Anesthesia: Local    Estimated Blood Loss (mL): Minimal    Complications: None    Specimens:   * No specimens in log *    Implants:  * No implants in log *      Drains: * No LDAs found *    Findings:  Infection Present At Time Of Surgery (PATOS) (choose all levels that have infection present):  No infection present  Other Findings: good needle placement    Detailed Description of Procedure:   2024    Patient: Ginger Ferrari  :  1955  Age:  69 y.o.  Sex:  female     PRE-PROCEDURE DIAGNOSIS: Cervical degenerative disc disease, cervical radicular pain.    POST-PROCEDURE DIAGNOSIS: Same.    PROCEDURE: Fluoroscopic guided cervical epidural steroid injection, #1 at C6-7 level.    SURGEON: Murphy Angeles MD    ANESTHESIA: Local    ESTIMATED BLOOD LOSS: None.  ______________________________________________________________________  BRIEF HISTORY:  Ginger Ferrari comes in today for the first  therapeutic cervical epidural injection under fluoroscopic guidance. The potential complications of this procedure were discussed with the her again today.  She has elected to undergo the aforementioned procedure.    Ginger’s complete History & Physical examination were reviewed in depth, a copy of which is in the chart.      DESCRIPTION OF PROCEDURE:    After confirming written and informed consent, a time-out was performed and Ginger’s name and date of birth, the procedure to be performed as well as the plan for the location of the needle insertion were confirmed.    The patient was brought into the procedure

## 2024-12-19 NOTE — INTERVAL H&P NOTE
Update History & Physical    The patient's History and Physical of December 2, 2024 was reviewed with the patient and I examined the patient. There was no change. The surgical site was confirmed by the patient and me.     Plan: The risks, benefits, expected outcome, and alternative to the recommended procedure have been discussed with the patient. Patient understands and wants to proceed with the procedure.     Electronically signed by Murphy Angeles MD on 12/19/2024

## 2024-12-19 NOTE — DISCHARGE INSTRUCTIONS
[facility] Pain Management Department  [place] [facility]-[phone]  Dr. Karthik Mclean   Post-Pain Block/Radiofrequency  Home Going Instructions    1-Go home, rest for the remainder of the day  2-Please do not lift over 20 pounds the day of the injection  3-If you received sedation No: alcohol, driving, operating lawn mowers, plows, tractors or other dangerous equipment until next morning. Do not make important decisions or sign legal documents for 24 hours. You may experience light headedness, dizziness, nausea or sleepiness after sedation. Do not stay alone. A responsible adult must be with you for 24 hours. You could be nauseated from the medications you have received. Your IV site may be sore and bruised.    4-No dietary restrictions     5-Resume all medications the same day, blood thinners to be resumed 24 hours after injection if you were instructed to stop any.    6-Keep the surgical site clean and dry, you may shower the next morning and remove the      dressing.     7- No sitz baths, tub baths or hot tubs/swimming for 24 hours.       8- If you have any pain at the injection site(s), application of an ice pack to the area should be       helpful, 20 minutes on/20 minutes off for next 48 hours.  9- Call [facility] immediately at if you develop.  Fever greater than 100.4 F  Have bleeding or drainage from the puncture site  Have progressive Leg/arm numbness and or weakness  Loss of control of bowel and or bladder (wet/soil yourself)  Severe headache with inability to lift head  10-You may return to work the next day

## 2025-01-06 ENCOUNTER — TRANSCRIBE ORDERS (OUTPATIENT)
Dept: ADMINISTRATIVE | Age: 70
End: 2025-01-06

## 2025-01-06 DIAGNOSIS — E85.9 AMYLOIDOSIS, UNSPECIFIED TYPE (HCC): Primary | ICD-10-CM

## 2025-01-08 ENCOUNTER — OFFICE VISIT (OUTPATIENT)
Dept: PAIN MANAGEMENT | Age: 70
End: 2025-01-08
Payer: MEDICARE

## 2025-01-08 VITALS
RESPIRATION RATE: 16 BRPM | HEIGHT: 65 IN | WEIGHT: 155 LBS | SYSTOLIC BLOOD PRESSURE: 103 MMHG | DIASTOLIC BLOOD PRESSURE: 70 MMHG | HEART RATE: 75 BPM | OXYGEN SATURATION: 95 % | BODY MASS INDEX: 25.83 KG/M2 | TEMPERATURE: 98.1 F

## 2025-01-08 DIAGNOSIS — M54.12 CERVICAL RADICULAR PAIN: ICD-10-CM

## 2025-01-08 DIAGNOSIS — M50.30 DDD (DEGENERATIVE DISC DISEASE), CERVICAL: Primary | ICD-10-CM

## 2025-01-08 DIAGNOSIS — M47.812 CERVICAL SPONDYLOSIS: ICD-10-CM

## 2025-01-08 PROCEDURE — 1123F ACP DISCUSS/DSCN MKR DOCD: CPT | Performed by: ANESTHESIOLOGY

## 2025-01-08 PROCEDURE — 1036F TOBACCO NON-USER: CPT | Performed by: ANESTHESIOLOGY

## 2025-01-08 PROCEDURE — 99213 OFFICE O/P EST LOW 20 MIN: CPT | Performed by: ANESTHESIOLOGY

## 2025-01-08 PROCEDURE — 1090F PRES/ABSN URINE INCON ASSESS: CPT | Performed by: ANESTHESIOLOGY

## 2025-01-08 PROCEDURE — 3017F COLORECTAL CA SCREEN DOC REV: CPT | Performed by: ANESTHESIOLOGY

## 2025-01-08 PROCEDURE — 1159F MED LIST DOCD IN RCRD: CPT | Performed by: ANESTHESIOLOGY

## 2025-01-08 PROCEDURE — 99213 OFFICE O/P EST LOW 20 MIN: CPT

## 2025-01-08 PROCEDURE — G8399 PT W/DXA RESULTS DOCUMENT: HCPCS | Performed by: ANESTHESIOLOGY

## 2025-01-08 PROCEDURE — G8427 DOCREV CUR MEDS BY ELIG CLIN: HCPCS | Performed by: ANESTHESIOLOGY

## 2025-01-08 PROCEDURE — G8417 CALC BMI ABV UP PARAM F/U: HCPCS | Performed by: ANESTHESIOLOGY

## 2025-01-08 NOTE — PROGRESS NOTES
Ginger Ferrari presents to the [facility] on 1/8/2025. Ginger is complaining of pain in left shoulder. The pain is intermittent. The pain is described as burning. Pain is rated on her best day at a 2, on her worst day at a 8, and on average at a 5 on the VAS scale. She took her last dose of Motrin and Gabapentin. Aleve yesterday but no gabapentin since surgery.     Any procedures since your last visit: Yes, with 95 % relief.    Pacemaker or defibrillator: No managed by na.    She is not on NSAIDS and is not on anticoagulation medications to include none and is managed by na.     Do you want someone present when the provider examines you? No    Medication Contract and Consent for Opioid Use Documents Filed        No documents found                    There were no vitals taken for this visit.     No LMP recorded. Patient is postmenopausal.  
extended release capsule Take 1 capsule by mouth daily   Yes ProviderArely MD   gabapentin (NEURONTIN) 300 MG capsule QD x 1 week, BID x 1 week, and then TID thereafter as tolerated. 12/11/24 1/10/25 Yes Mariam Jhaveri PA BREYNA 160-4.5 MCG/ACT AERO Inhale 2 puffs into the lungs daily 10/8/24  Yes Arely Husain MD   albuterol sulfate HFA (PROVENTIL;VENTOLIN;PROAIR) 108 (90 Base) MCG/ACT inhaler Inhale 1 puff into the lungs every 4 hours as needed 10/8/24  Yes Arely Husain MD   Cholecalciferol (VITAMIN D3) 25 MCG (1000 UT) CAPS Take 2 capsules by mouth daily   Yes ProviderArely MD       Allergies   Allergen Reactions    Bee Venom Swelling     Does not have epi pen    Pcn [Penicillins] Hives    Iodine      Swelling around the vaginal area       Social History     Socioeconomic History    Marital status:      Spouse name: Not on file    Number of children: Not on file    Years of education: Not on file    Highest education level: Not on file   Occupational History    Not on file   Tobacco Use    Smoking status: Never    Smokeless tobacco: Never   Vaping Use    Vaping status: Never Used   Substance and Sexual Activity    Alcohol use: No    Drug use: No    Sexual activity: Not on file   Other Topics Concern    Not on file   Social History Narrative    Not on file     Social Determinants of Health     Financial Resource Strain: Low Risk  (8/8/2024)    Received from [facility]    Overall Financial Resource Strain (CARDIA)     Difficulty of Paying Living Expenses: Not hard at all   Food Insecurity: Not on file   Transportation Needs: No Transportation Needs (8/8/2024)    Received from [facility]    PRAPARE - Transportation     Lack of Transportation (Medical): No     Lack of Transportation (Non-Medical): No   Physical Activity: Inactive (11/8/2022)    Exercise Vital Sign     Days of Exercise per Week: 0 days     Minutes of Exercise

## 2025-01-14 SDOH — HEALTH STABILITY: PHYSICAL HEALTH: ON AVERAGE, HOW MANY MINUTES DO YOU ENGAGE IN EXERCISE AT THIS LEVEL?: 0 MIN

## 2025-01-14 SDOH — HEALTH STABILITY: PHYSICAL HEALTH: ON AVERAGE, HOW MANY DAYS PER WEEK DO YOU ENGAGE IN MODERATE TO STRENUOUS EXERCISE (LIKE A BRISK WALK)?: 0 DAYS

## 2025-01-15 ENCOUNTER — OFFICE VISIT (OUTPATIENT)
Dept: ORTHOPEDIC SURGERY | Age: 70
End: 2025-01-15
Payer: MEDICARE

## 2025-01-15 VITALS
HEIGHT: 65 IN | WEIGHT: 150 LBS | SYSTOLIC BLOOD PRESSURE: 116 MMHG | BODY MASS INDEX: 24.99 KG/M2 | OXYGEN SATURATION: 98 % | TEMPERATURE: 97.8 F | HEART RATE: 105 BPM | DIASTOLIC BLOOD PRESSURE: 76 MMHG

## 2025-01-15 DIAGNOSIS — M25.512 LEFT SHOULDER PAIN, UNSPECIFIED CHRONICITY: Primary | ICD-10-CM

## 2025-01-15 PROCEDURE — G8428 CUR MEDS NOT DOCUMENT: HCPCS | Performed by: ORTHOPAEDIC SURGERY

## 2025-01-15 PROCEDURE — 1090F PRES/ABSN URINE INCON ASSESS: CPT | Performed by: ORTHOPAEDIC SURGERY

## 2025-01-15 PROCEDURE — G8420 CALC BMI NORM PARAMETERS: HCPCS | Performed by: ORTHOPAEDIC SURGERY

## 2025-01-15 PROCEDURE — 1123F ACP DISCUSS/DSCN MKR DOCD: CPT | Performed by: ORTHOPAEDIC SURGERY

## 2025-01-15 PROCEDURE — 3017F COLORECTAL CA SCREEN DOC REV: CPT | Performed by: ORTHOPAEDIC SURGERY

## 2025-01-15 PROCEDURE — 99204 OFFICE O/P NEW MOD 45 MIN: CPT | Performed by: ORTHOPAEDIC SURGERY

## 2025-01-15 PROCEDURE — 1036F TOBACCO NON-USER: CPT | Performed by: ORTHOPAEDIC SURGERY

## 2025-01-15 PROCEDURE — G8399 PT W/DXA RESULTS DOCUMENT: HCPCS | Performed by: ORTHOPAEDIC SURGERY

## 2025-01-15 PROCEDURE — 1125F AMNT PAIN NOTED PAIN PRSNT: CPT | Performed by: ORTHOPAEDIC SURGERY
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
## 2025-01-23 ENCOUNTER — HOSPITAL ENCOUNTER (OUTPATIENT)
Dept: CT IMAGING | Age: 70
Discharge: HOME | End: 2025-01-25
Attending: FAMILY MEDICINE
Payer: MEDICARE

## 2025-01-23 DIAGNOSIS — E85.9 AMYLOIDOSIS, UNSPECIFIED TYPE (HCC): ICD-10-CM

## 2025-01-23 PROCEDURE — 6360000004 HC RX CONTRAST MEDICATION: Performed by: RADIOLOGY

## 2025-01-23 PROCEDURE — 71260 CT THORAX DX C+: CPT

## 2025-01-23 RX ORDER — IOPAMIDOL 755 MG/ML
75 INJECTION, SOLUTION INTRAVASCULAR
Status: COMPLETED | OUTPATIENT
Start: 2025-01-23 | End: 2025-01-23

## 2025-01-23 RX ADMIN — IOPAMIDOL 75 ML: 755 INJECTION, SOLUTION INTRAVENOUS at 08:20

## 2025-03-25 ENCOUNTER — TELEPHONE (OUTPATIENT)
Dept: PAIN MANAGEMENT | Age: 70
End: 2025-03-25

## 2025-03-25 NOTE — TELEPHONE ENCOUNTER
Ginger is in [place] and is experiencing an increase in pain. She is taking tylenol but it has not been effective. She is requesting something else for pain. If you do send a prescription, please send to [facility] in [place]. Thanks.

## 2025-03-26 RX ORDER — METHYLPREDNISOLONE 4 MG/1
TABLET ORAL
Qty: 1 KIT | Refills: 0 | Status: SHIPPED | OUTPATIENT
Start: 2025-03-26 | End: 2025-04-01

## 2025-04-16 ENCOUNTER — TELEPHONE (OUTPATIENT)
Dept: PAIN MANAGEMENT | Age: 70
End: 2025-04-16

## 2025-04-16 NOTE — TELEPHONE ENCOUNTER
Ginger Ferrari.  Pt called in from [place] and asking for a Medrol Dosepack 4 mg for her wrists, hand and Left shoulder.  She has an appt 5/14 with Dr DOMINGO Guajardo [place], [place].  Please advise   Thank you

## 2025-05-14 ENCOUNTER — PREP FOR PROCEDURE (OUTPATIENT)
Age: 70
End: 2025-05-14

## 2025-05-14 ENCOUNTER — TELEPHONE (OUTPATIENT)
Age: 70
End: 2025-05-14

## 2025-05-14 ENCOUNTER — OFFICE VISIT (OUTPATIENT)
Age: 70
End: 2025-05-14
Payer: MEDICARE

## 2025-05-14 VITALS
OXYGEN SATURATION: 96 % | HEART RATE: 76 BPM | HEIGHT: 64 IN | BODY MASS INDEX: 25.61 KG/M2 | WEIGHT: 150 LBS | RESPIRATION RATE: 16 BRPM | DIASTOLIC BLOOD PRESSURE: 81 MMHG | TEMPERATURE: 97.3 F | SYSTOLIC BLOOD PRESSURE: 126 MMHG

## 2025-05-14 DIAGNOSIS — G89.29 CHRONIC LEFT SHOULDER PAIN: ICD-10-CM

## 2025-05-14 DIAGNOSIS — M47.812 CERVICAL SPONDYLOSIS: ICD-10-CM

## 2025-05-14 DIAGNOSIS — M50.30 DDD (DEGENERATIVE DISC DISEASE), CERVICAL: Primary | ICD-10-CM

## 2025-05-14 DIAGNOSIS — M54.12 CERVICAL RADICULAR PAIN: ICD-10-CM

## 2025-05-14 DIAGNOSIS — M54.12 CERVICAL RADICULAR PAIN: Primary | ICD-10-CM

## 2025-05-14 DIAGNOSIS — M25.512 CHRONIC LEFT SHOULDER PAIN: ICD-10-CM

## 2025-05-14 PROCEDURE — 3017F COLORECTAL CA SCREEN DOC REV: CPT | Performed by: ANESTHESIOLOGY

## 2025-05-14 PROCEDURE — 99213 OFFICE O/P EST LOW 20 MIN: CPT | Performed by: ANESTHESIOLOGY

## 2025-05-14 PROCEDURE — 1036F TOBACCO NON-USER: CPT | Performed by: ANESTHESIOLOGY

## 2025-05-14 PROCEDURE — 99212 OFFICE O/P EST SF 10 MIN: CPT | Performed by: ANESTHESIOLOGY

## 2025-05-14 PROCEDURE — G8417 CALC BMI ABV UP PARAM F/U: HCPCS | Performed by: ANESTHESIOLOGY

## 2025-05-14 PROCEDURE — G8427 DOCREV CUR MEDS BY ELIG CLIN: HCPCS | Performed by: ANESTHESIOLOGY

## 2025-05-14 PROCEDURE — G8399 PT W/DXA RESULTS DOCUMENT: HCPCS | Performed by: ANESTHESIOLOGY

## 2025-05-14 PROCEDURE — 1159F MED LIST DOCD IN RCRD: CPT | Performed by: ANESTHESIOLOGY

## 2025-05-14 PROCEDURE — 1090F PRES/ABSN URINE INCON ASSESS: CPT | Performed by: ANESTHESIOLOGY

## 2025-05-14 PROCEDURE — 1123F ACP DISCUSS/DSCN MKR DOCD: CPT | Performed by: ANESTHESIOLOGY

## 2025-05-14 RX ORDER — SODIUM CHLORIDE 9 MG/ML
INJECTION, SOLUTION INTRAVENOUS PRN
Status: CANCELLED | OUTPATIENT
Start: 2025-05-14

## 2025-05-14 RX ORDER — SODIUM CHLORIDE 0.9 % (FLUSH) 0.9 %
5-40 SYRINGE (ML) INJECTION PRN
Status: CANCELLED | OUTPATIENT
Start: 2025-05-14

## 2025-05-14 RX ORDER — ACETAMINOPHEN 500 MG
1000 TABLET ORAL EVERY 6 HOURS PRN
COMMUNITY

## 2025-05-14 RX ORDER — SODIUM CHLORIDE 0.9 % (FLUSH) 0.9 %
5-40 SYRINGE (ML) INJECTION EVERY 12 HOURS SCHEDULED
Status: CANCELLED | OUTPATIENT
Start: 2025-05-14

## 2025-05-14 NOTE — PROGRESS NOTES
[facility]        [address] [place] [address]  Dept: [phone]    Follow up Note      Ginger Ferrari     Date of Visit:  5/14/2025    CC:  Patient presents for follow up   Chief Complaint   Patient presents with    Follow-up     Neck and hand pain       HPI:  Neck pain and left UE pain.    Nursing notes and details of the pain history reviewed. Please see intake notes for details.     Has been evaluated by Dr. Nkechi DC - treated conservatively.     Had MRI of C spine and MRI of Left shoulder at [facility].     She had left shoulder injection and TPI by her primary team recently.    S/p Cervical NAS # 1 with > 95% relief.     Previous treatments:   Physical Therapy/Chiropractic treatment: yes.     Medications: - NSAID's : yes                        - Membrane stabilizers : no                       - Opioids : no                       - Adjuvants or Others : yes     Spine Surgeries: no     She has not been on anticoagulation medications      Employment: retired.     Imaging:   MRI cervical spine 11/22/2024: [facility].  FINDINGS:  POSTERIOR FOSSA CONTENTS: Visualized posterior fossa contents, brain stem and skull base structures are unremarkable.    VERTEBRAL BODIES: The vertebral bodies are normal in height, marrow signal, and alignment.There is straightening of the normal cervical lordosis. This is likely positional in nature related to spasm    INTERVERTEBRAL DISCS: Disc desiccation and mild loss of disc height at all cervical levels most pronounced at C5-6 and C6-7 levels with minimal degenerative enthesopathy..    SPINAL CORD: The spinal cord is normal in signal intensity, caliber, and position without syringohydromyelia. .    C2-C3: There is no focal disc protrusion, central canal, or foraminal stenosis .No significant facet joint arthropathy.    C3-C4: No focal disc protrusion, central canal, or significant right foraminal stenosis. Mild left foraminal stenosis.

## 2025-05-14 NOTE — PROGRESS NOTES
Ginger Ferrari presents to the [facility] on 5/14/2025. Ginger is complaining of pain in neck and bilateral hands. The pain is constant. The pain is described as aching, burning, and numb. Pain is rated on her best day at a 8, on her worst day at a 10, and on average at a 9 on the VAS scale. She took her last dose of Tylenol yesterday.     Any procedures since your last visit: No    Pacemaker or defibrillator: No     She is not on NSAIDS and is not on anticoagulation medications    Do you want someone present when the provider examines you? No    Medication Contract and Consent for Opioid Use Documents Filed        No documents found                    /81   Pulse 76   Temp 97.3 °F (36.3 °C) (Infrared)   Resp 16   Ht 1.626 m (5' 4\")   Wt 68 kg (150 lb)   SpO2 96%   BMI 25.75 kg/m²      No LMP recorded. Patient is postmenopausal.

## 2025-05-14 NOTE — H&P (VIEW-ONLY)
[facility]        [address] [place] [id number]  Dept: [phone]    Follow up Note      Ginger Ferrari     Date of Visit:  5/14/2025    CC:  Patient presents for follow up   Chief Complaint   Patient presents with    Follow-up     Neck and hand pain       HPI:  Neck pain and left UE pain.    Nursing notes and details of the pain history reviewed. Please see intake notes for details.     Has been evaluated by Dr. Nkechi DC - treated conservatively.     Had MRI of C spine and MRI of Left shoulder at [facility].     She had left shoulder injection and TPI by her primary team recently.    S/p Cervical NAS # 1 with > 95% relief.     Previous treatments:   Physical Therapy/Chiropractic treatment: yes.     Medications: - NSAID's : yes                        - Membrane stabilizers : no                       - Opioids : no                       - Adjuvants or Others : yes     Spine Surgeries: no     She has not been on anticoagulation medications      Employment: retired.     Imaging:   MRI cervical spine 11/22/2024: [facility].  FINDINGS:  POSTERIOR FOSSA CONTENTS: Visualized posterior fossa contents, brain stem and skull base structures are unremarkable.    VERTEBRAL BODIES: The vertebral bodies are normal in height, marrow signal, and alignment.There is straightening of the normal cervical lordosis. This is likely positional in nature related to spasm    INTERVERTEBRAL DISCS: Disc desiccation and mild loss of disc height at all cervical levels most pronounced at C5-6 and C6-7 levels with minimal degenerative enthesopathy..    SPINAL CORD: The spinal cord is normal in signal intensity, caliber, and position without syringohydromyelia. .    C2-C3: There is no focal disc protrusion, central canal, or foraminal stenosis .No significant facet joint arthropathy.    C3-C4: No focal disc protrusion, central canal, or significant right foraminal stenosis. Mild left foraminal stenosis.

## 2025-05-14 NOTE — TELEPHONE ENCOUNTER
Call to Ginger Ferrari that procedure was scheduled for 05/20/2025 and that [facility] should call her a few days before for the pre op call and after 3:00 PM the business day before with the arrival time. Instructed Ginger to hold ibuprofen for 24 hours, Celebrex, Mobic, and naprosyn for 4 days and any aspirin containing products, CoQ 10, or fish oil for 7 days. Instructed to call office back if any questions. Ginger verbalized understanding.    Electronically signed by Maritza Samson RN on 5/14/2025 at 1:46 PM

## 2025-05-20 ENCOUNTER — HOSPITAL ENCOUNTER (OUTPATIENT)
Age: 70
Setting detail: OUTPATIENT SURGERY
Discharge: HOME | End: 2025-05-20
Attending: ANESTHESIOLOGY | Admitting: ANESTHESIOLOGY
Payer: MEDICARE

## 2025-05-20 ENCOUNTER — HOSPITAL ENCOUNTER (OUTPATIENT)
Dept: OPERATING ROOM | Age: 70
Setting detail: OUTPATIENT SURGERY
Discharge: HOME | End: 2025-05-20
Attending: ANESTHESIOLOGY
Payer: MEDICARE

## 2025-05-20 VITALS
TEMPERATURE: 97.1 F | BODY MASS INDEX: 25.61 KG/M2 | SYSTOLIC BLOOD PRESSURE: 135 MMHG | WEIGHT: 150 LBS | DIASTOLIC BLOOD PRESSURE: 78 MMHG | RESPIRATION RATE: 16 BRPM | OXYGEN SATURATION: 99 % | HEIGHT: 64 IN | HEART RATE: 68 BPM

## 2025-05-20 DIAGNOSIS — M54.16 LUMBAR RADICULOPATHY: ICD-10-CM

## 2025-05-20 PROCEDURE — 7100000011 HC PHASE II RECOVERY - ADDTL 15 MIN: Performed by: ANESTHESIOLOGY

## 2025-05-20 PROCEDURE — 2500000003 HC RX 250 WO HCPCS: Performed by: ANESTHESIOLOGY

## 2025-05-20 PROCEDURE — 3600000005 HC SURGERY LEVEL 5 BASE: Performed by: ANESTHESIOLOGY

## 2025-05-20 PROCEDURE — 2709999900 HC NON-CHARGEABLE SUPPLY: Performed by: ANESTHESIOLOGY

## 2025-05-20 PROCEDURE — 6360000004 HC RX CONTRAST MEDICATION: Performed by: ANESTHESIOLOGY

## 2025-05-20 PROCEDURE — 3600000015 HC SURGERY LEVEL 5 ADDTL 15MIN: Performed by: ANESTHESIOLOGY

## 2025-05-20 PROCEDURE — 7100000010 HC PHASE II RECOVERY - FIRST 15 MIN: Performed by: ANESTHESIOLOGY

## 2025-05-20 PROCEDURE — 6360000002 HC RX W HCPCS: Performed by: ANESTHESIOLOGY

## 2025-05-20 RX ORDER — LIDOCAINE HYDROCHLORIDE 5 MG/ML
INJECTION, SOLUTION INFILTRATION; INTRAVENOUS PRN
Status: DISCONTINUED | OUTPATIENT
Start: 2025-05-20 | End: 2025-05-20 | Stop reason: ALTCHOICE

## 2025-05-20 RX ORDER — SODIUM CHLORIDE 9 MG/ML
INJECTION, SOLUTION INTRAVENOUS PRN
Status: DISCONTINUED | OUTPATIENT
Start: 2025-05-20 | End: 2025-05-20 | Stop reason: HOSPADM

## 2025-05-20 RX ORDER — SODIUM CHLORIDE 0.9 % (FLUSH) 0.9 %
5-40 SYRINGE (ML) INJECTION PRN
Status: DISCONTINUED | OUTPATIENT
Start: 2025-05-20 | End: 2025-05-20 | Stop reason: HOSPADM

## 2025-05-20 RX ORDER — SODIUM CHLORIDE 0.9 % (FLUSH) 0.9 %
5-40 SYRINGE (ML) INJECTION EVERY 12 HOURS SCHEDULED
Status: DISCONTINUED | OUTPATIENT
Start: 2025-05-20 | End: 2025-05-20 | Stop reason: HOSPADM

## 2025-05-20 RX ORDER — DEXAMETHASONE SODIUM PHOSPHATE 10 MG/ML
INJECTION, SOLUTION INTRAMUSCULAR; INTRAVENOUS PRN
Status: DISCONTINUED | OUTPATIENT
Start: 2025-05-20 | End: 2025-05-20 | Stop reason: ALTCHOICE

## 2025-05-20 RX ORDER — SODIUM CHLORIDE 0.9 % (FLUSH) 0.9 %
SYRINGE (ML) INJECTION PRN
Status: DISCONTINUED | OUTPATIENT
Start: 2025-05-20 | End: 2025-05-20 | Stop reason: ALTCHOICE

## 2025-05-20 ASSESSMENT — PAIN DESCRIPTION - DESCRIPTORS: DESCRIPTORS: ACHING

## 2025-05-20 ASSESSMENT — PAIN - FUNCTIONAL ASSESSMENT
PAIN_FUNCTIONAL_ASSESSMENT: NONE - DENIES PAIN
PAIN_FUNCTIONAL_ASSESSMENT: 0-10
PAIN_FUNCTIONAL_ASSESSMENT: 0-10

## 2025-05-20 NOTE — OP NOTE
in the prone position with the head flexed midline on the fluoroscopy table. A pillow was placed under the patient's head to increase cervical interlaminar space. Standard monitors were placed, and vital signs were observed throughout the procedure. The area was prepped with chloraprep and the C6-7 interspace was marked under fluoroscopy. The skin and subcutaneous tissues at the above level were anesthestized with 0.5% lidocaine. An # 18 gauge 3 1/2 tuohy epidural needle was inserted and advanced toward the inferior lamina until bony contact was made. The needle was then advanced superiorly toward epidural space . From this point on loss of resistance technique with 5 cc glass syringe was used to confirm entrance of the needle into the epidural space under intermittent lateral fluoroscopy. Once in the epidural space , negative aspiration for blood and CSF was confirmed . Needle tip placement was confirmed by visualizing epidural spread of 0.5-1 ml of omnipaque 300 visualized in both AP and lateral live fluoroscopic views. Then after negative aspiration, a solution of 0.9 % Saline 2 ml and 15 mg Dexamethasone  was easily injected. The needle was gently removed intact. The patient neck was cleaned and a Band-Aid was placed over the needle insertion point.    Disposition the patient tolerated the procedure well and there were no complications . Vital signs remained stable throughout the procedure. The patient was escorted to the recovery area where they remained until discharge and written discharge instructions for the procedure were given.    Plan: Ginger will return to our pain management center as scheduled.     Murphy Angeles MD

## 2025-05-20 NOTE — INTERVAL H&P NOTE
Update History & Physical    The patient's History and Physical of May 14, 2025 was reviewed with the patient and I examined the patient. There was no change. The surgical site was confirmed by the patient and me.     Plan: The risks, benefits, expected outcome, and alternative to the recommended procedure have been discussed with the patient. Patient understands and wants to proceed with the procedure.     Electronically signed by Murphy Angeles MD on 5/20/2025

## 2025-05-22 ENCOUNTER — HOSPITAL ENCOUNTER (OUTPATIENT)
Dept: CT IMAGING | Age: 70
Discharge: HOME | End: 2025-05-24
Payer: MEDICARE

## 2025-05-22 DIAGNOSIS — R91.1 LUNG NODULE: ICD-10-CM

## 2025-05-22 PROCEDURE — 2500000003 HC RX 250 WO HCPCS: Performed by: RADIOLOGY

## 2025-05-22 PROCEDURE — 6360000004 HC RX CONTRAST MEDICATION: Performed by: RADIOLOGY

## 2025-05-22 PROCEDURE — 71260 CT THORAX DX C+: CPT

## 2025-05-22 RX ORDER — IOPAMIDOL 755 MG/ML
75 INJECTION, SOLUTION INTRAVASCULAR
Status: COMPLETED | OUTPATIENT
Start: 2025-05-22 | End: 2025-05-22

## 2025-05-22 RX ORDER — SODIUM CHLORIDE 0.9 % (FLUSH) 0.9 %
10 SYRINGE (ML) INJECTION PRN
Status: DISCONTINUED | OUTPATIENT
Start: 2025-05-22 | End: 2025-05-25 | Stop reason: HOSPADM

## 2025-05-22 RX ADMIN — SODIUM CHLORIDE, PRESERVATIVE FREE 10 ML: 5 INJECTION INTRAVENOUS at 09:55

## 2025-05-22 RX ADMIN — IOPAMIDOL 75 ML: 755 INJECTION, SOLUTION INTRAVENOUS at 09:55

## 2025-05-28 ENCOUNTER — HOSPITAL ENCOUNTER (OUTPATIENT)
Age: 70
Discharge: HOME | End: 2025-05-30

## 2025-06-02 ENCOUNTER — TELEPHONE (OUTPATIENT)
Dept: RHEUMATOLOGY | Age: 70
End: 2025-06-02

## 2025-06-02 RX ORDER — PREDNISONE 5 MG/1
TABLET ORAL
Qty: 21 TABLET | Refills: 0 | Status: SHIPPED | OUTPATIENT
Start: 2025-06-02 | End: 2025-06-02 | Stop reason: SDUPTHER

## 2025-06-02 RX ORDER — PREDNISONE 5 MG/1
TABLET ORAL
Qty: 21 TABLET | Refills: 0 | Status: SHIPPED | OUTPATIENT
Start: 2025-06-02

## 2025-06-02 NOTE — TELEPHONE ENCOUNTER
Patient is notified - can you resend script to the local [facility], this was sent to [place].    Thanks.

## 2025-06-02 NOTE — TELEPHONE ENCOUNTER
Patient called the office asking for a steroid pack.    Patient states that she has been having a bad flare up causing severe joint pain in the hands and wrists.    Patient is asking is she can be prescribed something to take prior to her upcoming appointment in two weeks.    Please advise.      Electronically signed by Denia Rosenthal CMA on 6/2/2025 at 10:12 AM     Chonodrocutaneous Helical Advancement Flap Text: The defect edges were debeveled with a #15 scalpel blade.  Given the location of the defect and the proximity to free margins a chondrocutaneous helical advancement flap was deemed most appropriate.  Using a sterile surgical marker, the appropriate advancement flap was drawn incorporating the defect and placing the expected incisions within the relaxed skin tension lines where possible.    The area thus outlined was incised deep to adipose tissue with a #15 scalpel blade.  The skin margins were undermined to an appropriate distance in all directions utilizing iris scissors.

## 2025-06-09 LAB — SURGICAL PATHOLOGY REPORT: NORMAL

## 2025-06-11 ENCOUNTER — OFFICE VISIT (OUTPATIENT)
Dept: RHEUMATOLOGY | Age: 70
End: 2025-06-11
Payer: MEDICARE

## 2025-06-11 VITALS
WEIGHT: 147 LBS | DIASTOLIC BLOOD PRESSURE: 77 MMHG | BODY MASS INDEX: 25.1 KG/M2 | SYSTOLIC BLOOD PRESSURE: 119 MMHG | HEIGHT: 64 IN | HEART RATE: 80 BPM

## 2025-06-11 DIAGNOSIS — M05.79 RHEUMATOID ARTHRITIS INVOLVING MULTIPLE SITES WITH POSITIVE RHEUMATOID FACTOR (HCC): Primary | ICD-10-CM

## 2025-06-11 DIAGNOSIS — M35.01 SJOGREN'S SYNDROME WITH KERATOCONJUNCTIVITIS SICCA: ICD-10-CM

## 2025-06-11 DIAGNOSIS — Z79.899 HIGH RISK MEDICATION USE: ICD-10-CM

## 2025-06-11 DIAGNOSIS — D84.9 IMMUNOSUPPRESSION: ICD-10-CM

## 2025-06-11 DIAGNOSIS — E85.89 OTHER AMYLOIDOSIS (HCC): ICD-10-CM

## 2025-06-11 PROCEDURE — 1090F PRES/ABSN URINE INCON ASSESS: CPT | Performed by: INTERNAL MEDICINE

## 2025-06-11 PROCEDURE — 1036F TOBACCO NON-USER: CPT | Performed by: INTERNAL MEDICINE

## 2025-06-11 PROCEDURE — 99215 OFFICE O/P EST HI 40 MIN: CPT | Performed by: INTERNAL MEDICINE

## 2025-06-11 PROCEDURE — G8417 CALC BMI ABV UP PARAM F/U: HCPCS | Performed by: INTERNAL MEDICINE

## 2025-06-11 PROCEDURE — 3017F COLORECTAL CA SCREEN DOC REV: CPT | Performed by: INTERNAL MEDICINE

## 2025-06-11 PROCEDURE — 1159F MED LIST DOCD IN RCRD: CPT | Performed by: INTERNAL MEDICINE

## 2025-06-11 PROCEDURE — G8399 PT W/DXA RESULTS DOCUMENT: HCPCS | Performed by: INTERNAL MEDICINE

## 2025-06-11 PROCEDURE — G8427 DOCREV CUR MEDS BY ELIG CLIN: HCPCS | Performed by: INTERNAL MEDICINE

## 2025-06-11 PROCEDURE — 1123F ACP DISCUSS/DSCN MKR DOCD: CPT | Performed by: INTERNAL MEDICINE

## 2025-06-11 PROCEDURE — G2211 COMPLEX E/M VISIT ADD ON: HCPCS | Performed by: INTERNAL MEDICINE

## 2025-06-11 RX ORDER — FOLIC ACID 1 MG/1
1 TABLET ORAL DAILY
Qty: 30 TABLET | Refills: 5 | Status: SHIPPED | OUTPATIENT
Start: 2025-06-11

## 2025-06-11 RX ORDER — PREDNISONE 5 MG/1
15 TABLET ORAL DAILY
Qty: 90 TABLET | Refills: 1 | Status: SHIPPED | OUTPATIENT
Start: 2025-06-11

## 2025-06-11 ASSESSMENT — ENCOUNTER SYMPTOMS
COUGH: 0
ABDOMINAL PAIN: 0
DIARRHEA: 0
SHORTNESS OF BREATH: 0
TROUBLE SWALLOWING: 0
NAUSEA: 0
COLOR CHANGE: 0
VOMITING: 0

## 2025-06-11 NOTE — PROGRESS NOTES
The patient (or guardian, if applicable) and other individuals in attendance with the patient were advised that Artificial Intelligence will be utilized during this visit to record, process the conversation to generate a clinical note, and support improvement of the AI technology. The patient (or guardian, if applicable) and other individuals in attendance at the appointment consented to the use of AI, including the recording.                  Ginger Ferrari 1955 is a 70 y.o. female, here for evaluation of the following chief complaint(s):  Follow-up (Patient here for routine check up. )      Assessment & Plan   ASSESSMENT/PLAN:    Ginger Ferrari 1955 is a 70 y.o. female seen in follow-up for Sjogren's syndrome and  amyloidosis.    1.   RF positive rheumatoid arthritis-positive rheumatoid factor has been attributed to Sjogren's in the past.  However she now has diffuse pronounced synovitis throughout the hands and also in the feet more pronounced than would typically be expected for Sjogren's.  This is a severe exacerbation of new onset rheumatoid arthritis.  We will start her on methotrexate 15 mg weekly plus folic acid 1 mg daily.  We discussed the risks, benefits, side effects.  Will also put her on a prednisone taper of 15 mg daily for 1 month, then 10 mg daily for 1 month, then 5 mg daily for 1 month, then stop.  Will get further workup as below as well.    2.  Sjogren's syndrome-manifest as positive SSA, SSB, low positive rheumatoid factor, sicca symptoms. Unfortunately she has not seen much benefit from cevimeline, Salagen, or xerostomia spray.   Will update labs as below.    3.   Amyloidosis-patient was incidentally found to have a lung nodule.  She had biopsy done and pathology was most consistent with amyloidosis.  Clinically, other than dry eyes and dry mouth from Sjogren's and some mild arthralgias from OA she really has no other complaints.  Amyloidosis sometimes can be associated with

## 2025-06-11 NOTE — PATIENT INSTRUCTIONS
Start methotrexate 6 tabs every 7 days    Start folic acid one tab daily    Start prednisone 3 tabs daily for one month, then 2 tabs daily for one month, then one tab daily for one month, then stop    Have labs and Xray now    Have blood work monthly

## 2025-06-16 ENCOUNTER — HOSPITAL ENCOUNTER (OUTPATIENT)
Dept: MAMMOGRAPHY | Age: 70
Discharge: HOME | End: 2025-06-18
Payer: MEDICARE

## 2025-06-16 ENCOUNTER — HOSPITAL ENCOUNTER (OUTPATIENT)
Age: 70
Discharge: HOME | End: 2025-06-18
Payer: MEDICARE

## 2025-06-16 ENCOUNTER — HOSPITAL ENCOUNTER (OUTPATIENT)
Age: 70
Discharge: HOME | End: 2025-06-16
Payer: MEDICARE

## 2025-06-16 ENCOUNTER — HOSPITAL ENCOUNTER (OUTPATIENT)
Dept: GENERAL RADIOLOGY | Age: 70
Discharge: HOME | End: 2025-06-18
Payer: MEDICARE

## 2025-06-16 VITALS — WEIGHT: 150 LBS | BODY MASS INDEX: 25.61 KG/M2 | HEIGHT: 64 IN

## 2025-06-16 DIAGNOSIS — M05.79 RHEUMATOID ARTHRITIS INVOLVING MULTIPLE SITES WITH POSITIVE RHEUMATOID FACTOR (HCC): ICD-10-CM

## 2025-06-16 DIAGNOSIS — Z12.31 ENCOUNTER FOR SCREENING MAMMOGRAM FOR MALIGNANT NEOPLASM OF BREAST: ICD-10-CM

## 2025-06-16 DIAGNOSIS — D84.9 IMMUNOSUPPRESSION: ICD-10-CM

## 2025-06-16 DIAGNOSIS — Z79.899 HIGH RISK MEDICATION USE: ICD-10-CM

## 2025-06-16 DIAGNOSIS — M35.01 SJOGREN'S SYNDROME WITH KERATOCONJUNCTIVITIS SICCA: ICD-10-CM

## 2025-06-16 LAB
ALBUMIN SERPL-MCNC: 4.1 G/DL (ref 3.5–5.2)
ALP SERPL-CCNC: 92 U/L (ref 35–104)
ALT SERPL-CCNC: 12 U/L (ref 0–32)
ANION GAP SERPL CALCULATED.3IONS-SCNC: 11 MMOL/L (ref 7–16)
AST SERPL-CCNC: 17 U/L (ref 0–31)
BASOPHILS # BLD: 0.03 K/UL (ref 0–0.2)
BASOPHILS NFR BLD: 0 % (ref 0–2)
BILIRUB SERPL-MCNC: 0.4 MG/DL (ref 0–1.2)
BILIRUB UR QL STRIP: NEGATIVE
BUN SERPL-MCNC: 14 MG/DL (ref 6–23)
CALCIUM SERPL-MCNC: 9.5 MG/DL (ref 8.6–10.2)
CHLORIDE SERPL-SCNC: 99 MMOL/L (ref 98–107)
CLARITY UR: CLEAR
CO2 SERPL-SCNC: 25 MMOL/L (ref 22–29)
COLOR UR: YELLOW
COMMENT: NORMAL
CREAT SERPL-MCNC: 0.7 MG/DL (ref 0.5–1)
CRP SERPL HS-MCNC: 4.1 MG/L (ref 0–5)
EOSINOPHIL # BLD: 0.01 K/UL (ref 0.05–0.5)
EOSINOPHILS RELATIVE PERCENT: 0 % (ref 0–6)
ERYTHROCYTE [DISTWIDTH] IN BLOOD BY AUTOMATED COUNT: 14.3 % (ref 11.5–15)
ERYTHROCYTE [SEDIMENTATION RATE] IN BLOOD BY WESTERGREN METHOD: 28 MM/HR (ref 0–20)
GFR, ESTIMATED: >90 ML/MIN/1.73M2
GLUCOSE SERPL-MCNC: 105 MG/DL (ref 74–99)
GLUCOSE UR STRIP-MCNC: NEGATIVE MG/DL
HBV CORE IGM SERPL QL IA: NONREACTIVE
HBV SURFACE AB SERPL IA-ACNC: <3.5 MIU/ML (ref 0–9.99)
HBV SURFACE AG SERPL QL IA: NONREACTIVE
HCT VFR BLD AUTO: 40.2 % (ref 34–48)
HCV AB SERPL QL IA: NONREACTIVE
HGB BLD-MCNC: 13 G/DL (ref 11.5–15.5)
HGB UR QL STRIP.AUTO: NEGATIVE
IMM GRANULOCYTES # BLD AUTO: 0.07 K/UL (ref 0–0.58)
IMM GRANULOCYTES NFR BLD: 1 % (ref 0–5)
KETONES UR STRIP-MCNC: NEGATIVE MG/DL
LEUKOCYTE ESTERASE UR QL STRIP: NEGATIVE
LYMPHOCYTES NFR BLD: 1.02 K/UL (ref 1.5–4)
LYMPHOCYTES RELATIVE PERCENT: 11 % (ref 20–42)
MCH RBC QN AUTO: 29.6 PG (ref 26–35)
MCHC RBC AUTO-ENTMCNC: 32.3 G/DL (ref 32–34.5)
MCV RBC AUTO: 91.6 FL (ref 80–99.9)
MONOCYTES NFR BLD: 0.29 K/UL (ref 0.1–0.95)
MONOCYTES NFR BLD: 3 % (ref 2–12)
NEUTROPHILS NFR BLD: 85 % (ref 43–80)
NEUTS SEG NFR BLD: 8.31 K/UL (ref 1.8–7.3)
NITRITE UR QL STRIP: NEGATIVE
PH UR STRIP: 5.5 [PH] (ref 5–8)
PLATELET # BLD AUTO: 262 K/UL (ref 130–450)
PMV BLD AUTO: 11.9 FL (ref 7–12)
POTASSIUM SERPL-SCNC: 4.3 MMOL/L (ref 3.5–5)
PROT SERPL-MCNC: 9.2 G/DL (ref 6.4–8.3)
PROT UR STRIP-MCNC: NEGATIVE MG/DL
RBC # BLD AUTO: 4.39 M/UL (ref 3.5–5.5)
RHEUMATOID FACT SER NEPH-ACNC: 33 IU/ML (ref 0–14)
SODIUM SERPL-SCNC: 135 MMOL/L (ref 132–146)
SP GR UR STRIP: 1.01 (ref 1–1.03)
UROBILINOGEN UR STRIP-ACNC: 0.2 EU/DL (ref 0–1)
WBC OTHER # BLD: 9.7 K/UL (ref 4.5–11.5)

## 2025-06-16 PROCEDURE — 87340 HEPATITIS B SURFACE AG IA: CPT

## 2025-06-16 PROCEDURE — 86431 RHEUMATOID FACTOR QUANT: CPT

## 2025-06-16 PROCEDURE — 86803 HEPATITIS C AB TEST: CPT

## 2025-06-16 PROCEDURE — 81003 URINALYSIS AUTO W/O SCOPE: CPT

## 2025-06-16 PROCEDURE — 86200 CCP ANTIBODY: CPT

## 2025-06-16 PROCEDURE — 73130 X-RAY EXAM OF HAND: CPT

## 2025-06-16 PROCEDURE — 85025 COMPLETE CBC W/AUTO DIFF WBC: CPT

## 2025-06-16 PROCEDURE — 85652 RBC SED RATE AUTOMATED: CPT

## 2025-06-16 PROCEDURE — 84165 PROTEIN E-PHORESIS SERUM: CPT

## 2025-06-16 PROCEDURE — 84155 ASSAY OF PROTEIN SERUM: CPT

## 2025-06-16 PROCEDURE — 80053 COMPREHEN METABOLIC PANEL: CPT

## 2025-06-16 PROCEDURE — 77063 BREAST TOMOSYNTHESIS BI: CPT

## 2025-06-16 PROCEDURE — 36415 COLL VENOUS BLD VENIPUNCTURE: CPT

## 2025-06-16 PROCEDURE — 86317 IMMUNOASSAY INFECTIOUS AGENT: CPT

## 2025-06-16 PROCEDURE — 86481 TB AG RESPONSE T-CELL SUSP: CPT

## 2025-06-16 PROCEDURE — 86705 HEP B CORE ANTIBODY IGM: CPT

## 2025-06-16 PROCEDURE — 86140 C-REACTIVE PROTEIN: CPT

## 2025-06-18 LAB — CCP AB SER IA-ACNC: 1.8 U/ML (ref 0–7)

## 2025-06-19 ENCOUNTER — RESULTS FOLLOW-UP (OUTPATIENT)
Dept: RHEUMATOLOGY | Age: 70
End: 2025-06-19

## 2025-06-19 LAB
ALBUMIN SERPL-MCNC: 3.3 G/DL (ref 3.5–4.7)
ALPHA1 GLOB SERPL ELPH-MCNC: 0.3 G/DL (ref 0.2–0.4)
ALPHA2 GLOB SERPL ELPH-MCNC: 0.9 G/DL (ref 0.5–1)
B-GLOBULIN SERPL ELPH-MCNC: 1.2 G/DL (ref 0.8–1.3)
GAMMA GLOB SERPL ELPH-MCNC: 3 G/DL (ref 0.7–1.6)
PATHOLOGIST: ABNORMAL
PROT PATTERN SERPL ELPH-IMP: ABNORMAL
PROT SERPL-MCNC: 8.8 G/DL (ref 6.4–8.3)
T SPOT TB TEST: NORMAL

## 2025-06-23 ENCOUNTER — OFFICE VISIT (OUTPATIENT)
Age: 70
End: 2025-06-23
Payer: MEDICARE

## 2025-06-23 VITALS
HEART RATE: 78 BPM | HEIGHT: 64 IN | SYSTOLIC BLOOD PRESSURE: 116 MMHG | OXYGEN SATURATION: 96 % | RESPIRATION RATE: 18 BRPM | DIASTOLIC BLOOD PRESSURE: 74 MMHG | TEMPERATURE: 97.7 F | BODY MASS INDEX: 25.61 KG/M2 | WEIGHT: 150 LBS

## 2025-06-23 DIAGNOSIS — M54.12 CERVICAL RADICULAR PAIN: ICD-10-CM

## 2025-06-23 DIAGNOSIS — M47.812 CERVICAL SPONDYLOSIS: ICD-10-CM

## 2025-06-23 DIAGNOSIS — M50.30 DDD (DEGENERATIVE DISC DISEASE), CERVICAL: Primary | ICD-10-CM

## 2025-06-23 PROCEDURE — G8417 CALC BMI ABV UP PARAM F/U: HCPCS | Performed by: ANESTHESIOLOGY

## 2025-06-23 PROCEDURE — 1123F ACP DISCUSS/DSCN MKR DOCD: CPT | Performed by: ANESTHESIOLOGY

## 2025-06-23 PROCEDURE — G8399 PT W/DXA RESULTS DOCUMENT: HCPCS | Performed by: ANESTHESIOLOGY

## 2025-06-23 PROCEDURE — 1159F MED LIST DOCD IN RCRD: CPT | Performed by: ANESTHESIOLOGY

## 2025-06-23 PROCEDURE — 1036F TOBACCO NON-USER: CPT | Performed by: ANESTHESIOLOGY

## 2025-06-23 PROCEDURE — G8427 DOCREV CUR MEDS BY ELIG CLIN: HCPCS | Performed by: ANESTHESIOLOGY

## 2025-06-23 PROCEDURE — 99212 OFFICE O/P EST SF 10 MIN: CPT | Performed by: ANESTHESIOLOGY

## 2025-06-23 PROCEDURE — 3017F COLORECTAL CA SCREEN DOC REV: CPT | Performed by: ANESTHESIOLOGY

## 2025-06-23 PROCEDURE — 1090F PRES/ABSN URINE INCON ASSESS: CPT | Performed by: ANESTHESIOLOGY

## 2025-06-23 PROCEDURE — 99213 OFFICE O/P EST LOW 20 MIN: CPT | Performed by: ANESTHESIOLOGY

## 2025-06-23 NOTE — PROGRESS NOTES
[facility]        [address] [place] [id number]  Dept: [phone]    Follow up Note      Ginger Ferrari     Date of Visit:  6/23/2025    CC:  Patient presents for follow up   Chief Complaint   Patient presents with    Follow-up     CERVICAL 6-7 EPIDURAL STEROID INJECTION LEFT PARAMEDIAN UNDER FLUOROSCOPIC GUIDANCE - DOS 5/20/2025       HPI:  Neck pain and left UE pain.    Nursing notes and details of the pain history reviewed. Please see intake notes for details.     Has been evaluated by Dr. Nkechi DC - treated conservatively.     Had MRI of C spine and MRI of Left shoulder at [facility].     She had left shoulder injection and TPI by her primary team recently.    S/p Cervical NAS # 1 with > 95% relief.     Previous treatments:   Physical Therapy/Chiropractic treatment: yes.     Medications: - NSAID's : yes                        - Membrane stabilizers : no                       - Opioids : no                       - Adjuvants or Others : yes     Spine Surgeries: no     She has not been on anticoagulation medications      Employment: retired.     Imaging:   MRI cervical spine 11/22/2024: [facility].  FINDINGS:  POSTERIOR FOSSA CONTENTS: Visualized posterior fossa contents, brain stem and skull base structures are unremarkable.    VERTEBRAL BODIES: The vertebral bodies are normal in height, marrow signal, and alignment.There is straightening of the normal cervical lordosis. This is likely positional in nature related to spasm    INTERVERTEBRAL DISCS: Disc desiccation and mild loss of disc height at all cervical levels most pronounced at C5-6 and C6-7 levels with minimal degenerative enthesopathy..    SPINAL CORD: The spinal cord is normal in signal intensity, caliber, and position without syringohydromyelia. .    C2-C3: There is no focal disc protrusion, central canal, or foraminal stenosis .No significant facet joint arthropathy.    C3-C4: No focal disc protrusion, central

## 2025-06-23 NOTE — PROGRESS NOTES
Ginger Ferrari presents to the [facility] on 6/23/2025. Ginger is complaining of pain to the neck . The pain is constant. The pain is described as aching, burning, and numb. Pain is rated on her best day at a 1, on her worst day at a 1, and on average at a 1 on the VAS scale. She took her last dose of Tylenol about a month ago.     Any procedures since your last visit: Yes, with  99% relief.    Pacemaker or defibrillator: No     She is not on NSAIDS and is not on anticoagulation medications to include none       Medication Contract and Consent for Opioid Use Documents Filed        No documents found                    /74   Pulse 78   Temp 97.7 °F (36.5 °C)   Resp 18   Ht 1.626 m (5' 4.02\")   Wt 68 kg (150 lb)   SpO2 96%   BMI 25.73 kg/m²      No LMP recorded. Patient is postmenopausal.

## (undated) DEVICE — MARKER,SKIN,WI/RULER AND LABELS: Brand: MEDLINE

## (undated) DEVICE — DRAPE, COLUMN, DAVINCI XI

## (undated) DEVICE — SUTURE, VICRYL, 2-0, 36 IN, CT-1, UNDYED

## (undated) DEVICE — BANDAGE ADH W1XL3IN NAT FAB WVN FLX DURABLE N ADH PD SEAL

## (undated) DEVICE — DRIP REDUCTION MANIFOLD

## (undated) DEVICE — COVER, CART, 45 X 27 X 48 IN, CLEAR

## (undated) DEVICE — KIT PLT RATIO DISPNS KT 2IN CANN TIP SPRY TIP DISP MAGELLAN

## (undated) DEVICE — Device: Brand: PORTEX

## (undated) DEVICE — OBTURATOR, BLADELESS , SU

## (undated) DEVICE — 450 ML BOTTLE OF 0.05% CHLORHEXIDINE GLUCONATE IN 99.95% STERILE WATER FOR IRRIGATION, USP AND APPLICATOR.: Brand: IRRISEPT ANTIMICROBIAL WOUND LAVAGE

## (undated) DEVICE — TOWEL, SURGICAL, NEURO, O/R, 16 X 26, BLUE, STERILE

## (undated) DEVICE — CANNULA REDUCER, DAVINCI XI

## (undated) DEVICE — NEEDLE, QUINCKE, 22GX5": Brand: MEDLINE

## (undated) DEVICE — LOOP, VESSEL, MAXI, RED

## (undated) DEVICE — HANDPIECE SET WITH BONE CLEANING TIP AND SUCTION TUBE: Brand: INTERPULSE

## (undated) DEVICE — Z INACTIVE USE 2660664 SOLUTION IRRIG 3000ML 0.9% SOD CHL USP UROMATIC PLAS CONT

## (undated) DEVICE — MANIFOLD, 4 PORT NEPTUNE STANDARD

## (undated) DEVICE — NEEDLE HYPO 25GA L1.5IN BLU POLYPR HUB S STL REG BVL STR

## (undated) DEVICE — PATIENT RETURN ELECTRODE, SINGLE-USE, CONTACT QUALITY MONITORING, ADULT, WITH 9FT CORD, FOR PATIENTS WEIGING OVER 33LBS. (15KG): Brand: MEGADYNE

## (undated) DEVICE — REAMER ACET

## (undated) DEVICE — DRAPE,TOP,102X53,STERILE: Brand: MEDLINE

## (undated) DEVICE — 3M™ RED DOT™ MONITORING ELECTRODE WITH FOAM TAPE AND STICKY GEL 2560, 50/BAG, 20/CASE, 72/PLT: Brand: RED DOT™

## (undated) DEVICE — SET ORTHO STD STORTSTD1

## (undated) DEVICE — GOWN,BREATHABLE SLV,AURORA,XLG,STRL: Brand: MEDLINE

## (undated) DEVICE — GLOVE SURG SZ 85 L12IN FNGR ORTHO 126MIL CRM LTX FREE

## (undated) DEVICE — GAUZE,SPONGE,4"X4",12PLY,STERILE,LF,2'S: Brand: MEDLINE

## (undated) DEVICE — 12 ML SYRINGE,LUER-LOCK TIP: Brand: MONOJECT

## (undated) DEVICE — GRASPER, LONG, BIPOLAR, DAVINCI XI

## (undated) DEVICE — 4-PORT MANIFOLD: Brand: NEPTUNE 2

## (undated) DEVICE — SET ORTHO ACCOLADE TOTAL HIP BROACH

## (undated) DEVICE — STAPLER, SUREFORM 45, DAVINCI XI

## (undated) DEVICE — GOWN, ASTOUND, XL

## (undated) DEVICE — GLOVE SURG SZ 8 L12IN FNGR THK79MIL GRN LTX FREE

## (undated) DEVICE — GLOVE ORANGE PI 8   MSG9080

## (undated) DEVICE — DRAPE,REIN 53X77,STERILE: Brand: MEDLINE

## (undated) DEVICE — 6 ML SYRINGE LUER-LOCK TIP: Brand: MONOJECT

## (undated) DEVICE — DRAPE, SHEET, ENDOSCOPY, GENERAL, FENESTRATED, ARMBOARD COVER, 98 X 123.5 IN, DISPOSABLE, LF, STERILE

## (undated) DEVICE — Z INACTIVE NO ACTIVE SUPPLIER APPLICATOR MEDICATED 26 CC TINT HI-LITE ORNG STRL CHLORAPREP

## (undated) DEVICE — FORCEPS, CADIERE, DAVINCI XI

## (undated) DEVICE — 3 ML SYRINGE LUER-LOCK TIP: Brand: MONOJECT

## (undated) DEVICE — 3M™ COBAN™ NL STERILE NON-LATEX SELF-ADHERENT WRAP, 2084S, 4 IN X 5 YD (10 CM X 4,5 M), 18 ROLLS/CASE: Brand: 3M™ COBAN™

## (undated) DEVICE — SOLUTION IRRIG 3000ML 0.9% SOD CHL USP UROMATIC PLAS CONT

## (undated) DEVICE — Device

## (undated) DEVICE — CANNULA SEAL, STAPLER, DAVINCI XI

## (undated) DEVICE — TOWEL,OR,DSP,ST,BLUE,STD,6/PK,12PK/CS: Brand: MEDLINE

## (undated) DEVICE — BIT DRL L5IN DIA2.8MM STD ST S STL TWST BUSA

## (undated) DEVICE — CLOTH SURG PREP PREOPERATIVE CHLORHEXIDINE GLUC 2% READYPREP

## (undated) DEVICE — GLOVE ORANGE PI 7 1/2   MSG9075

## (undated) DEVICE — TOTAL HIP PK

## (undated) DEVICE — 3M™ STERI-DRAPE™ U-DRAPE 1015: Brand: STERI-DRAPE™

## (undated) DEVICE — NON-DEHP CATHETER EXTENSION SET, MALE LUER LOCK ADAPTER

## (undated) DEVICE — ELECTRODE PT RET AD L9FT HI MOIST COND ADH HYDRGEL CORDED

## (undated) DEVICE — GRASPER, FENESTRATED TIP-UP XI

## (undated) DEVICE — GOWN,SIRUS,FABRNF,XL,20/CS: Brand: MEDLINE

## (undated) DEVICE — DRAPE, INCISE, ANTIMICROBIAL, IOBAN 2, LARGE, 17 X 23 IN, DISPOSABLE, STERILE

## (undated) DEVICE — SOLUTION IRRIG 1000ML STRL H2O USP PLAS POUR BTL

## (undated) DEVICE — SET ORTHO ACCOLADE TOTAL HIP INSRTION

## (undated) DEVICE — HANDPIECE SET WITH COAXIAL HIGH FLOW TIP AND SUCTION TUBE: Brand: INTERPULSE

## (undated) DEVICE — TUBING, SUCTION, 9/32" X 10', STRAIGHT: Brand: MEDLINE

## (undated) DEVICE — BAG, TISSUE RETRIEVAL, 10MM, ANCHOR

## (undated) DEVICE — 2108 SERIES SAGITTAL BLADE FAN, OFFSET  (34.5 X 0.8 X 64.0MM)

## (undated) DEVICE — SURGICAL PROCEDURE PACK BASIC

## (undated) DEVICE — BASIC SINGLE BASIN 1-LF: Brand: MEDLINE INDUSTRIES, INC.

## (undated) DEVICE — SYRINGE MED 30ML STD CLR PLAS LUERLOCK TIP N CTRL DISP

## (undated) DEVICE — DRAPE, SHEET, FAN FOLDED, HALF, 44 X 58 IN, DISPOSABLE, LF, STERILE

## (undated) DEVICE — PEEL-AWAY HOOD: Brand: FLYTE, SURGICOOL

## (undated) DEVICE — EXTRAS HIP

## (undated) DEVICE — GAUZE,SPONGE,4"X4",8PLY,STRL,LF,10/TRAY: Brand: MEDLINE

## (undated) DEVICE — SYRINGE MED 5ML STD CLR PLAS LUERLOCK TIP N CTRL DISP

## (undated) DEVICE — RELOAD, SUREFORM 45, 4.6 BLACK

## (undated) DEVICE — STRIP,CLOSURE,WOUND,MEDI-STRIP,1/2X4: Brand: MEDLINE

## (undated) DEVICE — CATHETER TRAY, SURESTEP, 16FR, URINE METER W/STATLOCK

## (undated) DEVICE — 2108 SERIES SAGITTAL BLADE, OFFSET (20.0 X 0.89 X 80.0MM)

## (undated) DEVICE — ELECTRODE, ELECTROSURGICAL, BLADE, INSULATED, ENT/IMA, STERILE

## (undated) DEVICE — SOLUTION IRRIG 1000ML 0.9% SOD CHL USP POUR PLAS BTL

## (undated) DEVICE — RETRACTOR INIT

## (undated) DEVICE — GOWN,SIRUS,POLYRNF,BRTHSLV,XL,30/CS: Brand: MEDLINE

## (undated) DEVICE — COVER HNDL LT DISP

## (undated) DEVICE — PACK PROCEDURE SURG GEN CUST

## (undated) DEVICE — CATHETER, THORACIC, STRAIGHT, TROCAR, 5 EYES, 24 FR, 22 IN, PVC

## (undated) DEVICE — 1000 S-DRAPE TOWEL DRAPE 10/BX: Brand: STERI-DRAPE™

## (undated) DEVICE — COLLECTION UNIT, DRAINAGE, THORACIC, SINGLE TUBE, DRY SUCTION, ATS COMPATIBLE, OASIS 3600, LF

## (undated) DEVICE — APPLICATOR PREP 26ML 0.7% IOD POVACRYLEX 74% ISO ALC ST

## (undated) DEVICE — TUBING SET, TRI-LUMEN, FILTERED, F/AIRSEAL

## (undated) DEVICE — DRESSING, ADHESIVE, ISLAND, TELFA, 2 X 3.75 IN, LF

## (undated) DEVICE — LIQUIBAND RAPID ADHESIVE 36/CS 0.8ML: Brand: MEDLINE

## (undated) DEVICE — HEWSON SUTURE RETRIEVER: Brand: HEWSON SUTURE RETRIEVER

## (undated) DEVICE — DOUBLE BASIN SET: Brand: MEDLINE INDUSTRIES, INC.

## (undated) DEVICE — NEEDLE HYPO 18GA L1.5IN PNK POLYPR HUB S STL REG BVL STR

## (undated) DEVICE — KIT, ROBOTIC, CUSTOM UHC

## (undated) DEVICE — RETRACTOR KNE PCA

## (undated) DEVICE — SYRINGE MED 50ML LUERLOCK TIP

## (undated) DEVICE — SEAL, UNIVERSAL 5-8MM  XI

## (undated) DEVICE — TUBING, SUCTION, CONNECTING, STERILE 0.25 X 120 IN., LF

## (undated) DEVICE — GAUZE, KITTNER ROLL, STERILE

## (undated) DEVICE — DRESSING HYDROFIBER AQUACEL AG ADVANTAGE 3.5X12 IN

## (undated) DEVICE — SPONGE LAP W18XL18IN WHT COT 4 PLY FLD STRUNG RADPQ DISP ST 2 PER PACK

## (undated) DEVICE — SUTURE, ETHIBOND, XTRA, 30 IN, 0, CT-1, GREEN

## (undated) DEVICE — TRAY EPI SGL DOSE 18GA NDL CUST AULTMAN HOSP

## (undated) DEVICE — STRIP, SKIN CLOSURE, STERI STRIP, REINFORCED, 0.5 X 4 IN

## (undated) DEVICE — 3M™ IOBAN™ 2 ANTIMICROBIAL INCISE DRAPE 6651EZ: Brand: IOBAN™ 2

## (undated) DEVICE — COUNTER NDL 10 COUNT HLD 20 FOAM BLK SGL MAG

## (undated) DEVICE — ACCESS PORT, 12MM, 100MM LENGTH, LOW PROFILE W/BLADELESS OPTICAL TIP

## (undated) DEVICE — DRAPE, ARM XI

## (undated) DEVICE — SPONGE, HEMOSTATIC, CELLULOSE, SURGICEL, 2 X 14 IN

## (undated) DEVICE — SET ORTHO STD STORTSTD2

## (undated) DEVICE — SUTURE, VICRYL, 4-0, 18 IN, PS2, UNDYED

## (undated) DEVICE — SET ORTHO TRI CERAMIC ACET INST

## (undated) DEVICE — APPLICATOR MEDICATED 10.5 CC SOLUTION HI LT ORNG CHLORAPREP

## (undated) DEVICE — GLOVE ORANGE PI 8 1/2   MSG9085

## (undated) DEVICE — DRESSING, ISLAND, TELFA, 4 X 5 IN